# Patient Record
Sex: FEMALE | Race: WHITE | NOT HISPANIC OR LATINO | Employment: UNEMPLOYED | URBAN - METROPOLITAN AREA
[De-identification: names, ages, dates, MRNs, and addresses within clinical notes are randomized per-mention and may not be internally consistent; named-entity substitution may affect disease eponyms.]

---

## 2023-04-14 ENCOUNTER — HOSPITAL ENCOUNTER (OUTPATIENT)
Dept: DATA CONVERSION | Facility: HOSPITAL | Age: 83
End: 2023-04-14
Attending: PHYSICAL MEDICINE & REHABILITATION | Admitting: PHYSICAL MEDICINE & REHABILITATION
Payer: MEDICARE

## 2023-04-14 DIAGNOSIS — M47.816 SPONDYLOSIS WITHOUT MYELOPATHY OR RADICULOPATHY, LUMBAR REGION: ICD-10-CM

## 2023-04-14 DIAGNOSIS — M96.1 POSTLAMINECTOMY SYNDROME, NOT ELSEWHERE CLASSIFIED: ICD-10-CM

## 2023-04-28 ENCOUNTER — HOSPITAL ENCOUNTER (OUTPATIENT)
Dept: DATA CONVERSION | Facility: HOSPITAL | Age: 83
End: 2023-04-28
Attending: PHYSICAL MEDICINE & REHABILITATION | Admitting: PHYSICAL MEDICINE & REHABILITATION
Payer: MEDICARE

## 2023-04-28 DIAGNOSIS — M96.1 POSTLAMINECTOMY SYNDROME, NOT ELSEWHERE CLASSIFIED: ICD-10-CM

## 2023-04-28 DIAGNOSIS — M47.816 SPONDYLOSIS WITHOUT MYELOPATHY OR RADICULOPATHY, LUMBAR REGION: ICD-10-CM

## 2023-08-28 PROBLEM — R32 URINARY INCONTINENCE: Status: ACTIVE | Noted: 2023-08-28

## 2023-08-28 PROBLEM — M47.816 ARTHRITIS OF LUMBAR SPINE: Status: ACTIVE | Noted: 2023-08-28

## 2023-08-28 PROBLEM — M54.16 CHRONIC LUMBAR RADICULOPATHY: Status: ACTIVE | Noted: 2023-08-28

## 2023-08-28 PROBLEM — N28.1 RENAL CYST, LEFT: Status: ACTIVE | Noted: 2023-08-28

## 2023-08-28 PROBLEM — T81.9XXA COMPLICATION OF SURGICAL PROCEDURE: Status: ACTIVE | Noted: 2023-08-28

## 2023-08-28 RX ORDER — AMLODIPINE BESYLATE 10 MG/1
1 TABLET ORAL DAILY
COMMUNITY
Start: 2022-06-13

## 2023-08-28 RX ORDER — DULOXETIN HYDROCHLORIDE 30 MG/1
1 CAPSULE, DELAYED RELEASE ORAL 2 TIMES DAILY
COMMUNITY
Start: 2022-06-13 | End: 2024-01-18

## 2023-08-28 RX ORDER — HYDROCODONE BITARTRATE AND ACETAMINOPHEN 5; 325 MG/1; MG/1
1 TABLET ORAL EVERY 8 HOURS PRN
COMMUNITY
End: 2023-11-30 | Stop reason: ALTCHOICE

## 2023-08-28 RX ORDER — CHOLECALCIFEROL (VITAMIN D3) 50 MCG
1 TABLET ORAL DAILY
COMMUNITY
End: 2024-01-18 | Stop reason: ALTCHOICE

## 2023-08-28 RX ORDER — GABAPENTIN 300 MG/1
1 CAPSULE ORAL NIGHTLY
COMMUNITY
Start: 2023-02-13 | End: 2024-01-18 | Stop reason: ALTCHOICE

## 2023-08-28 RX ORDER — ACETAMINOPHEN 500 MG
TABLET ORAL
COMMUNITY

## 2023-08-28 RX ORDER — BIOTIN 1 MG
1 TABLET ORAL DAILY
COMMUNITY

## 2023-08-28 RX ORDER — NEBIVOLOL HYDROCHLORIDE 10 MG/1
1 TABLET ORAL DAILY
COMMUNITY

## 2023-08-28 RX ORDER — OMEPRAZOLE 40 MG/1
40 CAPSULE, DELAYED RELEASE ORAL
COMMUNITY

## 2023-08-28 RX ORDER — BUPRENORPHINE 5 UG/H
1 PATCH TRANSDERMAL
COMMUNITY
Start: 2023-02-22 | End: 2024-01-18 | Stop reason: ALTCHOICE

## 2023-08-28 RX ORDER — NALOXONE HYDROCHLORIDE 4 MG/.1ML
SPRAY NASAL
COMMUNITY
Start: 2023-01-20

## 2023-08-28 RX ORDER — NORTRIPTYLINE HYDROCHLORIDE 10 MG/1
1 CAPSULE ORAL DAILY
COMMUNITY

## 2023-08-28 RX ORDER — HYDROCODONE BITARTRATE AND ACETAMINOPHEN 10; 325 MG/1; MG/1
1 TABLET ORAL
COMMUNITY
Start: 2022-12-29 | End: 2023-11-30 | Stop reason: ALTCHOICE

## 2023-08-28 RX ORDER — LOPERAMIDE HCL 2 MG
1 TABLET ORAL EVERY 4 HOURS PRN
COMMUNITY

## 2023-08-28 RX ORDER — SPIRONOLACTONE 25 MG/1
1 TABLET ORAL DAILY
COMMUNITY

## 2023-08-28 RX ORDER — PREDNISONE 10 MG/1
10 TABLET ORAL
COMMUNITY
Start: 2022-08-16 | End: 2024-01-18 | Stop reason: ALTCHOICE

## 2023-08-28 RX ORDER — TRIAMCINOLONE ACETONIDE 1 MG/G
OINTMENT TOPICAL 3 TIMES DAILY
COMMUNITY
Start: 2021-02-23

## 2023-08-28 RX ORDER — ROSUVASTATIN CALCIUM 20 MG/1
40 TABLET, COATED ORAL DAILY
COMMUNITY
Start: 2022-06-13

## 2023-09-07 VITALS — HEIGHT: 62 IN | WEIGHT: 165.34 LBS | BODY MASS INDEX: 30.43 KG/M2

## 2023-09-14 LAB
MISCELLANEUOUS TEST RESULT: NORMAL
NAME OF SENDOUT TEST: NORMAL

## 2023-09-14 NOTE — H&P
History & Physical Reviewed:   I have reviewed the History and Physical dated:  23-Mar-2023   History and Physical reviewed and relevant findings noted. Patient examined to review pertinent physical  findings.: No significant changes   Home Medications Reviewed: no changes noted   Allergies Reviewed: no changes noted       ERAS (Enhanced Recovery After Surgery):  ·  ERAS Patient: no     Consent:   COVID-19 Consent:  ·  COVID-19 Risk Consent Surgeon has reviewed key risks related to the risk of mark anthony COVID-19 and if they contract COVID-19 what the risks are.       Electronic Signatures:  Esteban Rodriguez)  (Signed 14-Apr-2023 10:45)   Authored: History & Physical Reviewed, ERAS, Consent,  Note Completion      Last Updated: 14-Apr-2023 10:45 by Esteban Rodriguez)

## 2023-09-14 NOTE — H&P
History & Physical Reviewed:   I have reviewed the History and Physical dated:  18-Apr-2023   History and Physical reviewed and relevant findings noted. Patient examined to review pertinent physical  findings.: No significant changes   Home Medications Reviewed: no changes noted   Allergies Reviewed: no changes noted       ERAS (Enhanced Recovery After Surgery):  ·  ERAS Patient: no     Consent:   COVID-19 Consent:  ·  COVID-19 Risk Consent Surgeon has reviewed key risks related to the risk of mark anthony COVID-19 and if they contract COVID-19 what the risks are.     Attestation:   Note Completion:  I am a:  Resident/Fellow   Attending Attestation I saw and evaluated the patient.  I personally obtained the key and critical portions of the history and physical exam or was physically present for key and  critical portions performed by the resident/fellow. I reviewed the resident/fellow?s documentation and discussed the patient with the resident/fellow.  I agree with the resident/fellow?s medical decision making as documented in the note.     I personally evaluated the patient on 28-Apr-2023         Electronic Signatures:  Eva Meraz (DO (Resident))  (Signed 28-Apr-2023 11:19)   Authored: History & Physical Reviewed, ERAS, Consent,  Note Completion  Esteban Rodriguez)  (Signed 28-Apr-2023 11:36)   Authored: Note Completion   Co-Signer: History & Physical Reviewed, ERAS, Consent, Note Completion      Last Updated: 28-Apr-2023 11:36 by Esteban Rodriguez)

## 2023-10-02 NOTE — OP NOTE
Post Operative Note:     PreOp Diagnosis: Lumbar spondylosis   Post-Procedure Diagnosis: Lumbar spondylosis   Procedure: 1.  T12, L1, L2 diagnostic medial branch  block for L1-2 and L2-3 facet mediated pain bilaterally  2.   3.   4.   5.   Surgeon: Esteban Rodriguez MD   Resident/Fellow/Other Assistant: none   Estimated Blood Loss (mL): none   Specimen: no   Complications: none apparent   Findings: expected anatomy     Operative Report Dictated:  Dictation: not applicable - note contains Operative  Report   Operative Report:    Patient was identified in the preoperative area and informed consent was obtained. The patient was then brought to the operating room and placed in the prone position  With chlorhexidine and draped in the usual sterile fashion.  Using fluoroscopic guidance, 25-gauge spinal needles were then advanced to the appropriate target sites bilaterally and needle positions were confirmed in AP and lateral views.  Thereafter the  below medication was applied at each needle tip, the needles were then removed.  The patient was then transferred to the recovery room in stable condition.    Level(s): T12, L1, L2 For L1-2 and L2-3 facet arthropathy-  Laterality: [Bilateral]  Medication at each site [0.5mL] [0.5% bupivacaine]      Electronic Signatures:  Esteban Rodriguez)  (Signed 14-Apr-2023 11:12)   Authored: Post Operative Note, Note Completion      Last Updated: 14-Apr-2023 11:12 by Esteban Rodrgiuez)

## 2023-10-02 NOTE — OP NOTE
Post Operative Note:     PreOp Diagnosis: Lumbar spondylosis   Post-Procedure Diagnosis: Lumbar spondylosis at L1-2  and L2-3   Procedure: 1.  bilateral confirmatory diagnostic  medial branch blocks for L1-2 and L2-3 facet pain  2.   3.   4.   5.   Surgeon: Esteban Rodriguez MD   Resident/Fellow/Other Assistant: Konrad Barrow DO   Estimated Blood Loss (mL): none   Specimen: no   Complications: none apparent   Findings: expected anatomy     Operative Report Dictated:  Dictation: not applicable - note contains Operative  Report   Operative Report:    Patient was identified in the preoperative area and informed consent was obtained. The patient was then brought to the operating room and placed in the prone position  With chlorhexidine and draped in the usual sterile fashion.  Using fluoroscopic guidance, 25-gauge spinal needles were then advanced to the appropriate target sites bilaterally and needle positions were confirmed in AP and lateral views.  Thereafter the  below medication was applied at each needle tip, the needles were then removed.  The patient was then transferred to the recovery room in stable condition.    Level(s): T12, L1, L2 for L1-2 and L2-3 facet pain    Laterality: [Bilateral]  Medication at each site [0.5mL] [0.5% bupivacaine]    Attestation:   Note Completion:  Attending Attestation I was present for the entire procedure         Electronic Signatures:  Esteban Rodriguez)  (Signed 28-Apr-2023 12:09)   Authored: Post Operative Note, Note Completion      Last Updated: 28-Apr-2023 12:09 by Esteban Rodriguez)

## 2023-10-05 ENCOUNTER — APPOINTMENT (OUTPATIENT)
Dept: PAIN MEDICINE | Facility: HOSPITAL | Age: 83
End: 2023-10-05
Payer: MEDICARE

## 2023-11-30 ENCOUNTER — APPOINTMENT (OUTPATIENT)
Dept: PAIN MEDICINE | Facility: HOSPITAL | Age: 83
End: 2023-11-30
Payer: MEDICARE

## 2023-11-30 DIAGNOSIS — M96.1 POSTLAMINECTOMY SYNDROME OF LUMBAR REGION: ICD-10-CM

## 2023-11-30 DIAGNOSIS — M51.36 DDD (DEGENERATIVE DISC DISEASE), LUMBAR: Primary | ICD-10-CM

## 2023-11-30 NOTE — TELEPHONE ENCOUNTER
Pended 2 week RX for [place] 5-325 mg to [facility] due to pt having COVID to last until rescheduled appointment.    LV:  9/5/23  OLFD:  9/5/23  #90/30 DAYS    RX pended to [facility] for transmission to [facility]

## 2023-11-30 NOTE — TELEPHONE ENCOUNTER
Patient had a appointment scheduled for 11/30/23 but tested positive for Covid on 11/28/23.. patient will need a 2 week prescription sent to pharmacy.. patient was sleeping and will CB to schedule FU appointment

## 2023-12-01 RX ORDER — HYDROCODONE BITARTRATE AND ACETAMINOPHEN 5; 325 MG/1; MG/1
1 TABLET ORAL EVERY 8 HOURS PRN
Qty: 42 TABLET | Refills: 0 | Status: SHIPPED | OUTPATIENT
Start: 2023-12-01 | End: 2023-12-15

## 2023-12-01 NOTE — TELEPHONE ENCOUNTER
It is okay to fill a 2-week prescription for hydrocodone 5 mg up to 3 times per day for this patient who has COVID to last her until her next appointment with us.

## 2023-12-16 ENCOUNTER — LAB (OUTPATIENT)
Dept: LAB | Facility: LAB | Age: 83
End: 2023-12-16
Payer: MEDICARE

## 2023-12-16 ENCOUNTER — HOSPITAL ENCOUNTER (OUTPATIENT)
Dept: RADIOLOGY | Facility: HOSPITAL | Age: 83
Discharge: HOME | End: 2023-12-16
Payer: MEDICARE

## 2023-12-16 DIAGNOSIS — K59.09 OTHER CONSTIPATION: ICD-10-CM

## 2023-12-16 DIAGNOSIS — R10.30 LOWER ABDOMINAL PAIN, UNSPECIFIED: ICD-10-CM

## 2023-12-16 DIAGNOSIS — U09.9 POST COVID-19 CONDITION, UNSPECIFIED: Primary | ICD-10-CM

## 2023-12-16 LAB
ALBUMIN SERPL BCP-MCNC: 3.5 G/DL (ref 3.4–5)
ALP SERPL-CCNC: 75 U/L (ref 33–136)
ALT SERPL W P-5'-P-CCNC: 9 U/L (ref 7–45)
ANION GAP SERPL CALC-SCNC: 14 MMOL/L (ref 10–20)
AST SERPL W P-5'-P-CCNC: 11 U/L (ref 9–39)
BASOPHILS # BLD AUTO: 0.1 X10*3/UL (ref 0–0.1)
BASOPHILS NFR BLD AUTO: 0.9 %
BILIRUB SERPL-MCNC: 0.6 MG/DL (ref 0–1.2)
BUN SERPL-MCNC: 9 MG/DL (ref 6–23)
CALCIUM SERPL-MCNC: 9.3 MG/DL (ref 8.6–10.3)
CHLORIDE SERPL-SCNC: 96 MMOL/L (ref 98–107)
CO2 SERPL-SCNC: 28 MMOL/L (ref 21–32)
CREAT SERPL-MCNC: 0.63 MG/DL (ref 0.5–1.05)
EOSINOPHIL # BLD AUTO: 0.06 X10*3/UL (ref 0–0.4)
EOSINOPHIL NFR BLD AUTO: 0.5 %
ERYTHROCYTE [DISTWIDTH] IN BLOOD BY AUTOMATED COUNT: 12.4 % (ref 11.5–14.5)
GFR SERPL CREATININE-BSD FRML MDRD: 88 ML/MIN/1.73M*2
GLUCOSE SERPL-MCNC: 93 MG/DL (ref 74–99)
HCT VFR BLD AUTO: 37.1 % (ref 36–46)
HGB BLD-MCNC: 11.8 G/DL (ref 12–16)
IMM GRANULOCYTES # BLD AUTO: 0.04 X10*3/UL (ref 0–0.5)
IMM GRANULOCYTES NFR BLD AUTO: 0.3 % (ref 0–0.9)
LYMPHOCYTES # BLD AUTO: 1.53 X10*3/UL (ref 0.8–3)
LYMPHOCYTES NFR BLD AUTO: 13.2 %
MCH RBC QN AUTO: 28.6 PG (ref 26–34)
MCHC RBC AUTO-ENTMCNC: 31.8 G/DL (ref 32–36)
MCV RBC AUTO: 90 FL (ref 80–100)
MONOCYTES # BLD AUTO: 1.79 X10*3/UL (ref 0.05–0.8)
MONOCYTES NFR BLD AUTO: 15.5 %
NEUTROPHILS # BLD AUTO: 8.03 X10*3/UL (ref 1.6–5.5)
NEUTROPHILS NFR BLD AUTO: 69.6 %
NRBC BLD-RTO: 0 /100 WBCS (ref 0–0)
PLATELET # BLD AUTO: 409 X10*3/UL (ref 150–450)
POTASSIUM SERPL-SCNC: 4.5 MMOL/L (ref 3.5–5.3)
PROT SERPL-MCNC: 5.8 G/DL (ref 6.4–8.2)
RBC # BLD AUTO: 4.12 X10*6/UL (ref 4–5.2)
SODIUM SERPL-SCNC: 133 MMOL/L (ref 136–145)
WBC # BLD AUTO: 11.6 X10*3/UL (ref 4.4–11.3)

## 2023-12-16 PROCEDURE — 74019 RADEX ABDOMEN 2 VIEWS: CPT | Performed by: RADIOLOGY

## 2023-12-16 PROCEDURE — 36415 COLL VENOUS BLD VENIPUNCTURE: CPT

## 2023-12-16 PROCEDURE — 80053 COMPREHEN METABOLIC PANEL: CPT

## 2023-12-16 PROCEDURE — 74019 RADEX ABDOMEN 2 VIEWS: CPT

## 2023-12-16 PROCEDURE — 85025 COMPLETE CBC W/AUTO DIFF WBC: CPT

## 2024-01-18 ENCOUNTER — OFFICE VISIT (OUTPATIENT)
Dept: PAIN MEDICINE | Facility: HOSPITAL | Age: 84
End: 2024-01-18
Payer: MEDICARE

## 2024-01-18 VITALS
WEIGHT: 164.4 LBS | DIASTOLIC BLOOD PRESSURE: 76 MMHG | HEART RATE: 62 BPM | HEIGHT: 62 IN | SYSTOLIC BLOOD PRESSURE: 127 MMHG | BODY MASS INDEX: 30.25 KG/M2 | RESPIRATION RATE: 16 BRPM

## 2024-01-18 DIAGNOSIS — Z79.891 ENCOUNTER FOR LONG-TERM USE OF OPIATE ANALGESIC: ICD-10-CM

## 2024-01-18 DIAGNOSIS — M96.1 LUMBAR POSTLAMINECTOMY SYNDROME: Primary | ICD-10-CM

## 2024-01-18 PROCEDURE — 1159F MED LIST DOCD IN RCRD: CPT | Performed by: PHYSICAL MEDICINE & REHABILITATION

## 2024-01-18 PROCEDURE — 99214 OFFICE O/P EST MOD 30 MIN: CPT | Performed by: PHYSICAL MEDICINE & REHABILITATION

## 2024-01-18 RX ORDER — DULOXETIN HYDROCHLORIDE 30 MG/1
30 CAPSULE, DELAYED RELEASE ORAL DAILY
Qty: 30 CAPSULE | Refills: 2 | Status: SHIPPED | OUTPATIENT
Start: 2024-01-18

## 2024-01-18 ASSESSMENT — PATIENT HEALTH QUESTIONNAIRE - PHQ9
1. LITTLE INTEREST OR PLEASURE IN DOING THINGS: NOT AT ALL
SUM OF ALL RESPONSES TO PHQ9 QUESTIONS 1 AND 2: 0
2. FEELING DOWN, DEPRESSED OR HOPELESS: NOT AT ALL

## 2024-01-18 ASSESSMENT — COLUMBIA-SUICIDE SEVERITY RATING SCALE - C-SSRS
6. HAVE YOU EVER DONE ANYTHING, STARTED TO DO ANYTHING, OR PREPARED TO DO ANYTHING TO END YOUR LIFE?: NO
2. HAVE YOU ACTUALLY HAD ANY THOUGHTS OF KILLING YOURSELF?: NO
1. IN THE PAST MONTH, HAVE YOU WISHED YOU WERE DEAD OR WISHED YOU COULD GO TO SLEEP AND NOT WAKE UP?: NO

## 2024-01-18 ASSESSMENT — ENCOUNTER SYMPTOMS
DEPRESSION: 0
LOSS OF SENSATION IN FEET: 0
OCCASIONAL FEELINGS OF UNSTEADINESS: 1

## 2024-01-18 NOTE — PROGRESS NOTES
"Subjective   Patient ID: Janelle Jung is a 83 y.o. female who presents for Back Pain.  HPI    Location of Pain:pt is here for interval follow up and medication count. Patient states she is having low back pain and that moves up the back to the middle. Neck pain. Facial neuralgia has improved. Bilateral leg weakness/numbness. States she is having issues with bladder/bowel incontinence.     Pain Score: \"3\" /10 0-10 score     Treatment: Norco 5/325mg 2-3x's  Count:  pills left in rx bottle  Fill Date: 12/1/23  Last 3 Doses Taken: 1/18/24 1 dose and usually takes 1-2 per day  Efficacy: 30% relief   Side effects: constipation- Miralax     Narcan: exp- pt states she has at home- educated to bring to every visit     Other medications: Duloxetine     Injection/Procedures: SCS  [address]- helps with radicular pain but not lower back pain/ Bilateral Lumbar MBB - 0% relief with 2nd injection       Urine Screen: swab 9/5/23  Office agreement: 6/20/23  Narcotics Agreement: 9/5/23  ORT: 9/5/23 score 0  PDUQ: 9/5/23 score 8       Patient is presenting today history of postlaminectomy pain syndrome status post remote L3-S1 fusion with L3-L5 laminectomy, she also has a spinal cord stimulator and is maintained on low dose Narco taking no more than 1 to 2/day.  This is helping when she takes it as she does take it with Tylenol.  Also planning on meeting with the [facility] for reprogramming of her spinal cord stimulator.     The patient currently denies worsening numbness, tingling, or motor weakness.  She is having some more pain now in her back.  She may have been on duloxetine or Cymbalta at one point but she is not sure, review showed she may have been on it twice a day but again patient is not really sure and does not appear she is taking it.  She did not tolerate gabapentin  The patient denies fever, chills, night sweats, headache, weight loss, change in bowel/bladder function.     Interventions " tried:  -NSAID's  -Tylenol  -Gabapentin  -Lyrica  -Topamax  -TCA's  -PT:  -Aquatherapy  -Injections:                  OARRS:  Esteban Rodriguez MD on 1/26/2024 11:22 AM  I have personally reviewed the OARRS report for Janelle Jung. I have considered the risks of abuse, dependence, addiction and diversion and I believe that it is clinically appropriate for Janelle Jung to be prescribed this medication    Is the patient prescribed a combination of a benzodiazepine and opioid?  No    Last Urine Drug Screen / ordered today: No  No results found for this or any previous visit (from the past 8760 hour(s)).  Results are as expected.     Controlled Substance Agreement:  Date of the Last Agreement: 9/2/23  Reviewed Controlled Substance Agreement including but not limited to the benefits, risks, and alternatives to treatment with a Controlled Substance medication(s).    Monitoring and compliance:    ORT:    PDUQ:    Office Agreement:      Review of Systems   All other systems reviewed and are negative.       Current Outpatient Medications   Medication Instructions    acetaminophen (Tylenol) 500 mg tablet oral    amLODIPine (Norvasc) 10 mg tablet 1 tablet, oral, Daily    biotin 1 mg tablet 1 tablet, oral, Daily    Bystolic 10 mg tablet 1 tablet, oral, Daily    cholecalciferol (Vitamin D-3) 50 mcg (2,000 unit) capsule oral    DULoxetine (CYMBALTA) 30 mg, oral, Daily, Do not crush or chew.    HYDROcodone-acetaminophen (Norco) 5-325 mg tablet 1 tablet, oral, 2 times daily PRN    loperamide (Imodium A-D) 2 mg tablet 1 tablet, oral, Every 4 hours PRN    naloxone (Narcan) 4 mg/0.1 mL nasal spray nasal    nortriptyline (Pamelor) 10 mg capsule 1 capsule, oral, Daily    omeprazole (PRILOSEC) 40 mg, oral    rosuvastatin (Crestor) 20 mg tablet 1 tablet, oral, Daily    spironolactone (Aldactone) 25 mg tablet 1 tablet, oral, Daily    triamcinolone (Kenalog) 0.1 % ointment Topical, 3 times daily        Past Medical History:   Diagnosis  Date    Essential (primary) hypertension     Hypertension, well controlled        No past surgical history on file.     No family history on file.     Allergies   Allergen Reactions    Adhesive Tape-Silicones Unknown    Iodinated Contrast Media Unknown    Iodine Unknown    Latex Unknown    Metronidazole Unknown    Niacin Unknown    Sulfamethoxazole Unknown    Sulfites Unknown        Objective     Vitals:    01/18/24 1046   BP: 127/76   Pulse: 62   Resp: 16        Physical Exam    GENERAL EXAM  Vital Signs: Vital signs to include heart rate, respiration rate, blood pressure, and temperature were reviewed.  General Appearance:  Awake, alert, healthy appearing, well developed, No acute distress.  Head: Normocephalic without evidence of head injury.  Neck: The appearance of the neck was normal without swelling with a midline trachea.  Eyes: The eyelids and eyebrows exhibited no abnormalities.  Pupils were not pin-point.  Sclera was without icterus.  Lungs: Respiration rhythm and depth was normal.  Respiratory movements were normal without labored breathing.  Cardiovascular: No peripheral edema was present.    Neurological: Patient was oriented to time, place, and person.  Speech was normal.  Balance, gait, and stance were unremarkable.    Psychiatric: Appearance was normal with appropriate dress.  Mood was euthymic and affect was normal.  Skin: Affected regions were without ecchymosis or skin lesions.        Physical exam as above except:        Assessment/Plan   Problem List Items Addressed This Visit             ICD-10-CM    Lumbar postlaminectomy syndrome - Primary M96.1     83-year-old female with postlaminectomy syndrome on low-dose Norco, she did not tolerate gabapentin.  She does have a spinal cord stimulator in place which does help but I did encourage her to get it reprogrammed by the [facility] as it may be able to help her back some more.  It does help her leg pain significantly.  She may have been  on duloxetine at 1 point though I am not really able to confirm that and she is not sure if she continued it.  My nurse practitioners note last did say she was on it but again patient does not seem to be taking it now.  I think that we should restart that at 30 mg/day.  Otherwise I think we can continue Norco low-dose 1 to 2/day I will write for 60 total per month.  Urine drug screening will be due next visit.  OARRS reviewed no issues.  - Norco 5/325 1 to 2/day #60  - Cymbalta 30 mg daily, will increase that in the future.  - Continue with home exercise program.  - Follow-up with our [employer] rep for reprogramming.  - Follow-up with my nurse practitioner in 6 weeks.         Relevant Medications    DULoxetine (Cymbalta) 30 mg DR capsule    HYDROcodone-acetaminophen (Norco) 5-325 mg tablet    Encounter for long-term use of opiate analgesic Z79.891    Relevant Medications    DULoxetine (Cymbalta) 30 mg DR capsule    HYDROcodone-acetaminophen (Norco) 5-325 mg tablet            This note was generated with the aid of dictation software, there may be typos despite my attempts at proofreading.

## 2024-01-18 NOTE — ASSESSMENT & PLAN NOTE
83-year-old female with postlaminectomy syndrome on low-dose Norco, she did not tolerate gabapentin.  She does have a spinal cord stimulator in place which does help but I did encourage her to get it reprogrammed by the [employer] rep as it may be able to help her back some more.  It does help her leg pain significantly.  She may have been on duloxetine at 1 point though I am not really able to confirm that and she is not sure if she continued it.  My nurse practitioners note last did say she was on it but again patient does not seem to be taking it now.  I think that we should restart that at 30 mg/day.  Otherwise I think we can continue Norco low-dose 1 to 2/day I will write for 60 total per month.  Urine drug screening will be due next visit.  OARRS reviewed no issues.  - Norco 5/325 1 to 2/day #60  - Cymbalta 30 mg daily, will increase that in the future.  - Continue with home exercise program.  - Follow-up with our [employer] rep for reprogramming.  - Follow-up with my nurse practitioner in 6 weeks.

## 2024-01-25 DIAGNOSIS — Z79.891 ENCOUNTER FOR LONG-TERM USE OF OPIATE ANALGESIC: ICD-10-CM

## 2024-01-25 DIAGNOSIS — M96.1 LUMBAR POSTLAMINECTOMY SYNDROME: ICD-10-CM

## 2024-01-25 NOTE — TELEPHONE ENCOUNTER
Patient calling to advise still waiting on a refill of HYDROcodone-acetaminophen (Norco) 5-325 mg .. According to [facility]  will sent norco 60 pills

## 2024-01-26 RX ORDER — HYDROCODONE BITARTRATE AND ACETAMINOPHEN 5; 325 MG/1; MG/1
1 TABLET ORAL 2 TIMES DAILY PRN
Qty: 60 TABLET | Refills: 0 | OUTPATIENT
Start: 2024-01-26 | End: 2024-02-25

## 2024-01-26 RX ORDER — HYDROCODONE BITARTRATE AND ACETAMINOPHEN 5; 325 MG/1; MG/1
1 TABLET ORAL 2 TIMES DAILY PRN
Qty: 60 TABLET | Refills: 0 | Status: SHIPPED | OUTPATIENT
Start: 2024-01-26 | End: 2024-02-28 | Stop reason: SDUPTHER

## 2024-01-26 NOTE — TELEPHONE ENCOUNTER
Requested refill for Norco 5/325mg up to twice a day as needed #60 for 30 days. Per last ok this is current dose. OARRs verified LFD: 12/1/23. LV: 1/18/24. NV: 2/28/24. We did not send in at last visit. [facility].

## 2024-02-28 ENCOUNTER — HOSPITAL ENCOUNTER (OUTPATIENT)
Dept: RADIOLOGY | Facility: HOSPITAL | Age: 84
Discharge: HOME | End: 2024-02-28
Payer: MEDICARE

## 2024-02-28 ENCOUNTER — OFFICE VISIT (OUTPATIENT)
Dept: PAIN MEDICINE | Facility: HOSPITAL | Age: 84
End: 2024-02-28
Payer: MEDICARE

## 2024-02-28 VITALS
WEIGHT: 164.8 LBS | SYSTOLIC BLOOD PRESSURE: 130 MMHG | HEART RATE: 50 BPM | BODY MASS INDEX: 30.33 KG/M2 | HEIGHT: 62 IN | DIASTOLIC BLOOD PRESSURE: 57 MMHG | RESPIRATION RATE: 16 BRPM

## 2024-02-28 DIAGNOSIS — G89.29 CHRONIC BILATERAL THORACIC BACK PAIN: ICD-10-CM

## 2024-02-28 DIAGNOSIS — M54.6 CHRONIC BILATERAL THORACIC BACK PAIN: ICD-10-CM

## 2024-02-28 DIAGNOSIS — M96.1 LUMBAR POSTLAMINECTOMY SYNDROME: ICD-10-CM

## 2024-02-28 DIAGNOSIS — Z79.891 ENCOUNTER FOR LONG-TERM USE OF OPIATE ANALGESIC: ICD-10-CM

## 2024-02-28 DIAGNOSIS — M96.1 POSTLAMINECTOMY SYNDROME OF LUMBAR REGION: Primary | ICD-10-CM

## 2024-02-28 DIAGNOSIS — M79.18 MYOFASCIAL PAIN: ICD-10-CM

## 2024-02-28 PROCEDURE — 99214 OFFICE O/P EST MOD 30 MIN: CPT | Performed by: CLINICAL NURSE SPECIALIST

## 2024-02-28 PROCEDURE — 1159F MED LIST DOCD IN RCRD: CPT | Performed by: CLINICAL NURSE SPECIALIST

## 2024-02-28 PROCEDURE — 72072 X-RAY EXAM THORAC SPINE 3VWS: CPT

## 2024-02-28 PROCEDURE — 72072 X-RAY EXAM THORAC SPINE 3VWS: CPT | Performed by: RADIOLOGY

## 2024-02-28 PROCEDURE — 1036F TOBACCO NON-USER: CPT | Performed by: CLINICAL NURSE SPECIALIST

## 2024-02-28 PROCEDURE — 1160F RVW MEDS BY RX/DR IN RCRD: CPT | Performed by: CLINICAL NURSE SPECIALIST

## 2024-02-28 RX ORDER — HYDROCODONE BITARTRATE AND ACETAMINOPHEN 5; 325 MG/1; MG/1
1 TABLET ORAL 2 TIMES DAILY PRN
Qty: 60 TABLET | Refills: 0 | Status: SHIPPED | OUTPATIENT
Start: 2024-02-28 | End: 2024-03-04 | Stop reason: SDUPTHER

## 2024-02-28 ASSESSMENT — ENCOUNTER SYMPTOMS
LOSS OF SENSATION IN FEET: 0
OCCASIONAL FEELINGS OF UNSTEADINESS: 1
DEPRESSION: 1

## 2024-02-28 ASSESSMENT — PATIENT HEALTH QUESTIONNAIRE - PHQ9
10. IF YOU CHECKED OFF ANY PROBLEMS, HOW DIFFICULT HAVE THESE PROBLEMS MADE IT FOR YOU TO DO YOUR WORK, TAKE CARE OF THINGS AT HOME, OR GET ALONG WITH OTHER PEOPLE: NOT DIFFICULT AT ALL
2. FEELING DOWN, DEPRESSED OR HOPELESS: SEVERAL DAYS
1. LITTLE INTEREST OR PLEASURE IN DOING THINGS: NOT AT ALL
SUM OF ALL RESPONSES TO PHQ9 QUESTIONS 1 AND 2: 1

## 2024-02-28 ASSESSMENT — COLUMBIA-SUICIDE SEVERITY RATING SCALE - C-SSRS
2. HAVE YOU ACTUALLY HAD ANY THOUGHTS OF KILLING YOURSELF?: NO
6. HAVE YOU EVER DONE ANYTHING, STARTED TO DO ANYTHING, OR PREPARED TO DO ANYTHING TO END YOUR LIFE?: NO
1. IN THE PAST MONTH, HAVE YOU WISHED YOU WERE DEAD OR WISHED YOU COULD GO TO SLEEP AND NOT WAKE UP?: NO

## 2024-02-28 NOTE — ASSESSMENT & PLAN NOTE
83-year-old female with postlaminectomy syndrome presents for follow up.  Experiencing persistent lumbar radicular symptoms consistent with lumbar postlaminectomy syndrome as well as newer onset thoracic back pain.  Previously failed gabapentin. Maintained on a low dose of Norco and duloxetine.  She does have a spinal cord stimulator in place targeting lumbar radicular symptoms.  Patient met with the [employer] representative to reprogram stimulator at today's office visit with improvement in relief of lower extremity radicular symptoms.  Thoracic pain appears to have a MSK component affecting her posture, gait and balance.  Discussed that patient may benefit from a formal course of physical therapy to assess her posture, gait, balance and myofascial component of pain.  We will send patient for formal course of physical therapy.  We will also send the patient for a thoracic x-ray to rule out any acute pathology contributing to thoracic pain.  Patient will continue hydrocodone 5 mg 2 times per day as needed and duloxetine 30 mg daily.  Discussed increasing dose of duloxetine for additional relief of neuropathic/radicular symptoms.  She would like to hold off on increasing duloxetine at this time.  Plan reviewed with patient at today's office visit.    -Continue hydrocodone 5 mg up to 2 times per day as needed for pain.  Patient continues to use sparingly and endorses relief when she does use this medication.  -Continue duloxetine 30 mg daily.  Currently tolerating without adverse effects. May consider increasing this dose in the future.   -Patient sent for thoracic X ray.  -Sent for a formal course of PT targeting posture, gait/balance and myofascial component of pain.  -Further recommendations based on results of PT and imaging.   -Patient will follow up in 2 months or sooner if needed.   MEDICAL DECISION MAKING:    My impressions and treatment recommendations were discussed in detail with the patient, who  verbalized understanding and had no further questions prior to discharge. Given the patient's report of reduced pain and improved functional ability without adverse effects,  it is reasonable to continue hydrocodone 5mg up to 2 times per day as needed. The terms of the opioid agreement as well as the potential risks and adverse effects of the patient's medication regimen were discussed in detail. This includes if applicable due to dosage of medication permission to discuss and coordinate care with other treatment providers relevant to the patients condition. The patient verbalized understanding.    Treatment goals were discussed in detail with the patient.  These goals include reduction of pain levels, improved levels of functioning, avoidance of medication side effect and lowest medication dose possible to achieve  these goals.  The patient was in full agreement with these goals.  Also discussed is the understanding that pain may not be eliminated by medication but that the goal of a better sustaining life through use of medication is appropriate.  Lifestyle modifications including weight management, stretching, diet, exercise and smoking are addressed at each office visit.  The patient provided a urine drug screen for routine monitoring and compliance.  This test may be given as frequently as every month based on the patient's individual opiate risk stratification and prescriber concerns for any aberrancies.  This test is indicated given the use of controlled substances for the patient's medical condition.  Unless otherwise noted the prior (s) urine drug testing results were consistent with prescribed medications.  There is no evidence of illicit drug use or additional medications ingested.     Risks and side effects of chronic opioid therapy including but not limited to tolerance, dependence, constipation, hyperalgesia, cognitive side effects, addiction and possible death due to overuse and or misuse were  discussed. I also discussed that such medications when co-administered with other sedative agents including but not limited to alcohol, benzodiazepines, sedative hypnotics and illegal drugs could pose life threatening consequences including death.  I also explained the impact that the administration of such medication has on a patient with obstructive sleep apnea and continued recommendations for use of apnea devices if ordered are prescribed by other physicians.  In order to effectively and safely treat the pain, I also emphasized the importance of compliance with the treatment plan as well as compliance with the terms of the opioid agreement which was reviewed in detail. I explained the importance of being responsible with the medications and to take these only as prescribed, never in excess and never for reasons other than pain reduction. The patient was counseled on keeping the medications safe and locked away from children and other adults as well as disposal methods and options. The patient understood the risks and instructions.      I also discussed with the patient in detail that based on the clinical response to the opioid medications and improvements of activities of daily living, sleep, and work performance in light of compliance with the treatment plan we can continue this form of therapy for the above chronic  pain.  The goal and rationale used for current treatment with chronic opioid medication is to control the pain and alleviate disability induced by the chronic pain condition noted above after failures of other non-opioid and nonpharmacological modalities to treat the chronic pain and the symptoms associated with have failed.  The patient understood the goals in terms of the above treatment plan and had no further questions prior to leaving the office today.    Given the patient's total MED, general use of daily opiates, or other coadministered medications in various classes the patient was offered  a prescription for Narcan.  I instructed the patient that Narcan is for emergency use only and is worse suspected or known opiate overdose.  Call 911 prior to administration of this medication to activate the emergency response team.  It is imperative to fill this medication in order to demonstrate understanding of the gravity of possible side effects including respiratory depression and risk of overdose of this opiate load or medication combination.  As such patients will be required to bring Narcan prescriptions to follow-up appointments as part of the compliance with continued opiate care.    Disclaimer: This note was transcribed using an audio transcription device.  As such, minor errors may be present with regard to spelling, punctuation, and inadvertent word insertion.  Please disregard such errors.

## 2024-02-28 NOTE — PROGRESS NOTES
Subjective   Patient ID: Janelle Jung is a 83 y.o. female who presents for back pain  HPI  83-year-old female with history of chronic low back pain/lumbar postlaminectomy syndrome status post L3-S1 fusion with L3-5 laminectomy.  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  Previous diagnostic lumbar facet injection did not provide relief and she did not proceed with RFA.  Patient has a spinal cord stimulator with [employer] which has been providing relief of radicular symptoms, however, she feels it has lost its efficacy and her current program is not effective.  Patient states she lost the program that was most effective and would like to meet with the representative to reestablish her previous program which she felt was helpful.  [employer] representative present at today's visit to meet with the patient.  Patient has done formal physical therapy in the past and felt that it may have increased her pain.  Managing her pain with hydrocodone 5 mg 2 times daily as needed.  She restarted duloxetine 30 mg at her last office visit.  She has previously failed gabapentin.  She is unsure if she is taking nortriptyline.  Presents at today's office visit with chronic low back pain which radiates into bilateral lower extremities.  She states that she is experiencing mid back pain as well which is nonradiating.  She has chronic numbness and tingling to her lower extremities unchanged from previous exam.  She denies any increasing weakness or changes in bowel/bladder function.  She has noted some balance issues and feels that her posture is out of alignment favoring her right hip/lower extremity.  Continues to manage her pain with hydrocodone 5 mg taking 1-2 times per day when needed and duloxetine.  She is unsure if the duloxetine is helpful, however, she is not experiencing any adverse effects with this medication.  Spinal cord stimulator reprogrammed by [employer] representative  achieving excellent coverage.  Patient feels additional pain relief.      Location of Pain:pt is here for interval follow up and medication count. Patient states she is having low back pain and that moves up the back to the middle. Neck pain. Facial neuralgia has continued to improve but patient states that back pain has increased in the upper back and is now also radiating to the neck. Bilateral leg weakness/numbness with left worse than right. States she is having issues with bladder/bowel incontinence.     Pain Score: 6/10     Treatment: Norco 5/325mg BID  Count: 8  Fill Date: 01/26/2024  Last Dose Taken: 02/27/2024  Efficacy: 30% relief   Side effects: constipation- Miralax     Narcan: exp09/2024     Other medications: Duloxetine 30mg patient wants to stop, states that it hasn't helped and has tried it many times over several years, nortriptyline 10mg     Injection/Procedures: SCS  [employer]- helps with radicular pain but not lower back pain/ Bilateral Lumbar MBB - 0% relief with 2nd injection    OARRS:  No data recorded  I have personally reviewed the OARRS report for Janelle Jung. I have considered the risks of abuse, dependence, addiction and diversion    Is the patient prescribed a combination of a benzodiazepine and opioid?  No    Last Urine Drug Screen / ordered today: No 09/05/2023  No results found for this or any previous visit (from the past 8760 hour(s)).  Results are as expected.     Controlled Substance Agreement:  Date of the Last Agreement:  09/05/2023  Reviewed Controlled Substance Agreement including but not limited to the benefits, risks, and alternatives to treatment with a Controlled Substance medication(s).    Monitoring and compliance:    ORT: 09/05/2023    PDUQ: 09/05/2023    Office Agreement: 06/20/2023      Review of Systems    ROS:   General: No fevers, chills, weight loss  Skin: Negative for lesions  Eyes: No acute vision changes  Ears: No vertigo  Nose, mouth, throat: No  difficulty swallowing or speaking  Respiratory: No cough, shortness of breath, cyanosis  Cardiovascular: Negative for chest pain syncope or palpitation  Gastrointestinal: No constipation, nausea, vomiting  Neurological: Negative for headache, positive for: Paresthesia  Psychological: Negative for severe or debilitating anxiety, depression. Negative memory loss  Musculoskeletal: Positive for arthralgia, myalgia and pain  Endocrine: Negative for weight gain, appetite changes, excessive sweating  Allergy/immune: Negative    All 13 systems were reviewed and are within normal levels except as noted or in the history of present illness.  Positive or pertinent negative responses are noted or were in the history of present illness. As noted, the patient denies significant or impairing weakness in the bilateral upper and lower extremities, medication induced constipation, and bowel or bladder incontinence.     Current Outpatient Medications:     acetaminophen (Tylenol) 500 mg tablet, Take by mouth., Disp: , Rfl:     amLODIPine (Norvasc) 10 mg tablet, Take 1 tablet (10 mg) by mouth once daily., Disp: , Rfl:     biotin 1 mg tablet, Take 1 tablet (1 mg) by mouth once daily., Disp: , Rfl:     Bystolic 10 mg tablet, Take 1 tablet (10 mg) by mouth once daily., Disp: , Rfl:     cholecalciferol (Vitamin D-3) 50 mcg (2,000 unit) capsule, Take by mouth., Disp: , Rfl:     DULoxetine (Cymbalta) 30 mg DR capsule, Take 1 capsule (30 mg) by mouth once daily. Do not crush or chew., Disp: 30 capsule, Rfl: 2    loperamide (Imodium A-D) 2 mg tablet, Take 1 tablet (2 mg) by mouth every 4 hours if needed., Disp: , Rfl:     naloxone (Narcan) 4 mg/0.1 mL nasal spray, Administer into affected nostril(s)., Disp: , Rfl:     nortriptyline (Pamelor) 10 mg capsule, Take 1 capsule (10 mg) by mouth once daily., Disp: , Rfl:     omeprazole (PriLOSEC) 40 mg DR capsule, Take 1 capsule (40 mg) by mouth., Disp: , Rfl:     rosuvastatin (Crestor) 20 mg tablet,  Take 1 tablet (20 mg) by mouth once daily., Disp: , Rfl:     spironolactone (Aldactone) 25 mg tablet, Take 1 tablet (25 mg) by mouth once daily., Disp: , Rfl:     triamcinolone (Kenalog) 0.1 % ointment, Apply topically 3 times a day., Disp: , Rfl:      Past Medical History:   Diagnosis Date    Essential (primary) hypertension     Hypertension, well controlled        No past surgical history on file.     No family history on file.     Allergies   Allergen Reactions    Adhesive Tape-Silicones Unknown    Iodinated Contrast Media Unknown    Iodine Unknown    Latex Unknown    Metronidazole Unknown    Niacin Unknown    Sulfamethoxazole Unknown    Sulfites Unknown        Objective     Visit Vitals  Smoking Status Former        Physical Exam    PE:  General: Well-developed, well-nourished, no acute distress. The patient demonstrates no pain behavior, symptom magnification or overt drug-seeking behavior.  Eye: Pupils appropriate for room lighting  Neck/thyroid: No obvious goiter or enlargement of neck noted  Respiratory exam: Normal respiratory effort, unlabored respiration. No accessory muscle use noted  Cardiac exam: Bilateral radial pulses intact  Abdominal: Nondistended  Spine, thoracic: Tenderness to paraspinous musculature mid thoracic region.  No tenderness over thoracic spine.  Spine, lumbar: The patient is able to rise from a seated to standing position without hesitancy, push off, or delay. Gait is antalgic favoring right lower extremity.  Ambulates with a cane.  Chronic elevation of right hip with external rotation of right foot with ambulation.  Tenderness to paraspinous musculature is noted lower lumbar spine.  Flexion and extension limited.  Neurologic exam: Muscle strength is antigravity in all 4 extremities.  Equal muscle strength bilateral lower extremities 5/5.  Psychiatric exam: Judgment and insight normal, affect normal, speech is fluent, affect appropriate, demonstrating no signs of hypersomnolence,  sedation, or confusion          Assessment/Plan   Problem List Items Addressed This Visit             ICD-10-CM    Lumbar postlaminectomy syndrome M96.1     83-year-old female with postlaminectomy syndrome presents for follow up.  Experiencing persistent lumbar radicular symptoms consistent with lumbar postlaminectomy syndrome as well as newer onset thoracic back pain.  Previously failed gabapentin. Maintained on a low dose of Norco and duloxetine.  She does have a spinal cord stimulator in place targeting lumbar radicular symptoms.  Patient met with the [employer] representative to reprogram stimulator at today's office visit with improvement in relief of lower extremity radicular symptoms.  Thoracic pain appears to have a MSK component affecting her posture, gait and balance.  Discussed that patient may benefit from a formal course of physical therapy to assess her posture, gait, balance and myofascial component of pain.  We will send patient for formal course of physical therapy.  We will also send the patient for a thoracic x-ray to rule out any acute pathology contributing to thoracic pain.  Patient will continue hydrocodone 5 mg 2 times per day as needed and duloxetine 30 mg daily.  Discussed increasing dose of duloxetine for additional relief of neuropathic/radicular symptoms.  She would like to hold off on increasing duloxetine at this time.  Plan reviewed with patient at today's office visit.    -Continue hydrocodone 5 mg up to 2 times per day as needed for pain.  Patient continues to use sparingly and endorses relief when she does use this medication.  -Continue duloxetine 30 mg daily.  Currently tolerating without adverse effects. May consider increasing this dose in the future.   -Patient sent for thoracic X ray.  -Sent for a formal course of PT targeting posture, gait/balance and myofascial component of pain.  -Further recommendations based on results of PT and imaging.   -Patient will follow up  in 2 months or sooner if needed.   MEDICAL DECISION MAKING:    My impressions and treatment recommendations were discussed in detail with the patient, who verbalized understanding and had no further questions prior to discharge. Given the patient's report of reduced pain and improved functional ability without adverse effects,  it is reasonable to continue hydrocodone 5mg up to 2 times per day as needed. The terms of the opioid agreement as well as the potential risks and adverse effects of the patient's medication regimen were discussed in detail. This includes if applicable due to dosage of medication permission to discuss and coordinate care with other treatment providers relevant to the patients condition. The patient verbalized understanding.    Treatment goals were discussed in detail with the patient.  These goals include reduction of pain levels, improved levels of functioning, avoidance of medication side effect and lowest medication dose possible to achieve  these goals.  The patient was in full agreement with these goals.  Also discussed is the understanding that pain may not be eliminated by medication but that the goal of a better sustaining life through use of medication is appropriate.  Lifestyle modifications including weight management, stretching, diet, exercise and smoking are addressed at each office visit.  The patient provided a urine drug screen for routine monitoring and compliance.  This test may be given as frequently as every month based on the patient's individual opiate risk stratification and prescriber concerns for any aberrancies.  This test is indicated given the use of controlled substances for the patient's medical condition.  Unless otherwise noted the prior (s) urine drug testing results were consistent with prescribed medications.  There is no evidence of illicit drug use or additional medications ingested.     Risks and side effects of chronic opioid therapy including but not  limited to tolerance, dependence, constipation, hyperalgesia, cognitive side effects, addiction and possible death due to overuse and or misuse were discussed. I also discussed that such medications when co-administered with other sedative agents including but not limited to alcohol, benzodiazepines, sedative hypnotics and illegal drugs could pose life threatening consequences including death.  I also explained the impact that the administration of such medication has on a patient with obstructive sleep apnea and continued recommendations for use of apnea devices if ordered are prescribed by other physicians.  In order to effectively and safely treat the pain, I also emphasized the importance of compliance with the treatment plan as well as compliance with the terms of the opioid agreement which was reviewed in detail. I explained the importance of being responsible with the medications and to take these only as prescribed, never in excess and never for reasons other than pain reduction. The patient was counseled on keeping the medications safe and locked away from children and other adults as well as disposal methods and options. The patient understood the risks and instructions.      I also discussed with the patient in detail that based on the clinical response to the opioid medications and improvements of activities of daily living, sleep, and work performance in light of compliance with the treatment plan we can continue this form of therapy for the above chronic  pain.  The goal and rationale used for current treatment with chronic opioid medication is to control the pain and alleviate disability induced by the chronic pain condition noted above after failures of other non-opioid and nonpharmacological modalities to treat the chronic pain and the symptoms associated with have failed.  The patient understood the goals in terms of the above treatment plan and had no further questions prior to leaving the office  today.    Given the patient's total MED, general use of daily opiates, or other coadministered medications in various classes the patient was offered a prescription for Narcan.  I instructed the patient that Narcan is for emergency use only and is worse suspected or known opiate overdose.  Call 911 prior to administration of this medication to activate the emergency response team.  It is imperative to fill this medication in order to demonstrate understanding of the gravity of possible side effects including respiratory depression and risk of overdose of this opiate load or medication combination.  As such patients will be required to bring Narcan prescriptions to follow-up appointments as part of the compliance with continued opiate care.    Disclaimer: This note was transcribed using an audio transcription device.  As such, minor errors may be present with regard to spelling, punctuation, and inadvertent word insertion.  Please disregard such errors.             Relevant Medications    HYDROcodone-acetaminophen (Norco) 5-325 mg tablet    Encounter for long-term use of opiate analgesic Z79.891     Other Visit Diagnoses         Codes    Postlaminectomy syndrome of lumbar region    -  Primary M96.1    Relevant Orders    Referral to Physical Therapy    Myofascial pain     M79.18    Relevant Orders    Referral to Physical Therapy    Chronic bilateral thoracic back pain     M54.6, G89.29    Relevant Orders    XR thoracic spine 3 views

## 2024-03-04 DIAGNOSIS — M96.1 LUMBAR POSTLAMINECTOMY SYNDROME: ICD-10-CM

## 2024-03-04 RX ORDER — HYDROCODONE BITARTRATE AND ACETAMINOPHEN 5; 325 MG/1; MG/1
1 TABLET ORAL 2 TIMES DAILY PRN
Qty: 60 TABLET | Refills: 0 | Status: SHIPPED | OUTPATIENT
Start: 2024-03-04 | End: 2024-04-03

## 2024-03-04 NOTE — TELEPHONE ENCOUNTER
Pt's Norco 5-325 mg 1 tablet BID RX was inadvertently sent to mail order pharmacy, [facility].  Called [facility] and spoke with pharmacist, Mell, who cx the RX there.  RX needs transmitted to [facility].  Re-pended RX to go to retail pharmacy.  Ca Boyer RN

## 2024-03-04 NOTE — TELEPHONE ENCOUNTER
Patient needs her Hydrocodone-Acetaminophen sent to [facility] in [place] it was sent to mail pharmacy.

## 2024-03-06 ENCOUNTER — APPOINTMENT (OUTPATIENT)
Dept: RADIOLOGY | Facility: CLINIC | Age: 84
End: 2024-03-06
Payer: MEDICARE

## 2024-03-26 ENCOUNTER — TELEPHONE (OUTPATIENT)
Dept: PAIN MEDICINE | Facility: HOSPITAL | Age: 84
End: 2024-03-26
Payer: MEDICARE

## 2024-03-26 NOTE — TELEPHONE ENCOUNTER
Patient calling and would like to know x-ray that was done 02/28/2024 for spine showed anything?? Patient advised is in a lot of pain

## 2024-03-26 NOTE — TELEPHONE ENCOUNTER
Per Radha Bullard CNP, no fracture noted on x-ray.  Called pt and relayed above to patient who verbalizes understanding.  Ca Boyer RN

## 2024-04-05 ENCOUNTER — HOSPITAL ENCOUNTER (OUTPATIENT)
Dept: RADIOLOGY | Facility: CLINIC | Age: 84
Discharge: HOME | End: 2024-04-05
Payer: MEDICARE

## 2024-04-05 DIAGNOSIS — Z78.0 ASYMPTOMATIC MENOPAUSAL STATE: ICD-10-CM

## 2024-04-05 PROCEDURE — 77080 DXA BONE DENSITY AXIAL: CPT

## 2024-04-05 PROCEDURE — 77081 DXA BONE DENSITY APPENDICULR: CPT | Performed by: RADIOLOGY

## 2024-04-18 ENCOUNTER — EVALUATION (OUTPATIENT)
Dept: PHYSICAL THERAPY | Facility: HOSPITAL | Age: 84
End: 2024-04-18
Payer: MEDICARE

## 2024-04-18 DIAGNOSIS — M79.18 MYOFASCIAL PAIN: ICD-10-CM

## 2024-04-18 DIAGNOSIS — M96.1 POSTLAMINECTOMY SYNDROME OF LUMBAR REGION: ICD-10-CM

## 2024-04-18 DIAGNOSIS — R29.3 POSTURE ABNORMALITY: Primary | ICD-10-CM

## 2024-04-18 PROCEDURE — 97110 THERAPEUTIC EXERCISES: CPT | Mod: GP | Performed by: PHYSICAL THERAPIST

## 2024-04-18 PROCEDURE — 97161 PT EVAL LOW COMPLEX 20 MIN: CPT | Mod: GP | Performed by: PHYSICAL THERAPIST

## 2024-04-18 ASSESSMENT — PAIN - FUNCTIONAL ASSESSMENT: PAIN_FUNCTIONAL_ASSESSMENT: 0-10

## 2024-04-18 ASSESSMENT — PATIENT HEALTH QUESTIONNAIRE - PHQ9
7. TROUBLE CONCENTRATING ON THINGS, SUCH AS READING THE NEWSPAPER OR WATCHING TELEVISION: NEARLY EVERY DAY
6. FEELING BAD ABOUT YOURSELF - OR THAT YOU ARE A FAILURE OR HAVE LET YOURSELF OR YOUR FAMILY DOWN: NOT AT ALL
1. LITTLE INTEREST OR PLEASURE IN DOING THINGS: NOT AT ALL
10. IF YOU CHECKED OFF ANY PROBLEMS, HOW DIFFICULT HAVE THESE PROBLEMS MADE IT FOR YOU TO DO YOUR WORK, TAKE CARE OF THINGS AT HOME, OR GET ALONG WITH OTHER PEOPLE: NOT DIFFICULT AT ALL
3. TROUBLE FALLING OR STAYING ASLEEP OR SLEEPING TOO MUCH: NOT AT ALL
5. POOR APPETITE OR OVEREATING: NOT AT ALL
SUM OF ALL RESPONSES TO PHQ QUESTIONS 1-9: 6
2. FEELING DOWN, DEPRESSED OR HOPELESS: NEARLY EVERY DAY
9. THOUGHTS THAT YOU WOULD BE BETTER OFF DEAD, OR OF HURTING YOURSELF: NOT AT ALL
SUM OF ALL RESPONSES TO PHQ9 QUESTIONS 1 AND 2: 3
4. FEELING TIRED OR HAVING LITTLE ENERGY: NOT AT ALL
8. MOVING OR SPEAKING SO SLOWLY THAT OTHER PEOPLE COULD HAVE NOTICED. OR THE OPPOSITE, BEING SO FIGETY OR RESTLESS THAT YOU HAVE BEEN MOVING AROUND A LOT MORE THAN USUAL: NOT AT ALL

## 2024-04-18 ASSESSMENT — ENCOUNTER SYMPTOMS
LOSS OF SENSATION IN FEET: 1
DEPRESSION: 1
OCCASIONAL FEELINGS OF UNSTEADINESS: 1

## 2024-04-18 ASSESSMENT — PAIN SCALES - GENERAL: PAINLEVEL_OUTOF10: 5 - MODERATE PAIN

## 2024-04-18 NOTE — PROGRESS NOTES
"Physical Therapy    Physical Therapy Evaluation    Patient Name: Janelle Jung  MRN: [id number]  Today's Date: 4/18/2024  Time Calculation  Start Time: 1500  Stop Time: 1550  Time Calculation (min): 50 min    PT Evaluation Time Entry  PT Evaluation (Low) Time Entry: 40  PT Therapeutic Procedures Time Entry  Therapeutic Exercise Time Entry: 10                   Assessment  PT Assessment Results: Decreased range of motion, Decreased strength, Pain  Rehab Prognosis: Good  Evaluation/Treatment Tolerance: Patient tolerated treatment well  Pt. Is a 82y/o female with history of scoliosis that she feels is worse. She presents with scoliotic posture as described below. Pt. Is with decreased flexibility, and decreased core strength. Pt. Would benefit from skilled PT to address the above deficits.     Plan  Treatment/Interventions: Cryotherapy, Hot pack, Education/ Instruction, Self care/ home management, Therapeutic exercises, Therapeutic activities  PT Plan: Skilled PT  Rehab Potential: Good  Plan of Care Agreement: Patient       Current Problem  1. Posture abnormality        2. Postlaminectomy syndrome of lumbar region  Referral to Physical Therapy    Follow Up In Physical Therapy      3. Myofascial pain  Referral to Physical Therapy    Follow Up In Physical Therapy          Subjective   Pt. States she was with original low back surgery ~4 years ago fusion and pt. With cont. Pain and had hardware removal, then pt. Began with LBP that has cont. And states she is \"crooked\", and is with scoliosis. Pt. States she has been with increased pain every day.   Pt. States she has come to a point she is not able to function at home and will look into selling home to downsize.   Pt.  is also with back pain and decreased ability to function.   Pt. Does state she is with bowel and bladder incontinence  She has spinal stimulator and this helps her leg pain.   She wears a brace at times.   Pt. Is doing things at home to keep active. "         General:  General  Reason for Referral: intitial eval  Referred By: Radha AGOSTO  Preferred Learning Style: verbal  Precautions:  Precautions  STEADI Fall Risk Score (The score of 4 or more indicates an increased risk of falling): 11  Medical Precautions:  (thyroid d/o ; osteoperosis; OA; HTN; scoliosis; hysterectomy; ROBERT TKA; gall bladder removed; bowel resection.)       Pain:  Pain Assessment: 0-10  Pain Score: 5 - Moderate pain  Pain Type: Chronic pain  Pain Location: Back  By evening time her pain is so bad 9/10 and she has to ly down.     Home Living:  wnl   Prior Function Per Pt/Caregiver Report:  Retired miniaturist - artist.      Objective   Posture:    Scoliotic posture: L LE shorter; L thoracic rotation. Positive: rib hump with Hernandez test.        Range of Motion:    AROM: lumbar:  Flex: 40  Ext: wfl  SB: L20; R15  Rot: L wfl; R limited by 30%    Thoracic AROM:  All limited by ~40%     Strength:    ROBERT LE strength: wnl    ROBERT UE strength: 4/5     Flexibility:    HS: L causes pain : 65;   R : 65     Palpation:  Tender mid thoracic and lumbar area.      Special Tests:  SLR: (+) ROBERT     Gait:. Pt. Amb. With S.C. R.         Other:  Visit 1: 4-18-24 Eval and demo'd self breathing with equal rib cage expansion with ROBERT UE feedback; lumbar rotation supine.     EXERCISES       Date       VISIT# # # # #    REPS REPS REPS REPS   Nustep              DF stretch              Seated and UE on ball flex and sb              tband       row       Pull down       ir       er                                                                                                                                            HEP                 Outcome Measures:   Oswestry 50    OP EDUCATION:  Outpatient Education  Individual(s) Educated: Patient  Education Provided: Home Exercise Program, POC  Risk and Benefits Discussed with Patient/Caregiver/Other: yes  Patient/Caregiver Demonstrated Understanding: yes  Plan of Care  Discussed and Agreed Upon: yes  Patient Response to Education: Patient/Caregiver Verbalized Understanding of Information    Goals:  Active       postlaminectomy syndrome lumbar; myalgia; posture abnormality       Pt. will c/o less than 4/10 LBP with ADL's.        Start:  04/18/24    Expected End:  07/18/24            Pt. Will be indep with progressive HEP to cont. Progress made in PT.         Start:  04/18/24    Expected End:  07/18/24            Pt. will increase lumbar and thoracic motion to wfl to increase mobility.        Start:  04/18/24    Expected End:  07/18/24

## 2024-04-24 ENCOUNTER — OFFICE VISIT (OUTPATIENT)
Dept: PAIN MEDICINE | Facility: HOSPITAL | Age: 84
End: 2024-04-24
Payer: MEDICARE

## 2024-04-24 VITALS
WEIGHT: 162 LBS | DIASTOLIC BLOOD PRESSURE: 62 MMHG | HEART RATE: 52 BPM | OXYGEN SATURATION: 94 % | SYSTOLIC BLOOD PRESSURE: 136 MMHG | BODY MASS INDEX: 29.81 KG/M2 | HEIGHT: 62 IN | RESPIRATION RATE: 16 BRPM

## 2024-04-24 DIAGNOSIS — M54.16 CHRONIC LUMBAR RADICULOPATHY: ICD-10-CM

## 2024-04-24 DIAGNOSIS — M47.816 ARTHRITIS OF LUMBAR SPINE: ICD-10-CM

## 2024-04-24 DIAGNOSIS — M96.1 POSTLAMINECTOMY SYNDROME OF LUMBAR REGION: ICD-10-CM

## 2024-04-24 DIAGNOSIS — M79.18 MYOFASCIAL PAIN: ICD-10-CM

## 2024-04-24 DIAGNOSIS — M96.1 LUMBAR POSTLAMINECTOMY SYNDROME: ICD-10-CM

## 2024-04-24 DIAGNOSIS — M62.838 MUSCLE SPASM: ICD-10-CM

## 2024-04-24 DIAGNOSIS — Z79.891 LONG TERM PRESCRIPTION OPIATE USE: Primary | ICD-10-CM

## 2024-04-24 PROCEDURE — 99214 OFFICE O/P EST MOD 30 MIN: CPT | Performed by: CLINICAL NURSE SPECIALIST

## 2024-04-24 PROCEDURE — 80307 DRUG TEST PRSMV CHEM ANLYZR: CPT | Mod: 91,PORLAB,MUE | Performed by: CLINICAL NURSE SPECIALIST

## 2024-04-24 PROCEDURE — 1159F MED LIST DOCD IN RCRD: CPT | Performed by: CLINICAL NURSE SPECIALIST

## 2024-04-24 PROCEDURE — 80348 DRUG SCREENING BUPRENORPHINE: CPT | Performed by: CLINICAL NURSE SPECIALIST

## 2024-04-24 PROCEDURE — 80307 DRUG TEST PRSMV CHEM ANLYZR: CPT | Mod: PORLAB | Performed by: CLINICAL NURSE SPECIALIST

## 2024-04-24 PROCEDURE — 80372 DRUG SCREENING TAPENTADOL: CPT | Mod: PORLAB | Performed by: CLINICAL NURSE SPECIALIST

## 2024-04-24 PROCEDURE — 1160F RVW MEDS BY RX/DR IN RCRD: CPT | Performed by: CLINICAL NURSE SPECIALIST

## 2024-04-24 PROCEDURE — 80346 BENZODIAZEPINES1-12: CPT | Mod: 91,PORLAB | Performed by: CLINICAL NURSE SPECIALIST

## 2024-04-24 PROCEDURE — 1036F TOBACCO NON-USER: CPT | Performed by: CLINICAL NURSE SPECIALIST

## 2024-04-24 RX ORDER — METHOCARBAMOL 500 MG/1
250 TABLET, FILM COATED ORAL 2 TIMES DAILY PRN
Qty: 30 TABLET | Refills: 2 | Status: SHIPPED | OUTPATIENT
Start: 2024-04-24 | End: 2024-05-24

## 2024-04-24 RX ORDER — HYDROCODONE BITARTRATE AND ACETAMINOPHEN 5; 325 MG/1; MG/1
1 TABLET ORAL 2 TIMES DAILY PRN
Qty: 60 TABLET | Refills: 0 | Status: SHIPPED | OUTPATIENT
Start: 2024-04-24 | End: 2024-05-24

## 2024-04-24 ASSESSMENT — PATIENT HEALTH QUESTIONNAIRE - PHQ9
2. FEELING DOWN, DEPRESSED OR HOPELESS: NOT AT ALL
SUM OF ALL RESPONSES TO PHQ9 QUESTIONS 1 AND 2: 0
1. LITTLE INTEREST OR PLEASURE IN DOING THINGS: NOT AT ALL

## 2024-04-24 ASSESSMENT — ENCOUNTER SYMPTOMS
OCCASIONAL FEELINGS OF UNSTEADINESS: 1
DEPRESSION: 0
LOSS OF SENSATION IN FEET: 0

## 2024-04-24 ASSESSMENT — COLUMBIA-SUICIDE SEVERITY RATING SCALE - C-SSRS
1. IN THE PAST MONTH, HAVE YOU WISHED YOU WERE DEAD OR WISHED YOU COULD GO TO SLEEP AND NOT WAKE UP?: NO
6. HAVE YOU EVER DONE ANYTHING, STARTED TO DO ANYTHING, OR PREPARED TO DO ANYTHING TO END YOUR LIFE?: NO
2. HAVE YOU ACTUALLY HAD ANY THOUGHTS OF KILLING YOURSELF?: NO

## 2024-04-24 NOTE — ASSESSMENT & PLAN NOTE
83-year-old female with postlaminectomy syndrome presents for follow up of increasing thoracic/myofascial pain.  Patient feels that spinal cord stimulator is currently providing significant relief for lumbar radicular symptoms.  Mid back to low back myofascial pain is most bothersome.  Reviewed recent thoracic x-ray consistent with multilevel spondylosis with no acute fractures.  Previously failed gabapentin. Maintained on a low dose of Norco and duloxetine.  She was sent for formal course of physical therapy which she was unable to begin until recently.  She has proceeded with her initial evaluation and is planning to begin physical therapy in 1 week.  It does appear that her thoracic pain has a MSK component affecting her posture, gait and balance.  Discussed with patient that it would be beneficial for her to proceed with physical therapy targeting mid to low back pain and myofascial pain.  Physical therapy to address her gait, balance and posture.  We will continue to manage her pain with a low-dose of hydrocodone 5 mg 2 times per day as needed and duloxetine 30 mg daily.  Discussed increasing dose of duloxetine for additional relief of neuropathic/radicular symptoms.  She would like to hold off on increasing duloxetine at this time.  Adding a muscle relaxant at a low dose to help with muscle tightness and spasm.  Adding Robaxin 250 mg up to 2 times per day as needed.  Reviewed potential side effects with patient.  Advised to call our office if she experiences any adverse effects.  Plan reviewed with patient at today's office visit.    -Continue hydrocodone 5 mg up to 2 times per day as needed for pain.  Patient continues to use sparingly and endorses relief when she does use this medication.  -Continue duloxetine 30 mg daily.  Currently tolerating without adverse effects. May consider increasing this dose in the future.   -Added a low-dose of Robaxin for muscle tightness/spasm.  Avoiding tizanidine secondary to  potential hypotensive effects.  -Advised patient to proceed with a formal course of PT targeting posture, gait/balance and myofascial component of pain.  -Further recommendations based on results of PT.   -Patient will follow up in 6 weeks after completing physical therapy.   MEDICAL DECISION MAKING:    My impressions and treatment recommendations were discussed in detail with the patient, who verbalized understanding and had no further questions prior to discharge. Given the patient's report of reduced pain and improved functional ability without adverse effects,  it is reasonable to continue hydrocodone 5mg up to 2 times per day as needed. The terms of the opioid agreement as well as the potential risks and adverse effects of the patient's medication regimen were discussed in detail. This includes if applicable due to dosage of medication permission to discuss and coordinate care with other treatment providers relevant to the patients condition. The patient verbalized understanding.    Treatment goals were discussed in detail with the patient.  These goals include reduction of pain levels, improved levels of functioning, avoidance of medication side effect and lowest medication dose possible to achieve  these goals.  The patient was in full agreement with these goals.  Also discussed is the understanding that pain may not be eliminated by medication but that the goal of a better sustaining life through use of medication is appropriate.  Lifestyle modifications including weight management, stretching, diet, exercise and smoking are addressed at each office visit.  The patient provided a urine drug screen for routine monitoring and compliance.  This test may be given as frequently as every month based on the patient's individual opiate risk stratification and prescriber concerns for any aberrancies.  This test is indicated given the use of controlled substances for the patient's medical condition.  Unless otherwise  noted the prior (s) urine drug testing results were consistent with prescribed medications.  There is no evidence of illicit drug use or additional medications ingested.     Risks and side effects of chronic opioid therapy including but not limited to tolerance, dependence, constipation, hyperalgesia, cognitive side effects, addiction and possible death due to overuse and or misuse were discussed. I also discussed that such medications when co-administered with other sedative agents including but not limited to alcohol, benzodiazepines, sedative hypnotics and illegal drugs could pose life threatening consequences including death.  I also explained the impact that the administration of such medication has on a patient with obstructive sleep apnea and continued recommendations for use of apnea devices if ordered are prescribed by other physicians.  In order to effectively and safely treat the pain, I also emphasized the importance of compliance with the treatment plan as well as compliance with the terms of the opioid agreement which was reviewed in detail. I explained the importance of being responsible with the medications and to take these only as prescribed, never in excess and never for reasons other than pain reduction. The patient was counseled on keeping the medications safe and locked away from children and other adults as well as disposal methods and options. The patient understood the risks and instructions.      I also discussed with the patient in detail that based on the clinical response to the opioid medications and improvements of activities of daily living, sleep, and work performance in light of compliance with the treatment plan we can continue this form of therapy for the above chronic  pain.  The goal and rationale used for current treatment with chronic opioid medication is to control the pain and alleviate disability induced by the chronic pain condition noted above after failures of other  non-opioid and nonpharmacological modalities to treat the chronic pain and the symptoms associated with have failed.  The patient understood the goals in terms of the above treatment plan and had no further questions prior to leaving the office today.    Given the patient's total MED, general use of daily opiates, or other coadministered medications in various classes the patient was offered a prescription for Narcan.  I instructed the patient that Narcan is for emergency use only and is worse suspected or known opiate overdose.  Call 911 prior to administration of this medication to activate the emergency response team.  It is imperative to fill this medication in order to demonstrate understanding of the gravity of possible side effects including respiratory depression and risk of overdose of this opiate load or medication combination.  As such patients will be required to bring Narcan prescriptions to follow-up appointments as part of the compliance with continued opiate care.    Disclaimer: This note was transcribed using an audio transcription device.  As such, minor errors may be present with regard to spelling, punctuation, and inadvertent word insertion.  Please disregard such errors.

## 2024-04-24 NOTE — PROGRESS NOTES
Subjective   Patient ID: Janelle Jung is a 83 y.o. female who presents for thoracic and lumbar pain      HPI    83-year-old female with history of chronic low back pain/lumbar postlaminectomy syndrome status post L3-S1 fusion with L3-5 laminectomy.  Previously had tried diagnostic lumbar facet injections which did not provide relief.  She has a spinal cord stimulator with [employer] which has been providing relief of radicular symptoms.  Recently reprogrammed with improvement in coverage for her lower legs.  Presented at her last office visit with an increase in mid back pain which was nonradiating.  Patient was sent for thoracic x-ray and continued on hydrocodone twice daily as well as duloxetine 30 mg daily.  She was also sent for formal course of physical therapy to assess gait/balance and myofascial component of pain.  Presents at today's office visit with pain from her mid back to her low back.  Pain is aching and throbbing.  Pain increases with most activity especially standing, bending and walking.  Pain is accompanied by spasms.  She has chronic numbness and tingling in her left lower extremity/left foot unchanged from previous exam.  She denies any lower extremity weakness.  She has chronic issues with constipation versus diarrhea as well as incontinence of urine.  She continues to have balance problems and feels that her posture is out of alignment.  Thoracic x-ray consistent with multilevel spondylosis with no acute fracture noted.  She was unable to start physical therapy.  She has proceeded with the physical therapy evaluation and is scheduled to start regular therapy sessions next week.  Continues to manage her pain with hydrocodone twice daily and duloxetine 30 mg daily.  She is not sure that these medications are helpful.  She states that she feels her pain just continues to get worse.  She does feel that her spinal cord stimulator is helpful for her lumbar radicular symptoms.      Location  of Pain:pt is here for interval follow up and medication count. Patient states she is having low back pain and that moves up the back to the middle back and neck.     Pain Score: 6/10     Treatment: Norco 5/325mg BID  Count: 1 pill left in rx bottle  Fill Date: 3/4/24  Last Dose Taken: 4/24/24 1 dose and takes 1-2 tabs a day  Efficacy: 20% relief   Side effects: constipation- Miralax     Narcan: exp 09/2024     Other medications: Nortriptyline 10mg     Injection/Procedures: SCS  [employer]- helps with radicular pain but not lower back pain/ Bilateral Lumbar MBB - 0% relief with 2nd injection    PT evaluation and insurance approved so will start PT    OARRS:  No data recorded  I have personally reviewed the OARRS report for Janelle Jung. I have considered the risks of abuse, dependence, addiction and diversion    Is the patient prescribed a combination of a benzodiazepine and opioid?  No    Last Urine Drug Screen / ordered today: Yes  No results found for this or any previous visit (from the past 8760 hour(s)).  Results are as expected.     Controlled Substance Agreement: 9/5/23  Date of the Last Agreement: 9/5/23  Reviewed Controlled Substance Agreement including but not limited to the benefits, risks, and alternatives to treatment with a Controlled Substance medication(s).    Monitoring and compliance: 4/24/24    ORT: 9/5/23    PDUQ: 4/24/24 score 6    Office Agreement: 6/20/23      Review of Systems    ROS:   General: No fevers, chills, weight loss  Skin: Negative for lesions  Eyes: No acute vision changes  Ears: No vertigo  Nose, mouth, throat: No difficulty swallowing or speaking  Respiratory: No cough, shortness of breath, cyanosis  Cardiovascular: Negative for chest pain syncope or palpitation  Gastrointestinal: No constipation, nausea, vomiting  Neurological: Negative for headache, positive for: Paresthesia left lower extremity  Psychological: Negative for severe or debilitating anxiety, depression.  Negative memory loss  Musculoskeletal: Positive for arthralgia, myalgia, pain and spasm  Endocrine: Negative for weight gain, appetite changes, excessive sweating  Allergy/immune: Negative    All 13 systems were reviewed and are within normal levels except as noted or in the history of present illness.  Positive or pertinent negative responses are noted or were in the history of present illness. As noted, the patient denies significant or impairing weakness in the bilateral upper and lower extremities, medication induced constipation, and bowel or bladder incontinence.     Current Outpatient Medications:     acetaminophen (Tylenol) 500 mg tablet, Take by mouth., Disp: , Rfl:     amLODIPine (Norvasc) 10 mg tablet, Take 1 tablet (10 mg) by mouth once daily., Disp: , Rfl:     biotin 1 mg tablet, Take 1 tablet (1 mg) by mouth once daily., Disp: , Rfl:     Bystolic 10 mg tablet, Take 1 tablet (10 mg) by mouth once daily., Disp: , Rfl:     cholecalciferol (Vitamin D-3) 50 mcg (2,000 unit) capsule, Take by mouth., Disp: , Rfl:     DULoxetine (Cymbalta) 30 mg DR capsule, Take 1 capsule (30 mg) by mouth once daily. Do not crush or chew., Disp: 30 capsule, Rfl: 2    loperamide (Imodium A-D) 2 mg tablet, Take 1 tablet (2 mg) by mouth every 4 hours if needed., Disp: , Rfl:     naloxone (Narcan) 4 mg/0.1 mL nasal spray, Administer into affected nostril(s)., Disp: , Rfl:     nortriptyline (Pamelor) 10 mg capsule, Take 1 capsule (10 mg) by mouth once daily., Disp: , Rfl:     omeprazole (PriLOSEC) 40 mg DR capsule, Take 1 capsule (40 mg) by mouth., Disp: , Rfl:     rosuvastatin (Crestor) 20 mg tablet, Take 1 tablet (20 mg) by mouth once daily., Disp: , Rfl:     spironolactone (Aldactone) 25 mg tablet, Take 1 tablet (25 mg) by mouth once daily., Disp: , Rfl:     triamcinolone (Kenalog) 0.1 % ointment, Apply topically 3 times a day., Disp: , Rfl:      Past Medical History:   Diagnosis Date    Essential (primary) hypertension      Hypertension, well controlled        No past surgical history on file.     No family history on file.     Allergies   Allergen Reactions    Adhesive Tape-Silicones Unknown    Iodinated Contrast Media Unknown    Iodine Unknown    Latex Unknown    Metronidazole Unknown    Niacin Unknown    Sulfamethoxazole Unknown    Sulfites Unknown        Objective     Visit Vitals  Smoking Status Former        Physical Exam    PE:  General: Well-developed, well-nourished, no acute distress. The patient demonstrates no pain behavior, symptom magnification or overt drug-seeking behavior.  Eye: Pupils appropriate for room lighting  Neck/thyroid: No obvious goiter or enlargement of neck noted  Respiratory exam: Normal respiratory effort, unlabored respiration. No accessory muscle use noted  Cardiac exam: Bilateral radial pulses intact  Abdominal: Nondistended  Spine, thoracic: Minimal tenderness thoracic spine.  No gross step-offs noted.  Tenderness to paraspinous musculature mid thoracic to lumbar spine.  Multiple myofascial tender points and muscle tightness thoracic and lumbar region.  Spine, lumbar: The patient is able to rise from a seated to standing position without hesitancy, push off, or delay. Gait is antalgic favoring her right lower extremity.  Ambulates with a cane.  Chronic elevation of her right hip and external rotation of her right foot with ambulation.  Flexion and extension limited secondary to pain.  Neurologic exam: Muscle strength is antigravity in all 4 extremities.  Equal muscle strength bilateral lower extremities 5/5.  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  Patient feels that spinal cord stimulator is currently providing significant relief for lumbar radicular symptoms.  Mid back to low back myofascial pain is most bothersome.  Reviewed recent thoracic x-ray consistent with multilevel spondylosis with no acute fractures.  Previously failed gabapentin. Maintained on a low dose of Norco and duloxetine.  She was sent for formal course of physical therapy which she was unable to begin until recently.  She has proceeded with her initial evaluation and is planning to begin physical therapy in 1 week.  It does appear that her thoracic pain has a MSK component affecting her posture, gait and balance.  Discussed with patient that it would be beneficial for her to proceed with physical therapy targeting mid to low back pain and myofascial pain.  Physical therapy to address her gait, balance and posture.  We will continue to manage her pain with a low-dose of hydrocodone 5 mg 2 times per day as needed and duloxetine 30 mg daily.  Discussed increasing dose of duloxetine for additional relief of neuropathic/radicular symptoms.  She would like to hold off on increasing duloxetine at this time.  Adding a muscle relaxant at a low dose to help with muscle tightness and spasm.  Adding Robaxin 250 mg up to 2 times per day as needed.  Reviewed potential side effects with patient.  Advised to call our office if she experiences any adverse effects.  Plan reviewed with patient at today's office visit.    -Continue hydrocodone 5 mg up to 2 times per day as needed for pain.  Patient continues to use sparingly and endorses relief when she does use this medication.  -Continue duloxetine 30 mg daily.  Currently tolerating without adverse effects. May consider increasing this dose in the future.   -Added a low-dose of Robaxin for muscle tightness/spasm.  Avoiding tizanidine secondary to potential hypotensive effects.  -Advised patient to proceed with a formal course of  PT targeting posture, gait/balance and myofascial component of pain.  -Further recommendations based on results of PT.   -Patient will follow up in 6 weeks after completing physical therapy.   MEDICAL DECISION MAKING:    My impressions and treatment recommendations were discussed in detail with the patient, who verbalized understanding and had no further questions prior to discharge. Given the patient's report of reduced pain and improved functional ability without adverse effects,  it is reasonable to continue hydrocodone 5mg up to 2 times per day as needed. The terms of the opioid agreement as well as the potential risks and adverse effects of the patient's medication regimen were discussed in detail. This includes if applicable due to dosage of medication permission to discuss and coordinate care with other treatment providers relevant to the patients condition. The patient verbalized understanding.    Treatment goals were discussed in detail with the patient.  These goals include reduction of pain levels, improved levels of functioning, avoidance of medication side effect and lowest medication dose possible to achieve  these goals.  The patient was in full agreement with these goals.  Also discussed is the understanding that pain may not be eliminated by medication but that the goal of a better sustaining life through use of medication is appropriate.  Lifestyle modifications including weight management, stretching, diet, exercise and smoking are addressed at each office visit.  The patient provided a urine drug screen for routine monitoring and compliance.  This test may be given as frequently as every month based on the patient's individual opiate risk stratification and prescriber concerns for any aberrancies.  This test is indicated given the use of controlled substances for the patient's medical condition.  Unless otherwise noted the prior (s) urine drug testing results were consistent with prescribed  medications.  There is no evidence of illicit drug use or additional medications ingested.     Risks and side effects of chronic opioid therapy including but not limited to tolerance, dependence, constipation, hyperalgesia, cognitive side effects, addiction and possible death due to overuse and or misuse were discussed. I also discussed that such medications when co-administered with other sedative agents including but not limited to alcohol, benzodiazepines, sedative hypnotics and illegal drugs could pose life threatening consequences including death.  I also explained the impact that the administration of such medication has on a patient with obstructive sleep apnea and continued recommendations for use of apnea devices if ordered are prescribed by other physicians.  In order to effectively and safely treat the pain, I also emphasized the importance of compliance with the treatment plan as well as compliance with the terms of the opioid agreement which was reviewed in detail. I explained the importance of being responsible with the medications and to take these only as prescribed, never in excess and never for reasons other than pain reduction. The patient was counseled on keeping the medications safe and locked away from children and other adults as well as disposal methods and options. The patient understood the risks and instructions.      I also discussed with the patient in detail that based on the clinical response to the opioid medications and improvements of activities of daily living, sleep, and work performance in light of compliance with the treatment plan we can continue this form of therapy for the above chronic  pain.  The goal and rationale used for current treatment with chronic opioid medication is to control the pain and alleviate disability induced by the chronic pain condition noted above after failures of other non-opioid and nonpharmacological modalities to treat the chronic pain and the  symptoms associated with have failed.  The patient understood the goals in terms of the above treatment plan and had no further questions prior to leaving the office today.    Given the patient's total MED, general use of daily opiates, or other coadministered medications in various classes the patient was offered a prescription for Narcan.  I instructed the patient that Narcan is for emergency use only and is worse suspected or known opiate overdose.  Call 911 prior to administration of this medication to activate the emergency response team.  It is imperative to fill this medication in order to demonstrate understanding of the gravity of possible side effects including respiratory depression and risk of overdose of this opiate load or medication combination.  As such patients will be required to bring Narcan prescriptions to follow-up appointments as part of the compliance with continued opiate care.    Disclaimer: This note was transcribed using an audio transcription device.  As such, minor errors may be present with regard to spelling, punctuation, and inadvertent word insertion.  Please disregard such errors.           Chronic lumbar radiculopathy M54.16    Relevant Medications    HYDROcodone-acetaminophen (Norco) 5-325 mg tablet    Lumbar postlaminectomy syndrome M96.1    Postlaminectomy syndrome of lumbar region M96.1    Relevant Medications    HYDROcodone-acetaminophen (Norco) 5-325 mg tablet    Myofascial pain M79.18     Other Visit Diagnoses         Codes    Long term prescription opiate use    -  Primary Z79.891    Relevant Orders    Buprenorphine Confirm,Urine    Tapentadol Confirmation, Urine    Alcohol, Urine    Opiate/Opioid/Benzo Prescription Compliance    OOB Internal Tracking    Muscle spasm     M62.838    Relevant Medications    methocarbamol (Robaxin) 500 mg tablet

## 2024-04-25 LAB
AMPHETAMINES UR QL SCN: NORMAL
BARBITURATES UR QL SCN: NORMAL
BZE UR QL SCN: NORMAL
CANNABINOIDS UR QL SCN: NORMAL
CREAT UR-MCNC: 242.8 MG/DL (ref 20–320)
ETHANOL ?TM UR-MCNC: <10 MG/DL
PCP UR QL SCN: NORMAL

## 2024-04-28 LAB
BUPRENORPHINE UR-MCNC: <2 NG/ML
BUPRENORPHINE UR-MCNC: <5 NG/ML
NALOXONE UR CFM-MCNC: <100 NG/ML
NORBUPRENORPHINE UR CFM-MCNC: <5 NG/ML
NORBUPRENORPHINE UR-MCNC: <2 NG/ML

## 2024-04-29 ENCOUNTER — TREATMENT (OUTPATIENT)
Dept: PHYSICAL THERAPY | Facility: HOSPITAL | Age: 84
End: 2024-04-29
Payer: MEDICARE

## 2024-04-29 DIAGNOSIS — M96.1 POSTLAMINECTOMY SYNDROME OF LUMBAR REGION: ICD-10-CM

## 2024-04-29 DIAGNOSIS — M79.18 MYOFASCIAL PAIN: ICD-10-CM

## 2024-04-29 PROCEDURE — 97110 THERAPEUTIC EXERCISES: CPT | Mod: GP,CQ | Performed by: PHYSICAL THERAPY ASSISTANT

## 2024-04-29 ASSESSMENT — PAIN - FUNCTIONAL ASSESSMENT: PAIN_FUNCTIONAL_ASSESSMENT: 0-10

## 2024-04-29 ASSESSMENT — PAIN SCALES - GENERAL: PAINLEVEL_OUTOF10: 5 - MODERATE PAIN

## 2024-04-29 NOTE — PROGRESS NOTES
"Physical Therapy    Physical Therapy Treatment    Patient Name: Janelle Jung  MRN: [id number]  Today's Date: 4/29/2024  Time Calculation  Start Time: 1438  Stop Time: 1523  Time Calculation (min): 45 min    PT Therapeutic Procedures Time Entry  Therapeutic Exercise Time Entry: 45        VISIT# 2    Current Problem  1. Postlaminectomy syndrome of lumbar region  Follow Up In Physical Therapy      2. Myofascial pain  Follow Up In Physical Therapy          Subjective      Pt reports back pain persists. \"I need to do therapy to get stronger and get an MRI\" Reports she can only stand 15 minutes before pain increases.  Precautions  Precautions  STEADI Fall Risk Score (The score of 4 or more indicates an increased risk of falling): 11  Medical Precautions:  (thyroid d/o ; osteoperosis; OA; HTN; scoliosis; hysterectomy; ROBERT TKA; gall bladder removed; bowel resection.)       Pain  Pain Assessment: 0-10  Pain Score: 5 - Moderate pain  Pain Type: Chronic pain  Pain Location: Back       Objective     Initiated there ex per flow sheet.  Arrived without assistive device, left cane in car. Reports she uses walker or crutches mostly.   Treatments:     EXERCISES       Date 4/29/24      VISIT# #2 # # #    REPS REPS REPS REPS   Nustep L1 x 10'             DF stretch 30\" x 3             Seated and UE on ball flex and sb 5\"H x 10 each             tband       row Red 3 x 10      Pull down Red 3 x 10      ir Red 3 x 10      er Red 3 x 10                                                                                                                                           HEP               Assessment:   Pt tolerated initial session with no change in back pain. Reports fatigue and mid thoracic pain post session although pt states the longer she performs activity the pain goes up into thoracic area to lower cervical spine.     OP EDUCATION:       Plan:   Assess response to initial session and progress ROM/strengthening program to improve " mobility.     Goals:  Active       postlaminectomy syndrome lumbar; myalgia; posture abnormality       Pt. will c/o less than 4/10 LBP with ADL's.        Start:  04/18/24    Expected End:  07/18/24            Pt. Will be indep with progressive HEP to cont. Progress made in PT.         Start:  04/18/24    Expected End:  07/18/24            Pt. will increase lumbar and thoracic motion to wfl to increase mobility.        Start:  04/18/24    Expected End:  07/18/24

## 2024-05-01 ENCOUNTER — TREATMENT (OUTPATIENT)
Dept: PHYSICAL THERAPY | Facility: HOSPITAL | Age: 84
End: 2024-05-01
Payer: MEDICARE

## 2024-05-01 DIAGNOSIS — M79.18 MYOFASCIAL PAIN: ICD-10-CM

## 2024-05-01 DIAGNOSIS — M96.1 POSTLAMINECTOMY SYNDROME OF LUMBAR REGION: ICD-10-CM

## 2024-05-01 LAB
1OH-MIDAZOLAM UR CFM-MCNC: <25 NG/ML
6MAM UR CFM-MCNC: <25 NG/ML
7AMINOCLONAZEPAM UR CFM-MCNC: <25 NG/ML
A-OH ALPRAZ UR CFM-MCNC: <25 NG/ML
ALPRAZ UR CFM-MCNC: <25 NG/ML
CHLORDIAZEP UR CFM-MCNC: <25 NG/ML
CLONAZEPAM UR CFM-MCNC: <25 NG/ML
CODEINE UR CFM-MCNC: <50 NG/ML
DIAZEPAM UR CFM-MCNC: <25 NG/ML
EDDP UR CFM-MCNC: <25 NG/ML
FENTANYL UR CFM-MCNC: <2.5 NG/ML
HYDROCODONE CTO UR CFM-MCNC: >2500 NG/ML
HYDROMORPHONE UR CFM-MCNC: 658 NG/ML
LORAZEPAM UR CFM-MCNC: <25 NG/ML
METHADONE UR CFM-MCNC: <25 NG/ML
MIDAZOLAM UR CFM-MCNC: <25 NG/ML
MORPHINE UR CFM-MCNC: <50 NG/ML
NORDIAZEPAM UR CFM-MCNC: <25 NG/ML
NORFENTANYL UR CFM-MCNC: <2.5 NG/ML
NORHYDROCODONE UR CFM-MCNC: >1000 NG/ML
NOROXYCODONE UR CFM-MCNC: <25 NG/ML
NORTAPENTADOL UR CFM-MCNC: <25 NG/ML
NORTRAMADOL UR-MCNC: <50 NG/ML
OXAZEPAM UR CFM-MCNC: <25 NG/ML
OXYCODONE UR CFM-MCNC: <25 NG/ML
OXYMORPHONE UR CFM-MCNC: <25 NG/ML
TAPENTADOL UR CFM-MCNC: <25 NG/ML
TEMAZEPAM UR CFM-MCNC: <25 NG/ML
TRAMADOL UR CFM-MCNC: <50 NG/ML
ZOLPIDEM UR CFM-MCNC: <25 NG/ML
ZOLPIDEM UR-MCNC: <25 NG/ML

## 2024-05-01 PROCEDURE — 97110 THERAPEUTIC EXERCISES: CPT | Mod: GP,CQ | Performed by: PHYSICAL THERAPY ASSISTANT

## 2024-05-01 ASSESSMENT — PAIN - FUNCTIONAL ASSESSMENT: PAIN_FUNCTIONAL_ASSESSMENT: 0-10

## 2024-05-01 ASSESSMENT — PAIN SCALES - GENERAL: PAINLEVEL_OUTOF10: 4

## 2024-05-01 NOTE — PROGRESS NOTES
"Physical Therapy    Physical Therapy Treatment    Patient Name: Janelle Jung  MRN: [id number]  Today's Date: 5/1/2024  Time Calculation  Start Time: 1438  Stop Time: 1525  Time Calculation (min): 47 min    PT Therapeutic Procedures Time Entry  Therapeutic Exercise Time Entry: 47        VISIT# 3  6v 4/18/24 to 6/16/24    Current Problem  1. Postlaminectomy syndrome of lumbar region  Follow Up In Physical Therapy      2. Myofascial pain  Follow Up In Physical Therapy          Subjective      Pt reports she raked the flower beds today.   Precautions  Precautions  STEADI Fall Risk Score (The score of 4 or more indicates an increased risk of falling): 11  Medical Precautions:  (thyroid d/o ; osteoperosis; OA; HTN; scoliosis; hysterectomy; ROBERT TKA; gall bladder removed; bowel resection.)       Pain  Pain Assessment: 0-10  Pain Score: 4  Pain Type: Chronic pain  Pain Location: Back       Objective     Arrived without assistive device, left cane in car. Reports she uses walker or crutches mostly.   Treatments:     EXERCISES       Date 4/29/24 5/1/24     VISIT# #2 #3 # #    REPS REPS REPS REPS   Nustep L1 x 10' L1 x 10'            DF stretch 30\" x 3 30\" x 3            Seated and UE on ball flex and sb 5\"H x 10 each 5\"H x 10 each            tband       row Red 3 x 10 Red 3 x 10     Pull down Red 3 x 10 Red 3 x 10     ir Red 3 x 10 Red 3 x 10     er Red 3 x 10 Red 3 x 10            LTR  5\"H x 10 each            Seated abd bracing  5\"H x 10     Scap retract  3\"H 2 x 10                                                                                                       HEP  x             Assessment:  Pt reports N/T in LLE initially lying supine to perform LTR's. Improved with ceasing activity and sitting up. Pt completed session with no change in back pain. Reports trunk flex stretches help the most. Pt requires consistent cues for rober technique and breath awareness.    OP EDUCATION:   Issued trunk flex stretch, abdominal " bracing and scap retraction.  [id number]    Plan:  progress ROM/strengthening program to improve mobility.     Goals:  Active       postlaminectomy syndrome lumbar; myalgia; posture abnormality       Pt. will c/o less than 4/10 LBP with ADL's.        Start:  04/18/24    Expected End:  07/18/24            Pt. Will be indep with progressive HEP to cont. Progress made in PT.         Start:  04/18/24    Expected End:  07/18/24            Pt. will increase lumbar and thoracic motion to wfl to increase mobility.        Start:  04/18/24    Expected End:  07/18/24

## 2024-05-06 ENCOUNTER — TREATMENT (OUTPATIENT)
Dept: PHYSICAL THERAPY | Facility: HOSPITAL | Age: 84
End: 2024-05-06
Payer: MEDICARE

## 2024-05-06 DIAGNOSIS — M79.18 MYOFASCIAL PAIN: ICD-10-CM

## 2024-05-06 DIAGNOSIS — M96.1 POSTLAMINECTOMY SYNDROME OF LUMBAR REGION: ICD-10-CM

## 2024-05-06 DIAGNOSIS — R29.3 POSTURE ABNORMALITY: Primary | ICD-10-CM

## 2024-05-06 PROCEDURE — 97110 THERAPEUTIC EXERCISES: CPT | Mod: GP | Performed by: PHYSICAL THERAPIST

## 2024-05-06 ASSESSMENT — PAIN SCALES - GENERAL: PAINLEVEL_OUTOF10: 5 - MODERATE PAIN

## 2024-05-06 ASSESSMENT — PAIN - FUNCTIONAL ASSESSMENT: PAIN_FUNCTIONAL_ASSESSMENT: 0-10

## 2024-05-06 NOTE — PROGRESS NOTES
"Physical Therapy    Physical Therapy Treatment    Patient Name: Janelle Jung  MRN: [id number]  Today's Date: 5/6/2024  Time Calculation  Start Time: 1515  Stop Time: 1600  Time Calculation (min): 45 min    PT Therapeutic Procedures Time Entry  Therapeutic Exercise Time Entry: 45        VISIT# 4  6v 4/18/24 to 6/16/24    Current Problem  1. Posture abnormality        2. Postlaminectomy syndrome of lumbar region  Follow Up In Physical Therapy      3. 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 She is with stomach pain and trouble today, from her past bowel resection, and feeling worn out a bit.     Precautions  Precautions  STEADI Fall Risk Score (The score of 4 or more indicates an increased risk of falling): 11  Medical Precautions:  (thyroid d/o ; osteoperosis; OA; HTN; scoliosis; hysterectomy; ROBERT TKA; gall bladder removed; bowel resection.)       Pain  Pain Assessment: 0-10  Pain Score: 5 - Moderate pain  Pain Type: Chronic pain  Pain Location: Back       Objective     Arrived without assistive device.    Increased reps   Treatments:   Visit 1: 4-18-24 Eval and demo'd self breathing with equal rib cage expansion with ROBERT UE feedback; lumbar rotation supine.   EXERCISES       Date 4/29/24 5/1/24 5/6/24    VISIT# #2 #3 #4 #    REPS REPS REPS REPS   Nustep L1 x 10' L1 x 10' L1 10'           DF stretch 30\" x 3 30\" x 3 30\"x3           Seated and UE on ball flex and sb 5\"H x 10 each 5\"H x 10 each 10x ea x 2           tband       row Red 3 x 10 Red 3 x 10 Gr. 10x2    Pull down Red 3 x 10 Red 3 x 10 Gr 10x2    ir Red 3 x 10 Red 3 x 10 Gr 10x2    er Red 3 x 10 Red 3 x 10 Gr 10x2           LTR  5\"H x 10 each            Seated abd bracing  5\"H x 10     Scap retract  3\"H 2 x 10                                                                                                       HEP  x             Assessment:  Pt completed all therEx " today and states she feels better after therEx.     OP EDUCATION:  Cont.      Plan:  progress ROM/strengthening program to improve mobility.     Goals:  Active       postlaminectomy syndrome lumbar; myalgia; posture abnormality       Pt. will c/o less than 4/10 LBP with ADL's.        Start:  04/18/24    Expected End:  07/18/24            Pt. Will be indep with progressive HEP to cont. Progress made in PT.         Start:  04/18/24    Expected End:  07/18/24            Pt. will increase lumbar and thoracic motion to wfl to increase mobility.        Start:  04/18/24    Expected End:  07/18/24

## 2024-05-08 ENCOUNTER — APPOINTMENT (OUTPATIENT)
Dept: PHYSICAL THERAPY | Facility: HOSPITAL | Age: 84
End: 2024-05-08
Payer: MEDICARE

## 2024-05-13 ENCOUNTER — TREATMENT (OUTPATIENT)
Dept: PHYSICAL THERAPY | Facility: HOSPITAL | Age: 84
End: 2024-05-13
Payer: MEDICARE

## 2024-05-13 DIAGNOSIS — M79.18 MYOFASCIAL PAIN: ICD-10-CM

## 2024-05-13 DIAGNOSIS — M96.1 POSTLAMINECTOMY SYNDROME OF LUMBAR REGION: ICD-10-CM

## 2024-05-13 PROCEDURE — 97110 THERAPEUTIC EXERCISES: CPT | Mod: GP | Performed by: PHYSICAL THERAPIST

## 2024-05-13 ASSESSMENT — PAIN SCALES - GENERAL: PAINLEVEL_OUTOF10: 6

## 2024-05-13 ASSESSMENT — PAIN - FUNCTIONAL ASSESSMENT: PAIN_FUNCTIONAL_ASSESSMENT: 0-10

## 2024-05-13 NOTE — PROGRESS NOTES
Physical Therapy    Physical Therapy Treatment    Patient Name: Janelle Jung  MRN: [id number]  Today's Date: 5/13/2024  Time Calculation  Start Time: 1520  Stop Time: 1600  Time Calculation (min): 40 min    PT Therapeutic Procedures Time Entry  Therapeutic Exercise Time Entry: 40        VISIT# 5  6v 4/18/24 to 6/16/24    Current Problem  1. Postlaminectomy syndrome of lumbar region  Follow Up In Physical Therapy      2. 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 She is with stomach pain and trouble today, from her past bowel resection, and feeling worn out a bit.   She cont. With pain that is worsening. States she knows her curvature is worsening, and is getting pins and needles in her back and down her legs and is worried. Pt. Will cont. With pain management and is worried something may be new going on in there.     Precautions  Precautions  STEADI Fall Risk Score (The score of 4 or more indicates an increased risk of falling): 11  Medical Precautions:  (thyroid d/o ; osteoperosis; OA; HTN; scoliosis; hysterectomy; ROBERT TKA; gall bladder removed; bowel resection.)       Pain  Pain Assessment: 0-10  Pain Score: 6  Pain Type: Chronic pain  Pain Location: Back       Objective     Pt. Is using her s.C. today.   Posture:    Scoliotic posture: L LE shorter; L thoracic rotation. Positive: rib hump with Hernandez test.        Range of Motion:    AROM: lumbar:  Flex: wnl  Ext: wfl  SB: L25; R20  Rot: L wfl; R limited by 30%    Thoracic AROM:  All limited by ~30%     Strength:    ROBERT LE strength: wnl    ROBERT UE strength: 5-/5     Flexibility:    HS: L causes pain : 70;   R : 65     Palpation:  Tender mid thoracic and lumbar area.      Special Tests:  SLR: (+) ROBERT     Gait:. Pt. Amb. With S.C. R.  Treatments:   Visit 1: 4-18-24 Eval and demo'd self breathing with equal rib cage expansion with ROBERT UE feedback; lumbar rotation supine.  "  EXERCISES       Date 4/29/24 5/1/24 5/6/24 5/13/24   VISIT# #2 #3 #4 #5    REPS REPS REPS REPS   Nustep L1 x 10' L1 x 10' L1 10' L2 10'          DF stretch 30\" x 3 30\" x 3 30\"x3 30\"x3          Seated and UE on ball flex and sb 5\"H x 10 each 5\"H x 10 each 10x ea x 2           tband       row Red 3 x 10 Red 3 x 10 Gr. 10x2    Pull down Red 3 x 10 Red 3 x 10 Gr 10x2    ir Red 3 x 10 Red 3 x 10 Gr 10x2    er Red 3 x 10 Red 3 x 10 Gr 10x2           LTR  5\"H x 10 each            Seated abd bracing  5\"H x 10     Scap retract  3\"H 2 x 10         Recheck goals                                                                                              HEP  x         Oswestry 51    Assessment:  Pt is with progress with thoracic and lumbar ROM, and increase in UE strength. She cont. With posture abnormalities, and pain, and limited ROM. Pt. Will cont. Indep HEP and follow up with her      OP EDUCATION:  Cont HEP     Plan:  Discharge PT     Goals:  Active       postlaminectomy syndrome lumbar; myalgia; posture abnormality       Pt. will c/o less than 4/10 LBP with ADL's.  (Progressing)       Start:  04/18/24    Expected End:  07/18/24            Pt. Will be indep with progressive HEP to cont. Progress made in PT.   (Progressing)       Start:  04/18/24    Expected End:  07/18/24            Pt. will increase lumbar and thoracic motion to wfl to increase mobility.  (Progressing)       Start:  04/18/24    Expected End:  07/18/24               "

## 2024-05-23 ENCOUNTER — TELEPHONE (OUTPATIENT)
Dept: PAIN MEDICINE | Facility: HOSPITAL | Age: 84
End: 2024-05-23

## 2024-05-23 ENCOUNTER — OFFICE VISIT (OUTPATIENT)
Dept: PAIN MEDICINE | Facility: HOSPITAL | Age: 84
End: 2024-05-23
Payer: MEDICARE

## 2024-05-23 VITALS
HEIGHT: 62 IN | BODY MASS INDEX: 29.81 KG/M2 | HEART RATE: 81 BPM | SYSTOLIC BLOOD PRESSURE: 128 MMHG | RESPIRATION RATE: 16 BRPM | DIASTOLIC BLOOD PRESSURE: 66 MMHG | WEIGHT: 162 LBS | OXYGEN SATURATION: 94 %

## 2024-05-23 DIAGNOSIS — M96.1 LUMBAR POSTLAMINECTOMY SYNDROME: Primary | ICD-10-CM

## 2024-05-23 DIAGNOSIS — M47.816 ARTHRITIS OF LUMBAR SPINE: ICD-10-CM

## 2024-05-23 DIAGNOSIS — M96.1 POSTLAMINECTOMY SYNDROME OF LUMBAR REGION: ICD-10-CM

## 2024-05-23 DIAGNOSIS — M54.16 CHRONIC LUMBAR RADICULOPATHY: ICD-10-CM

## 2024-05-23 PROCEDURE — 1036F TOBACCO NON-USER: CPT | Performed by: CLINICAL NURSE SPECIALIST

## 2024-05-23 PROCEDURE — 1159F MED LIST DOCD IN RCRD: CPT | Performed by: CLINICAL NURSE SPECIALIST

## 2024-05-23 PROCEDURE — 99214 OFFICE O/P EST MOD 30 MIN: CPT | Performed by: CLINICAL NURSE SPECIALIST

## 2024-05-23 PROCEDURE — 1160F RVW MEDS BY RX/DR IN RCRD: CPT | Performed by: CLINICAL NURSE SPECIALIST

## 2024-05-23 ASSESSMENT — ENCOUNTER SYMPTOMS
OCCASIONAL FEELINGS OF UNSTEADINESS: 1
DEPRESSION: 0
LOSS OF SENSATION IN FEET: 1

## 2024-05-23 ASSESSMENT — PATIENT HEALTH QUESTIONNAIRE - PHQ9
SUM OF ALL RESPONSES TO PHQ9 QUESTIONS 1 AND 2: 0
2. FEELING DOWN, DEPRESSED OR HOPELESS: NOT AT ALL
1. LITTLE INTEREST OR PLEASURE IN DOING THINGS: NOT AT ALL

## 2024-05-23 NOTE — TELEPHONE ENCOUNTER
FYI - Per request from Radha Bullard CNP tried locating information on pt's SCS.  We need to know who did the surgery and when.  Called [facility] Tejeda, and left a VM to call us back.  When she calls back we will request above information and relay it to Radha Bullard CNP.

## 2024-05-23 NOTE — PROGRESS NOTES
Subjective   Patient ID: Janelle Jung is a 83 y.o. female who presents for thoracic and lumbar pain  HPI    83-year-old female with history of chronic low back pain status post L3-S1 fusion with L3-5 laminectomy.  Patient has a spinal cord stimulator with [employer] for lumbar radicular symptoms/lumbar postlaminectomy syndrome.  She has tried diagnostic lumbar facet injections and caudal JOHNNA with no relief.  Developed mid to low back pain which is nonradiating and was sent for thoracic x-ray.  Thoracic x-ray consistent with multilevel spondylosis with no acute fractures noted.  Sent the patient for formal course of physical therapy to treat a myofascial component of her thoracic back pain as well as address her posture, gait and balance.  Managing her pain with Hydrocodone 5 mg up to 2 times per day as needed, duloxetine 30 mg/day and Robaxin for muscle tightness and spasm.  Nortriptyline per PCP.  Presents at today's office visit with low back pain which can radiate into the thoracic region but most often radiating to her bilateral lower extremities.  She states that when she was doing physical therapy and when she lays flat the pain is excruciating and radiates into her bilateral lower extremities.  She describes this pain as sharp, stabbing and electrical.  She notices an increase in numbness/tingling in her lower extremities with this pain.  She does see some improvement in her mid back thoracic/myofascial pain with recent physical therapy.  She has noted that it was helpful for her posture.  She continues to have balance issues.  Continued numbness and tingling in her lower extremities.  She denies any weakness in her lower extremities.  Denies any changes in bowel/bladder function, although she has chronic incontinence of bowel and bladder.  Patient states her lumbar pain was increased with physical therapy as well as laying flat and bending forward.  She has been using her spinal cord stimulator,  "although she feels that it is not providing significant relief for lumbar radicular symptoms.  She is continuing home physical therapy exercises and stretches addressing her strength, myofascial pain as well as balance.  Continued benefit from hydrocodone 5 mg 2 times per day, duloxetine and occasional Tylenol.  She does feel the addition of Robaxin has been helpful for muscle tightness/spasm and myofascial pain.      ocation of Pain:pt is here for interval follow up and medication count. Patient states she is having low back pain and that moves up the back to the middle back and neck. New \"pins and needles\" from mid back to the feet especially while lying down.      Pain Score: 6/10     Treatment: Norco 5/325mg BID  Count: 29 pills left in rx bottle  Fill Date: 4/24/24  Last Dose Taken: 5/23/24 1 dose and takes 1-2 tabs a day  Efficacy: 20% relief   Side effects: constipation- Miralax     Narcan: exp 09/2024     Other medications: Nortriptyline 10mg/ Methocarbomal-no refill needed/ Duloxetine-refill needed ([facility])     Injection/Procedures: SCS [employer]- helps with radicular pain but not lower back pain/ Bilateral Lumbar MBB - 0% relief with 2nd injection    PT evaluation and insurance approved so will start PT  OARRS:  Radha Bullard, APRN-CNP, APRN-CNS on 5/23/2024  3:16 PM  I have personally reviewed the OARRS report for Janelle Jung. I have considered the risks of abuse, dependence, addiction and diversion    Is the patient prescribed a combination of a benzodiazepine and opioid?  No    Last Urine Drug Screen / ordered today: No  Recent Results (from the past 8760 hour(s))   Confirmation Opiate/Opioid/Benzo Prescription Compliance    Collection Time: 04/24/24  4:18 PM   Result Value Ref Range    Clonazepam <25 <25 ng/mL    7-Aminoclonazepam <25 <25 ng/mL    Alprazolam <25 <25 ng/mL    Alpha-Hydroxyalprazolam <25 <25 ng/mL    Midazolam <25 <25 ng/mL    Alpha-Hydroxymidazolam <25 <25 ng/mL    " Chlordiazepoxide <25 <25 ng/mL    Diazepam <25 <25 ng/mL    Nordiazepam <25 <25 ng/mL    Temazepam <25 <25 ng/mL    Oxazepam <25 <25 ng/mL    Lorazepam <25 <25 ng/mL    Methadone <25 <25 ng/mL    EDDP <25 <25 ng/mL    6-Acetylmorphine <25 <25 ng/mL    Codeine <50 <50 ng/mL    Hydrocodone >2,500 (H) <25 ng/mL    Hydromorphone 658 (H) <25 ng/mL    Morphine  <50 <50 ng/mL    Norhydrocodone >1,000 (H) <25 ng/mL    Noroxycodone <25 <25 ng/mL    Oxycodone <25 <25 ng/mL    Oxymorphone <25 <25 ng/mL    Fentanyl <2.5 <2.5 ng/mL    Norfentanyl <2.5 <2.5 ng/mL    Tramadol <50 <50 ng/mL    O-Desmethyltramadol <50 <50 ng/mL    Zolpidem <25 <25 ng/mL    Zolpidem Metabolite (ZCA) <25 <25 ng/mL   Screen Opiate/Opioid/Benzo Prescription Compliance    Collection Time: 04/24/24  4:18 PM   Result Value Ref Range    Creatinine, Urine Random 242.8 20.0 - 320.0 mg/dL    Amphetamine Screen, Urine Presumptive Negative Presumptive Negative    Barbiturate Screen, Urine Presumptive Negative Presumptive Negative    Cannabinoid Screen, Urine Presumptive Negative Presumptive Negative    Cocaine Metabolite Screen, Urine Presumptive Negative Presumptive Negative    PCP Screen, Urine Presumptive Negative Presumptive Negative   Tapentadol Confirmation, Urine    Collection Time: 04/24/24  4:18 PM   Result Value Ref Range    Tapentadol <25 <25 ng/mL    N-Desmethyltapentadol <25 <25 ng/mL   Buprenorphine Confirm,Urine    Collection Time: 04/24/24  4:18 PM   Result Value Ref Range    BUPRENORPHINE GLUC, URINE <5 ng/mL    BUPRENORPHINE ,URINE <2 ng/mL    NALOXONE, URINE <100 ng/mL    NORBUPRENORPHINE GLUC,URINE <5 ng/mL    NORBUPRENORPHINE, URINE <2 ng/mL     Results are as expected.     Controlled Substance Agreement:  Date of the Last Agreement: 9/5/23  Reviewed Controlled Substance Agreement including but not limited to the benefits, risks, and alternatives to treatment with a Controlled Substance medication(s).    Monitoring and  compliance:    ORT:    PDUQ:    Office Agreement:      Review of Systems    ROS:   General: No fevers, chills, weight loss  Skin: Negative for lesions  Eyes: No acute vision changes  Ears: No vertigo  Nose, mouth, throat: No difficulty swallowing or speaking  Respiratory: No cough, shortness of breath, cyanosis  Cardiovascular: Negative for chest pain syncope or palpitation  Gastrointestinal: No constipation, nausea, vomiting  Neurological: Negative for headache, positive for:  Psychological: Negative for severe or debilitating anxiety, depression. Negative memory loss  Musculoskeletal: Positive for  Endocrine: Negative for weight gain, appetite changes, excessive sweating  Allergy/immune: Negative    All 13 systems were reviewed and are within normal levels except as noted or in the history of present illness.  Positive or pertinent negative responses are noted or were in the history of present illness. As noted, the patient denies significant or impairing weakness in the bilateral upper and lower extremities, medication induced constipation, and bowel or bladder incontinence.     Current Outpatient Medications:     acetaminophen (Tylenol) 500 mg tablet, Take by mouth., Disp: , Rfl:     amLODIPine (Norvasc) 10 mg tablet, Take 1 tablet (10 mg) by mouth once daily., Disp: , Rfl:     biotin 1 mg tablet, Take 1 tablet (1 mg) by mouth once daily., Disp: , Rfl:     Bystolic 10 mg tablet, Take 1 tablet (10 mg) by mouth once daily., Disp: , Rfl:     cholecalciferol (Vitamin D-3) 50 mcg (2,000 unit) capsule, Take by mouth., Disp: , Rfl:     DULoxetine (Cymbalta) 30 mg DR capsule, Take 1 capsule (30 mg) by mouth once daily. Do not crush or chew., Disp: 30 capsule, Rfl: 2    HYDROcodone-acetaminophen (Norco) 5-325 mg tablet, Take 1 tablet by mouth 2 times a day as needed for severe pain (7 - 10)., Disp: 60 tablet, Rfl: 0    loperamide (Imodium A-D) 2 mg tablet, Take 1 tablet (2 mg) by mouth every 4 hours if needed., Disp: ,  "Rfl:     methocarbamol (Robaxin) 500 mg tablet, Take 0.5 tablets (250 mg) by mouth 2 times a day as needed for muscle spasms., Disp: 30 tablet, Rfl: 2    naloxone (Narcan) 4 mg/0.1 mL nasal spray, Administer into affected nostril(s)., Disp: , Rfl:     nortriptyline (Pamelor) 10 mg capsule, Take 1 capsule (10 mg) by mouth once daily., Disp: , Rfl:     rosuvastatin (Crestor) 20 mg tablet, Take 2 tablets (40 mg) by mouth once daily., Disp: , Rfl:     spironolactone (Aldactone) 25 mg tablet, Take 1 tablet (25 mg) by mouth once daily., Disp: , Rfl:     triamcinolone (Kenalog) 0.1 % ointment, Apply topically 3 times a day., Disp: , Rfl:     omeprazole (PriLOSEC) 40 mg DR capsule, Take 1 capsule (40 mg) by mouth., Disp: , Rfl:      Past Medical History:   Diagnosis Date    Essential (primary) hypertension     Hypertension, well controlled        No past surgical history on file.     No family history on file.     Allergies   Allergen Reactions    Adhesive Tape-Silicones Unknown    Iodinated Contrast Media Unknown    Iodine Unknown    Latex Unknown    Metronidazole Unknown    Niacin Unknown    Sulfamethoxazole Unknown    Sulfites Unknown        Objective     Visit Vitals  /66   Pulse 81   Resp 16   Ht 1.575 m (5' 2\")   Wt 73.5 kg (162 lb)   SpO2 94%   BMI 29.63 kg/m²   Smoking Status Former   BSA 1.79 m²        Physical Exam    PE:  General: Well-developed, well-nourished, no acute distress. The patient demonstrates no pain behavior, symptom magnification or overt drug-seeking behavior.  Eye: Pupils appropriate for room lighting  Neck/thyroid: No obvious goiter or enlargement of neck noted  Respiratory exam: Normal respiratory effort, unlabored respiration. No accessory muscle use noted  Cardiac exam: Bilateral radial pulses intact  Abdominal: Nondistended  Spine, thoracic: Minimal tenderness to paraspinous musculature thoracic region.  No tenderness over thoracic spine.  No gross step-offs noted.  Spine, lumbar: The " patient is able to rise from a seated to standing position without hesitancy, push off, or delay. Gait is slow and deliberate continues to favor right lower extremity.  Posture forward leaning.   Tenderness to paraspinous musculature is noted lower lumbar spine.  Flexion intact with extension increasing pain.  Positive straight leg raise right lower extremity.  Neurologic exam: Muscle strength is antigravity in all 4 extremities.  Diminished sensation left lower extremity from the knee distally to her foot.  Equal muscle strength bilateral lower extremities 5/5.  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  Patient has a spinal cord stimulator targeting lumbar radicular symptoms/lumbar postlaminectomy syndrome.  Currently does not feel that her stimulator is providing adequate relief for lumbar radicular pain.  Patient presents at today's visit after completing physical therapy targeting mid to low back pain as well as gait, posture and balance.  She did get some relief of her mid back/thoracic pain and feels that her posture has improved with physical therapy.  She has noted an increase in low back pain with radiation to her bilateral lower extremities accompanied by intermittent numbness/tingling.  Lumbar pain is increasing when she is laying flat and during her physical therapy.  Does not feel that her  medication regimen or her stimulator are managing this current pain.  Due to significant increase in lumbar radicular symptoms accompanied by numbness/tingling lower extremities we will send the patient for a lumbar MRI to rule out any acute changes.  Most recent lumbar MRI from 2022 .  Previously failed gabapentin. Maintained on a low dose of Norco, duloxetine, Nortriptyline and Robaxin for muscle tightness and spasm.  She does feel that the addition of Robaxin has been helpful for mid back/thoracic myofascial pain.  She also feels that physical therapy helped her mid back thoracic/myofascial pain in her posture.  She felt that physical therapy increased her low back radicular symptoms.  Plan reviewed with patient at today's office visit.    -Considering patient has worsening symptoms of lumbar radiculopathy and failed conservative treatment including physical therapy, injections and medications; we will send for an updated MRI.     -Continue hydrocodone 5 mg up to 2 times per day as needed for pain.  Patient continues to use sparingly and endorses relief when she does use this medication.  -Continue duloxetine 30 mg daily.  Currently tolerating without adverse effects. May consider increasing this dose in the future.   -Continue a low-dose of Robaxin for muscle tightness/spasm.  Avoiding tizanidine secondary to potential hypotensive effects.  -Patient will continue home therapy exercises and stretches as able.    -Patient will follow up approximately 4 weeks after her MRI for review of imaging and further treatment recommendations.     MEDICAL DECISION MAKING:    My impressions and treatment recommendations were discussed in detail with the patient, who verbalized understanding and had no further questions prior to discharge. Given the patient's report of reduced pain and improved functional ability without adverse effects,  it is reasonable to continue hydrocodone 5mg up to 2 times per day as needed. The terms of  the opioid agreement as well as the potential risks and adverse effects of the patient's medication regimen were discussed in detail. This includes if applicable due to dosage of medication permission to discuss and coordinate care with other treatment providers relevant to the patients condition. The patient verbalized understanding.    Treatment goals were discussed in detail with the patient.  These goals include reduction of pain levels, improved levels of functioning, avoidance of medication side effect and lowest medication dose possible to achieve  these goals.  The patient was in full agreement with these goals.  Also discussed is the understanding that pain may not be eliminated by medication but that the goal of a better sustaining life through use of medication is appropriate.  Lifestyle modifications including weight management, stretching, diet, exercise and smoking are addressed at each office visit.  The patient provided a urine drug screen for routine monitoring and compliance.  This test may be given as frequently as every month based on the patient's individual opiate risk stratification and prescriber concerns for any aberrancies.  This test is indicated given the use of controlled substances for the patient's medical condition.  Unless otherwise noted the prior (s) urine drug testing results were consistent with prescribed medications.  There is no evidence of illicit drug use or additional medications ingested.     Risks and side effects of chronic opioid therapy including but not limited to tolerance, dependence, constipation, hyperalgesia, cognitive side effects, addiction and possible death due to overuse and or misuse were discussed. I also discussed that such medications when co-administered with other sedative agents including but not limited to alcohol, benzodiazepines, sedative hypnotics and illegal drugs could pose life threatening consequences including death.  I also explained the  impact that the administration of such medication has on a patient with obstructive sleep apnea and continued recommendations for use of apnea devices if ordered are prescribed by other physicians.  In order to effectively and safely treat the pain, I also emphasized the importance of compliance with the treatment plan as well as compliance with the terms of the opioid agreement which was reviewed in detail. I explained the importance of being responsible with the medications and to take these only as prescribed, never in excess and never for reasons other than pain reduction. The patient was counseled on keeping the medications safe and locked away from children and other adults as well as disposal methods and options. The patient understood the risks and instructions.      I also discussed with the patient in detail that based on the clinical response to the opioid medications and improvements of activities of daily living, sleep, and work performance in light of compliance with the treatment plan we can continue this form of therapy for the above chronic  pain.  The goal and rationale used for current treatment with chronic opioid medication is to control the pain and alleviate disability induced by the chronic pain condition noted above after failures of other non-opioid and nonpharmacological modalities to treat the chronic pain and the symptoms associated with have failed.  The patient understood the goals in terms of the above treatment plan and had no further questions prior to leaving the office today.    Given the patient's total MED, general use of daily opiates, or other coadministered medications in various classes the patient was offered a prescription for Narcan.  I instructed the patient that Narcan is for emergency use only and is worse suspected or known opiate overdose.  Call 911 prior to administration of this medication to activate the emergency response team.  It is imperative to fill this  medication in order to demonstrate understanding of the gravity of possible side effects including respiratory depression and risk of overdose of this opiate load or medication combination.  As such patients will be required to bring Narcan prescriptions to follow-up appointments as part of the compliance with continued opiate care.    Disclaimer: This note was transcribed using an audio transcription device.  As such, minor errors may be present with regard to spelling, punctuation, and inadvertent word insertion.  Please disregard such errors.

## 2024-05-24 ENCOUNTER — LAB (OUTPATIENT)
Dept: LAB | Facility: LAB | Age: 84
End: 2024-05-24
Payer: MEDICARE

## 2024-05-24 DIAGNOSIS — E78.00 PURE HYPERCHOLESTEROLEMIA, UNSPECIFIED: Primary | ICD-10-CM

## 2024-05-24 DIAGNOSIS — E78.00 PURE HYPERCHOLESTEROLEMIA, UNSPECIFIED: ICD-10-CM

## 2024-05-24 LAB
ALT SERPL W P-5'-P-CCNC: 10 U/L (ref 7–45)
CHOLEST SERPL-MCNC: 139 MG/DL (ref 0–199)
CHOLESTEROL/HDL RATIO: 2.8
HDLC SERPL-MCNC: 49.2 MG/DL
LDLC SERPL CALC-MCNC: 68 MG/DL
NON HDL CHOLESTEROL: 90 MG/DL (ref 0–149)
TRIGL SERPL-MCNC: 109 MG/DL (ref 0–149)
VLDL: 22 MG/DL (ref 0–40)

## 2024-05-24 PROCEDURE — 36415 COLL VENOUS BLD VENIPUNCTURE: CPT

## 2024-05-24 PROCEDURE — 84460 ALANINE AMINO (ALT) (SGPT): CPT

## 2024-05-24 PROCEDURE — 80061 LIPID PANEL: CPT

## 2024-05-24 NOTE — ASSESSMENT & PLAN NOTE
83-year-old female with postlaminectomy syndrome presents for follow up of thoracic as well as lumbar radicular pain.  Patient has a spinal cord stimulator targeting lumbar radicular symptoms/lumbar postlaminectomy syndrome.  Currently does not feel that her stimulator is providing adequate relief for lumbar radicular pain.  Patient presents at today's visit after completing physical therapy targeting mid to low back pain as well as gait, posture and balance.  She did get some relief of her mid back/thoracic pain and feels that her posture has improved with physical therapy.  She has noted an increase in low back pain with radiation to her bilateral lower extremities accompanied by intermittent numbness/tingling.  Lumbar pain is increasing when she is laying flat and during her physical therapy.  Does not feel that her medication regimen or her stimulator are managing this current pain.  Due to significant increase in lumbar radicular symptoms accompanied by numbness/tingling lower extremities we will send the patient for a lumbar MRI to rule out any acute changes.  Most recent lumbar MRI from 2022 .  Previously failed gabapentin. Maintained on a low dose of Norco, duloxetine, Nortriptyline and Robaxin for muscle tightness and spasm.  She does feel that the addition of Robaxin has been helpful for mid back/thoracic myofascial pain.  She also feels that physical therapy helped her mid back thoracic/myofascial pain in her posture.  She felt that physical therapy increased her low back radicular symptoms.  Plan reviewed with patient at today's office visit.    -Considering patient has worsening symptoms of lumbar radiculopathy and failed conservative treatment including physical therapy, injections and medications; we will send for an updated MRI.     -Continue hydrocodone 5 mg up to 2 times per day as needed for pain.  Patient continues to use sparingly and endorses relief when she does use this medication.  -Continue  duloxetine 30 mg daily.  Currently tolerating without adverse effects. May consider increasing this dose in the future.   -Continue a low-dose of Robaxin for muscle tightness/spasm.  Avoiding tizanidine secondary to potential hypotensive effects.  -Patient will continue home therapy exercises and stretches as able.    -Patient will follow up approximately 4 weeks after her MRI for review of imaging and further treatment recommendations.     MEDICAL DECISION MAKING:    My impressions and treatment recommendations were discussed in detail with the patient, who verbalized understanding and had no further questions prior to discharge. Given the patient's report of reduced pain and improved functional ability without adverse effects,  it is reasonable to continue hydrocodone 5mg up to 2 times per day as needed. The terms of the opioid agreement as well as the potential risks and adverse effects of the patient's medication regimen were discussed in detail. This includes if applicable due to dosage of medication permission to discuss and coordinate care with other treatment providers relevant to the patients condition. The patient verbalized understanding.    Treatment goals were discussed in detail with the patient.  These goals include reduction of pain levels, improved levels of functioning, avoidance of medication side effect and lowest medication dose possible to achieve  these goals.  The patient was in full agreement with these goals.  Also discussed is the understanding that pain may not be eliminated by medication but that the goal of a better sustaining life through use of medication is appropriate.  Lifestyle modifications including weight management, stretching, diet, exercise and smoking are addressed at each office visit.  The patient provided a urine drug screen for routine monitoring and compliance.  This test may be given as frequently as every month based on the patient's individual opiate risk  stratification and prescriber concerns for any aberrancies.  This test is indicated given the use of controlled substances for the patient's medical condition.  Unless otherwise noted the prior (s) urine drug testing results were consistent with prescribed medications.  There is no evidence of illicit drug use or additional medications ingested.     Risks and side effects of chronic opioid therapy including but not limited to tolerance, dependence, constipation, hyperalgesia, cognitive side effects, addiction and possible death due to overuse and or misuse were discussed. I also discussed that such medications when co-administered with other sedative agents including but not limited to alcohol, benzodiazepines, sedative hypnotics and illegal drugs could pose life threatening consequences including death.  I also explained the impact that the administration of such medication has on a patient with obstructive sleep apnea and continued recommendations for use of apnea devices if ordered are prescribed by other physicians.  In order to effectively and safely treat the pain, I also emphasized the importance of compliance with the treatment plan as well as compliance with the terms of the opioid agreement which was reviewed in detail. I explained the importance of being responsible with the medications and to take these only as prescribed, never in excess and never for reasons other than pain reduction. The patient was counseled on keeping the medications safe and locked away from children and other adults as well as disposal methods and options. The patient understood the risks and instructions.      I also discussed with the patient in detail that based on the clinical response to the opioid medications and improvements of activities of daily living, sleep, and work performance in light of compliance with the treatment plan we can continue this form of therapy for the above chronic  pain.  The goal and rationale used  for current treatment with chronic opioid medication is to control the pain and alleviate disability induced by the chronic pain condition noted above after failures of other non-opioid and nonpharmacological modalities to treat the chronic pain and the symptoms associated with have failed.  The patient understood the goals in terms of the above treatment plan and had no further questions prior to leaving the office today.    Given the patient's total MED, general use of daily opiates, or other coadministered medications in various classes the patient was offered a prescription for Narcan.  I instructed the patient that Narcan is for emergency use only and is worse suspected or known opiate overdose.  Call 911 prior to administration of this medication to activate the emergency response team.  It is imperative to fill this medication in order to demonstrate understanding of the gravity of possible side effects including respiratory depression and risk of overdose of this opiate load or medication combination.  As such patients will be required to bring Narcan prescriptions to follow-up appointments as part of the compliance with continued opiate care.    Disclaimer: This note was transcribed using an audio transcription device.  As such, minor errors may be present with regard to spelling, punctuation, and inadvertent word insertion.  Please disregard such errors.

## 2024-06-06 ENCOUNTER — APPOINTMENT (OUTPATIENT)
Dept: PAIN MEDICINE | Facility: HOSPITAL | Age: 84
End: 2024-06-06
Payer: MEDICARE

## 2024-06-11 DIAGNOSIS — M96.1 LUMBAR POSTLAMINECTOMY SYNDROME: Primary | ICD-10-CM

## 2024-06-11 DIAGNOSIS — Z79.891 ENCOUNTER FOR LONG-TERM USE OF OPIATE ANALGESIC: ICD-10-CM

## 2024-06-11 RX ORDER — HYDROCODONE BITARTRATE AND ACETAMINOPHEN 5; 325 MG/1; MG/1
1 TABLET ORAL 2 TIMES DAILY PRN
Qty: 60 TABLET | Refills: 0 | Status: SHIPPED | OUTPATIENT
Start: 2024-06-11 | End: 2024-07-11

## 2024-06-11 NOTE — TELEPHONE ENCOUNTER
Pt is requesting refill of  Norco 5-325 mg 1 tablet po BID                                                          LV: 5/23/24                   NV: None - JS calling pt to schedule OV                 OARRS reviewed with [facility]: 4/24/24  #60/30 days                        Pended RX to Dr. Rodriguez for transmission to pharmacy

## 2024-06-22 ENCOUNTER — HOSPITAL ENCOUNTER (OUTPATIENT)
Dept: RADIOLOGY | Facility: HOSPITAL | Age: 84
Discharge: HOME | End: 2024-06-22
Payer: MEDICARE

## 2024-06-22 DIAGNOSIS — M96.1 LUMBAR POSTLAMINECTOMY SYNDROME: ICD-10-CM

## 2024-06-22 DIAGNOSIS — M47.816 ARTHRITIS OF LUMBAR SPINE: ICD-10-CM

## 2024-06-22 PROCEDURE — 72148 MRI LUMBAR SPINE W/O DYE: CPT | Performed by: RADIOLOGY

## 2024-06-22 PROCEDURE — 72148 MRI LUMBAR SPINE W/O DYE: CPT

## 2024-07-09 ENCOUNTER — LAB (OUTPATIENT)
Dept: LAB | Facility: LAB | Age: 84
End: 2024-07-09
Payer: MEDICARE

## 2024-07-09 DIAGNOSIS — G89.29 OTHER CHRONIC PAIN: ICD-10-CM

## 2024-07-09 DIAGNOSIS — R53.1 WEAKNESS: Primary | ICD-10-CM

## 2024-07-09 DIAGNOSIS — R53.1 WEAKNESS: ICD-10-CM

## 2024-07-09 DIAGNOSIS — K58.0 IRRITABLE BOWEL SYNDROME WITH DIARRHEA: ICD-10-CM

## 2024-07-09 LAB
ALBUMIN SERPL BCP-MCNC: 4.2 G/DL (ref 3.4–5)
ALP SERPL-CCNC: 64 U/L (ref 33–136)
ALT SERPL W P-5'-P-CCNC: 9 U/L (ref 7–45)
ANION GAP SERPL CALC-SCNC: 11 MMOL/L (ref 10–20)
AST SERPL W P-5'-P-CCNC: 14 U/L (ref 9–39)
BASOPHILS # BLD AUTO: 0.11 X10*3/UL (ref 0–0.1)
BASOPHILS NFR BLD AUTO: 1 %
BILIRUB SERPL-MCNC: 0.5 MG/DL (ref 0–1.2)
BUN SERPL-MCNC: 17 MG/DL (ref 6–23)
CALCIUM SERPL-MCNC: 9.7 MG/DL (ref 8.6–10.3)
CHLORIDE SERPL-SCNC: 108 MMOL/L (ref 98–107)
CO2 SERPL-SCNC: 25 MMOL/L (ref 21–32)
CREAT SERPL-MCNC: 0.91 MG/DL (ref 0.5–1.05)
EGFRCR SERPLBLD CKD-EPI 2021: 62 ML/MIN/1.73M*2
EOSINOPHIL # BLD AUTO: 0.23 X10*3/UL (ref 0–0.4)
EOSINOPHIL NFR BLD AUTO: 2 %
ERYTHROCYTE [DISTWIDTH] IN BLOOD BY AUTOMATED COUNT: 13.8 % (ref 11.5–14.5)
GLUCOSE SERPL-MCNC: 91 MG/DL (ref 74–99)
HCT VFR BLD AUTO: 41.6 % (ref 36–46)
HGB BLD-MCNC: 13.4 G/DL (ref 12–16)
IMM GRANULOCYTES # BLD AUTO: 0.04 X10*3/UL (ref 0–0.5)
IMM GRANULOCYTES NFR BLD AUTO: 0.4 % (ref 0–0.9)
LYMPHOCYTES # BLD AUTO: 2.76 X10*3/UL (ref 0.8–3)
LYMPHOCYTES NFR BLD AUTO: 24.3 %
MAGNESIUM SERPL-MCNC: 2.06 MG/DL (ref 1.6–2.4)
MCH RBC QN AUTO: 28.8 PG (ref 26–34)
MCHC RBC AUTO-ENTMCNC: 32.2 G/DL (ref 32–36)
MCV RBC AUTO: 90 FL (ref 80–100)
MONOCYTES # BLD AUTO: 1.34 X10*3/UL (ref 0.05–0.8)
MONOCYTES NFR BLD AUTO: 11.8 %
NEUTROPHILS # BLD AUTO: 6.86 X10*3/UL (ref 1.6–5.5)
NEUTROPHILS NFR BLD AUTO: 60.5 %
NRBC BLD-RTO: 0 /100 WBCS (ref 0–0)
PLATELET # BLD AUTO: 358 X10*3/UL (ref 150–450)
POTASSIUM SERPL-SCNC: 4.4 MMOL/L (ref 3.5–5.3)
PROT SERPL-MCNC: 6.2 G/DL (ref 6.4–8.2)
RBC # BLD AUTO: 4.65 X10*6/UL (ref 4–5.2)
SODIUM SERPL-SCNC: 140 MMOL/L (ref 136–145)
TSH SERPL-ACNC: 0.71 MIU/L (ref 0.44–3.98)
VIT B12 SERPL-MCNC: 225 PG/ML (ref 211–911)
WBC # BLD AUTO: 11.3 X10*3/UL (ref 4.4–11.3)

## 2024-07-09 PROCEDURE — 80053 COMPREHEN METABOLIC PANEL: CPT

## 2024-07-09 PROCEDURE — 82607 VITAMIN B-12: CPT

## 2024-07-09 PROCEDURE — 83735 ASSAY OF MAGNESIUM: CPT

## 2024-07-09 PROCEDURE — 36415 COLL VENOUS BLD VENIPUNCTURE: CPT

## 2024-07-09 PROCEDURE — 85025 COMPLETE CBC W/AUTO DIFF WBC: CPT

## 2024-07-09 PROCEDURE — 84443 ASSAY THYROID STIM HORMONE: CPT

## 2024-07-10 ENCOUNTER — APPOINTMENT (OUTPATIENT)
Dept: CARDIOLOGY | Facility: HOSPITAL | Age: 84
End: 2024-07-10
Payer: MEDICARE

## 2024-07-10 ENCOUNTER — APPOINTMENT (OUTPATIENT)
Dept: RADIOLOGY | Facility: HOSPITAL | Age: 84
End: 2024-07-10
Payer: MEDICARE

## 2024-07-10 ENCOUNTER — HOSPITAL ENCOUNTER (OUTPATIENT)
Facility: HOSPITAL | Age: 84
Setting detail: OBSERVATION
Discharge: HOME | End: 2024-07-11
Attending: STUDENT IN AN ORGANIZED HEALTH CARE EDUCATION/TRAINING PROGRAM | Admitting: INTERNAL MEDICINE
Payer: MEDICARE

## 2024-07-10 DIAGNOSIS — Z79.891 ENCOUNTER FOR LONG-TERM USE OF OPIATE ANALGESIC: ICD-10-CM

## 2024-07-10 DIAGNOSIS — M79.18 MYOFASCIAL PAIN: ICD-10-CM

## 2024-07-10 DIAGNOSIS — R29.3 POSTURE ABNORMALITY: ICD-10-CM

## 2024-07-10 DIAGNOSIS — M54.16 CHRONIC LUMBAR RADICULOPATHY: ICD-10-CM

## 2024-07-10 DIAGNOSIS — I31.39 PERICARDIAL EFFUSION (HHS-HCC): ICD-10-CM

## 2024-07-10 DIAGNOSIS — M96.1 POSTLAMINECTOMY SYNDROME OF LUMBAR REGION: ICD-10-CM

## 2024-07-10 DIAGNOSIS — M47.816 ARTHRITIS OF LUMBAR SPINE: ICD-10-CM

## 2024-07-10 DIAGNOSIS — R00.2 PALPITATIONS: Primary | ICD-10-CM

## 2024-07-10 DIAGNOSIS — E78.5 DYSLIPIDEMIA: ICD-10-CM

## 2024-07-10 DIAGNOSIS — N28.1 RENAL CYST, LEFT: ICD-10-CM

## 2024-07-10 DIAGNOSIS — I10 HYPERTENSION, UNSPECIFIED TYPE: ICD-10-CM

## 2024-07-10 DIAGNOSIS — M96.1 LUMBAR POSTLAMINECTOMY SYNDROME: ICD-10-CM

## 2024-07-10 DIAGNOSIS — G89.29 OTHER CHRONIC PAIN: ICD-10-CM

## 2024-07-10 DIAGNOSIS — R07.9 CHEST PAIN, UNSPECIFIED TYPE: ICD-10-CM

## 2024-07-10 LAB
ALBUMIN SERPL BCP-MCNC: 4.1 G/DL (ref 3.4–5)
ALP SERPL-CCNC: 61 U/L (ref 33–136)
ALT SERPL W P-5'-P-CCNC: 8 U/L (ref 7–45)
ANION GAP SERPL CALC-SCNC: 13 MMOL/L (ref 10–20)
AST SERPL W P-5'-P-CCNC: 15 U/L (ref 9–39)
BASOPHILS # BLD AUTO: 0.08 X10*3/UL (ref 0–0.1)
BASOPHILS NFR BLD AUTO: 0.8 %
BILIRUB SERPL-MCNC: 0.6 MG/DL (ref 0–1.2)
BNP SERPL-MCNC: 673 PG/ML (ref 0–99)
BUN SERPL-MCNC: 15 MG/DL (ref 6–23)
CALCIUM SERPL-MCNC: 9.3 MG/DL (ref 8.6–10.3)
CARDIAC TROPONIN I PNL SERPL HS: 6 NG/L (ref 0–13)
CARDIAC TROPONIN I PNL SERPL HS: 7 NG/L (ref 0–13)
CHLORIDE SERPL-SCNC: 108 MMOL/L (ref 98–107)
CO2 SERPL-SCNC: 20 MMOL/L (ref 21–32)
CREAT SERPL-MCNC: 0.76 MG/DL (ref 0.5–1.05)
CRP SERPL-MCNC: <0.1 MG/DL
EGFRCR SERPLBLD CKD-EPI 2021: 77 ML/MIN/1.73M*2
EOSINOPHIL # BLD AUTO: 0.13 X10*3/UL (ref 0–0.4)
EOSINOPHIL NFR BLD AUTO: 1.4 %
ERYTHROCYTE [DISTWIDTH] IN BLOOD BY AUTOMATED COUNT: 13.5 % (ref 11.5–14.5)
ERYTHROCYTE [SEDIMENTATION RATE] IN BLOOD BY WESTERGREN METHOD: 3 MM/H (ref 0–30)
GLUCOSE SERPL-MCNC: 85 MG/DL (ref 74–99)
HCT VFR BLD AUTO: 39.3 % (ref 36–46)
HGB BLD-MCNC: 13 G/DL (ref 12–16)
IMM GRANULOCYTES # BLD AUTO: 0.04 X10*3/UL (ref 0–0.5)
IMM GRANULOCYTES NFR BLD AUTO: 0.4 % (ref 0–0.9)
INR PPP: 1 (ref 0.9–1.1)
LYMPHOCYTES # BLD AUTO: 2.1 X10*3/UL (ref 0.8–3)
LYMPHOCYTES NFR BLD AUTO: 21.8 %
MAGNESIUM SERPL-MCNC: 2.1 MG/DL (ref 1.6–2.4)
MCH RBC QN AUTO: 29 PG (ref 26–34)
MCHC RBC AUTO-ENTMCNC: 33.1 G/DL (ref 32–36)
MCV RBC AUTO: 88 FL (ref 80–100)
MONOCYTES # BLD AUTO: 1.2 X10*3/UL (ref 0.05–0.8)
MONOCYTES NFR BLD AUTO: 12.5 %
NEUTROPHILS # BLD AUTO: 6.07 X10*3/UL (ref 1.6–5.5)
NEUTROPHILS NFR BLD AUTO: 63.1 %
NRBC BLD-RTO: 0 /100 WBCS (ref 0–0)
PLATELET # BLD AUTO: 286 X10*3/UL (ref 150–450)
POTASSIUM SERPL-SCNC: 4 MMOL/L (ref 3.5–5.3)
PROT SERPL-MCNC: 6.5 G/DL (ref 6.4–8.2)
PROTHROMBIN TIME: 11.8 SECONDS (ref 9.8–12.8)
RBC # BLD AUTO: 4.49 X10*6/UL (ref 4–5.2)
SODIUM SERPL-SCNC: 137 MMOL/L (ref 136–145)
WBC # BLD AUTO: 9.6 X10*3/UL (ref 4.4–11.3)

## 2024-07-10 PROCEDURE — 2500000001 HC RX 250 WO HCPCS SELF ADMINISTERED DRUGS (ALT 637 FOR MEDICARE OP)

## 2024-07-10 PROCEDURE — G0378 HOSPITAL OBSERVATION PER HR: HCPCS

## 2024-07-10 PROCEDURE — 71045 X-RAY EXAM CHEST 1 VIEW: CPT | Performed by: RADIOLOGY

## 2024-07-10 PROCEDURE — 85610 PROTHROMBIN TIME: CPT

## 2024-07-10 PROCEDURE — 84484 ASSAY OF TROPONIN QUANT: CPT

## 2024-07-10 PROCEDURE — 93308 TTE F-UP OR LMTD: CPT | Performed by: STUDENT IN AN ORGANIZED HEALTH CARE EDUCATION/TRAINING PROGRAM

## 2024-07-10 PROCEDURE — 86140 C-REACTIVE PROTEIN: CPT | Performed by: NURSE PRACTITIONER

## 2024-07-10 PROCEDURE — 85025 COMPLETE CBC W/AUTO DIFF WBC: CPT

## 2024-07-10 PROCEDURE — 36415 COLL VENOUS BLD VENIPUNCTURE: CPT

## 2024-07-10 PROCEDURE — 99285 EMERGENCY DEPT VISIT HI MDM: CPT | Mod: 25

## 2024-07-10 PROCEDURE — 85652 RBC SED RATE AUTOMATED: CPT | Performed by: NURSE PRACTITIONER

## 2024-07-10 PROCEDURE — 83735 ASSAY OF MAGNESIUM: CPT

## 2024-07-10 PROCEDURE — 2500000001 HC RX 250 WO HCPCS SELF ADMINISTERED DRUGS (ALT 637 FOR MEDICARE OP): Performed by: NURSE PRACTITIONER

## 2024-07-10 PROCEDURE — 80053 COMPREHEN METABOLIC PANEL: CPT

## 2024-07-10 PROCEDURE — 99223 1ST HOSP IP/OBS HIGH 75: CPT | Performed by: NURSE PRACTITIONER

## 2024-07-10 PROCEDURE — 83880 ASSAY OF NATRIURETIC PEPTIDE: CPT

## 2024-07-10 PROCEDURE — 71045 X-RAY EXAM CHEST 1 VIEW: CPT

## 2024-07-10 PROCEDURE — 96372 THER/PROPH/DIAG INJ SC/IM: CPT | Performed by: STUDENT IN AN ORGANIZED HEALTH CARE EDUCATION/TRAINING PROGRAM

## 2024-07-10 PROCEDURE — 93005 ELECTROCARDIOGRAM TRACING: CPT

## 2024-07-10 PROCEDURE — 2500000002 HC RX 250 W HCPCS SELF ADMINISTERED DRUGS (ALT 637 FOR MEDICARE OP, ALT 636 FOR OP/ED): Performed by: NURSE PRACTITIONER

## 2024-07-10 PROCEDURE — 2500000004 HC RX 250 GENERAL PHARMACY W/ HCPCS (ALT 636 FOR OP/ED): Performed by: STUDENT IN AN ORGANIZED HEALTH CARE EDUCATION/TRAINING PROGRAM

## 2024-07-10 RX ORDER — ONDANSETRON 4 MG/1
4 TABLET, ORALLY DISINTEGRATING ORAL EVERY 8 HOURS PRN
Status: DISCONTINUED | OUTPATIENT
Start: 2024-07-10 | End: 2024-07-11 | Stop reason: HOSPADM

## 2024-07-10 RX ORDER — HYDROCODONE BITARTRATE AND ACETAMINOPHEN 5; 325 MG/1; MG/1
1 TABLET ORAL 2 TIMES DAILY PRN
Status: DISCONTINUED | OUTPATIENT
Start: 2024-07-10 | End: 2024-07-11 | Stop reason: HOSPADM

## 2024-07-10 RX ORDER — POLYETHYLENE GLYCOL 3350 17 G/17G
17 POWDER, FOR SOLUTION ORAL DAILY
Status: DISCONTINUED | OUTPATIENT
Start: 2024-07-11 | End: 2024-07-11 | Stop reason: HOSPADM

## 2024-07-10 RX ORDER — TALC
6 POWDER (GRAM) TOPICAL NIGHTLY PRN
Status: DISCONTINUED | OUTPATIENT
Start: 2024-07-10 | End: 2024-07-11 | Stop reason: HOSPADM

## 2024-07-10 RX ORDER — ROSUVASTATIN CALCIUM 20 MG/1
40 TABLET, COATED ORAL NIGHTLY
Status: DISCONTINUED | OUTPATIENT
Start: 2024-07-10 | End: 2024-07-11 | Stop reason: HOSPADM

## 2024-07-10 RX ORDER — ALENDRONATE SODIUM 70 MG/1
70 TABLET ORAL
COMMUNITY

## 2024-07-10 RX ORDER — AMLODIPINE BESYLATE 10 MG/1
10 TABLET ORAL DAILY
Status: DISCONTINUED | OUTPATIENT
Start: 2024-07-11 | End: 2024-07-11 | Stop reason: HOSPADM

## 2024-07-10 RX ORDER — ONDANSETRON HYDROCHLORIDE 2 MG/ML
4 INJECTION, SOLUTION INTRAVENOUS EVERY 8 HOURS PRN
Status: DISCONTINUED | OUTPATIENT
Start: 2024-07-10 | End: 2024-07-11 | Stop reason: HOSPADM

## 2024-07-10 RX ORDER — CHOLECALCIFEROL (VITAMIN D3) 25 MCG
2000 TABLET ORAL DAILY
Status: DISCONTINUED | OUTPATIENT
Start: 2024-07-11 | End: 2024-07-11 | Stop reason: HOSPADM

## 2024-07-10 RX ORDER — ASPIRIN 81 MG/1
81 TABLET ORAL DAILY
Status: DISCONTINUED | OUTPATIENT
Start: 2024-07-11 | End: 2024-07-11 | Stop reason: HOSPADM

## 2024-07-10 RX ORDER — NORTRIPTYLINE HYDROCHLORIDE 10 MG/1
10 CAPSULE ORAL DAILY
Status: DISCONTINUED | OUTPATIENT
Start: 2024-07-11 | End: 2024-07-11 | Stop reason: HOSPADM

## 2024-07-10 RX ORDER — DULOXETIN HYDROCHLORIDE 30 MG/1
30 CAPSULE, DELAYED RELEASE ORAL DAILY
Status: DISCONTINUED | OUTPATIENT
Start: 2024-07-11 | End: 2024-07-11 | Stop reason: HOSPADM

## 2024-07-10 RX ORDER — METOPROLOL SUCCINATE 50 MG/1
100 TABLET, EXTENDED RELEASE ORAL DAILY
Status: DISCONTINUED | OUTPATIENT
Start: 2024-07-11 | End: 2024-07-11 | Stop reason: HOSPADM

## 2024-07-10 RX ORDER — METHOCARBAMOL 500 MG/1
250 TABLET, FILM COATED ORAL 2 TIMES DAILY PRN
Status: DISCONTINUED | OUTPATIENT
Start: 2024-07-10 | End: 2024-07-11 | Stop reason: HOSPADM

## 2024-07-10 RX ORDER — ENOXAPARIN SODIUM 100 MG/ML
40 INJECTION SUBCUTANEOUS EVERY 24 HOURS
Status: DISCONTINUED | OUTPATIENT
Start: 2024-07-10 | End: 2024-07-11 | Stop reason: HOSPADM

## 2024-07-10 RX ORDER — PANTOPRAZOLE SODIUM 40 MG/1
40 TABLET, DELAYED RELEASE ORAL DAILY
Status: DISCONTINUED | OUTPATIENT
Start: 2024-07-11 | End: 2024-07-11 | Stop reason: HOSPADM

## 2024-07-10 RX ORDER — ONDANSETRON 4 MG/1
4 TABLET, FILM COATED ORAL EVERY 8 HOURS PRN
COMMUNITY

## 2024-07-10 RX ORDER — PANTOPRAZOLE SODIUM 40 MG/10ML
40 INJECTION, POWDER, LYOPHILIZED, FOR SOLUTION INTRAVENOUS DAILY
Status: DISCONTINUED | OUTPATIENT
Start: 2024-07-11 | End: 2024-07-11 | Stop reason: HOSPADM

## 2024-07-10 RX ORDER — NAPROXEN SODIUM 220 MG/1
325 TABLET, FILM COATED ORAL ONCE
Status: COMPLETED | OUTPATIENT
Start: 2024-07-10 | End: 2024-07-10

## 2024-07-10 RX ORDER — SPIRONOLACTONE 25 MG/1
25 TABLET ORAL DAILY
Status: DISCONTINUED | OUTPATIENT
Start: 2024-07-11 | End: 2024-07-11 | Stop reason: HOSPADM

## 2024-07-10 RX ORDER — ENOXAPARIN SODIUM 100 MG/ML
40 INJECTION SUBCUTANEOUS EVERY 12 HOURS SCHEDULED
Status: DISCONTINUED | OUTPATIENT
Start: 2024-07-10 | End: 2024-07-10 | Stop reason: DRUGHIGH

## 2024-07-10 SDOH — SOCIAL STABILITY: SOCIAL INSECURITY: HAVE YOU HAD THOUGHTS OF HARMING ANYONE ELSE?: NO

## 2024-07-10 SDOH — SOCIAL STABILITY: SOCIAL INSECURITY: WERE YOU ABLE TO COMPLETE ALL THE BEHAVIORAL HEALTH SCREENINGS?: YES

## 2024-07-10 ASSESSMENT — PAIN SCALES - GENERAL
PAINLEVEL_OUTOF10: 0 - NO PAIN
PAINLEVEL_OUTOF10: 6
PAINLEVEL_OUTOF10: 0 - NO PAIN

## 2024-07-10 ASSESSMENT — ENCOUNTER SYMPTOMS
SHORTNESS OF BREATH: 1
FEVER: 0
MYALGIAS: 0
WHEEZING: 0
ABDOMINAL DISTENTION: 0
BRUISES/BLEEDS EASILY: 0
NAUSEA: 0
BLOOD IN STOOL: 0
SLEEP DISTURBANCE: 0
SINUS PAIN: 0
DIZZINESS: 0
HEADACHES: 0
EYE PAIN: 0
VOICE CHANGE: 0
BACK PAIN: 1
NECK STIFFNESS: 0
APNEA: 0
WOUND: 0
TREMORS: 0
COLOR CHANGE: 0
HALLUCINATIONS: 0
EYE DISCHARGE: 0
NUMBNESS: 0
CHILLS: 0
CONFUSION: 0
DIARRHEA: 0
DIFFICULTY URINATING: 0
DYSPHORIC MOOD: 0
POLYDIPSIA: 0
NECK PAIN: 0
FATIGUE: 0
ARTHRALGIAS: 0
HEMATURIA: 0
POLYPHAGIA: 0
FREQUENCY: 0
SPEECH DIFFICULTY: 0
CHEST TIGHTNESS: 0
PALPITATIONS: 1
ABDOMINAL PAIN: 0
PHOTOPHOBIA: 0
SORE THROAT: 0
CONSTIPATION: 0
DYSURIA: 0
ADENOPATHY: 0
TROUBLE SWALLOWING: 0
VOMITING: 0
WEAKNESS: 0
FLANK PAIN: 0
APPETITE CHANGE: 0
LIGHT-HEADEDNESS: 0
UNEXPECTED WEIGHT CHANGE: 0
CHOKING: 0
EYE ITCHING: 0
COUGH: 0
NERVOUS/ANXIOUS: 0
SEIZURES: 0

## 2024-07-10 ASSESSMENT — ACTIVITIES OF DAILY LIVING (ADL)
DRESSING YOURSELF: INDEPENDENT
WALKS IN HOME: INDEPENDENT
LACK_OF_TRANSPORTATION: NO
JUDGMENT_ADEQUATE_SAFELY_COMPLETE_DAILY_ACTIVITIES: YES
PATIENT'S MEMORY ADEQUATE TO SAFELY COMPLETE DAILY ACTIVITIES?: YES
TOILETING: INDEPENDENT
HEARING - RIGHT EAR: FUNCTIONAL
LACK_OF_TRANSPORTATION: NO
ADEQUATE_TO_COMPLETE_ADL: YES
FEEDING YOURSELF: INDEPENDENT
BATHING: INDEPENDENT
GROOMING: INDEPENDENT
HEARING - LEFT EAR: FUNCTIONAL

## 2024-07-10 ASSESSMENT — COGNITIVE AND FUNCTIONAL STATUS - GENERAL
MOBILITY SCORE: 22
HELP NEEDED FOR BATHING: A LITTLE
DAILY ACTIVITIY SCORE: 24
PATIENT BASELINE BEDBOUND: NO
WALKING IN HOSPITAL ROOM: A LITTLE
DAILY ACTIVITIY SCORE: 23
PATIENT BASELINE BEDBOUND: NO
CLIMB 3 TO 5 STEPS WITH RAILING: A LITTLE
MOBILITY SCORE: 24

## 2024-07-10 ASSESSMENT — PAIN - FUNCTIONAL ASSESSMENT
PAIN_FUNCTIONAL_ASSESSMENT: 0-10
PAIN_FUNCTIONAL_ASSESSMENT: 0-10

## 2024-07-10 ASSESSMENT — LIFESTYLE VARIABLES
SUBSTANCE_ABUSE_PAST_12_MONTHS: NO
HAVE YOU EVER FELT YOU SHOULD CUT DOWN ON YOUR DRINKING: NO
HOW MANY STANDARD DRINKS CONTAINING ALCOHOL DO YOU HAVE ON A TYPICAL DAY: PATIENT DOES NOT DRINK
HOW OFTEN DO YOU HAVE 6 OR MORE DRINKS ON ONE OCCASION: NEVER
TOTAL SCORE: 0
SKIP TO QUESTIONS 9-10: 1
HAVE PEOPLE ANNOYED YOU BY CRITICIZING YOUR DRINKING: NO
PRESCIPTION_ABUSE_PAST_12_MONTHS: NO
AUDIT-C TOTAL SCORE: 0
HOW OFTEN DO YOU HAVE A DRINK CONTAINING ALCOHOL: NEVER
EVER HAD A DRINK FIRST THING IN THE MORNING TO STEADY YOUR NERVES TO GET RID OF A HANGOVER: NO
AUDIT-C TOTAL SCORE: 0
EVER FELT BAD OR GUILTY ABOUT YOUR DRINKING: NO

## 2024-07-10 ASSESSMENT — HEART SCORE
HISTORY: HIGHLY SUSPICIOUS
RISK FACTORS: 1-2 RISK FACTORS
ECG: NORMAL
AGE: 65+
TROPONIN: LESS THAN OR EQUAL TO NORMAL LIMIT
HEART SCORE: 5

## 2024-07-10 ASSESSMENT — PATIENT HEALTH QUESTIONNAIRE - PHQ9
1. LITTLE INTEREST OR PLEASURE IN DOING THINGS: NOT AT ALL
2. FEELING DOWN, DEPRESSED OR HOPELESS: NOT AT ALL
SUM OF ALL RESPONSES TO PHQ9 QUESTIONS 1 & 2: 0

## 2024-07-10 ASSESSMENT — PAIN DESCRIPTION - DESCRIPTORS: DESCRIPTORS: ACHING

## 2024-07-10 NOTE — PROGRESS NOTES
Janelle Jung is a 83 y.o. female admitted for Chest pain. Pharmacy reviewed the patient's [id number] medications and allergies for accuracy.    The list below reflects the PTA list prior to pharmacy medication history. A summary a changes to the PTA medication list has been listed below. Please review each medication in order reconciliation for additional clarification and justification.    Source of information:  [facility]    Medications added:  Alendronate 70mg once a week on Mondays  Ondansetron 4mg tid prn- pt hasn't started yet     Medications modified:  Vitamin D3 50mcg --> every day   Omeprazole 40mg --> every day   Rosuvastatin 20mg 2t every day --> 40mg every day   Medications to be removed:  Apap 500mg  Imodium 2mg  Methocarbamol 500mg   Nortriptyline 10mg  Triamcinolone 0.1%  Medications of concern:      Prior to Admission Medications   Prescriptions Last Dose Informant Patient Reported? Taking?   Bystolic 10 mg tablet   Yes No   Sig: Take 1 tablet (10 mg) by mouth once daily.   DULoxetine (Cymbalta) 30 mg DR capsule   No No   Sig: Take 1 capsule (30 mg) by mouth once daily. Do not crush or chew.   HYDROcodone-acetaminophen (Norco) 5-325 mg tablet   No No   Sig: Take 1 tablet by mouth 2 times a day as needed for severe pain (7 - 10).   acetaminophen (Tylenol) 500 mg tablet   Yes No   Sig: Take by mouth.   amLODIPine (Norvasc) 10 mg tablet   Yes No   Sig: Take 1 tablet (10 mg) by mouth once daily.   biotin 1 mg tablet   Yes No   Sig: Take 1 tablet (1 mg) by mouth once daily.   cholecalciferol (Vitamin D-3) 50 mcg (2,000 unit) capsule   Yes No   Sig: Take by mouth.   loperamide (Imodium A-D) 2 mg tablet   Yes No   Sig: Take 1 tablet (2 mg) by mouth every 4 hours if needed.   methocarbamol (Robaxin) 500 mg tablet   No No   Sig: Take 0.5 tablets (250 mg) by mouth 2 times a day as needed for muscle spasms.   naloxone (Narcan) 4 mg/0.1 mL nasal spray   Yes No   Sig: Administer into affected  nostril(s).   nortriptyline (Pamelor) 10 mg capsule   Yes No   Sig: Take 1 capsule (10 mg) by mouth once daily.   omeprazole (PriLOSEC) 40 mg DR capsule   Yes No   Sig: Take 1 capsule (40 mg) by mouth.   rosuvastatin (Crestor) 20 mg tablet   Yes No   Sig: Take 2 tablets (40 mg) by mouth once daily.   spironolactone (Aldactone) 25 mg tablet   Yes No   Sig: Take 1 tablet (25 mg) by mouth once daily.   triamcinolone (Kenalog) 0.1 % ointment   Yes No   Sig: Apply topically 3 times a day.      Facility-Administered Medications: None       Meme Patino

## 2024-07-10 NOTE — ED PROCEDURE NOTE
Procedure    Performed by: Cyrus Chou MD  Authorized by: Cyrus Chou MD  Cardiac Indications: palpitations                Procedure: Cardiac Ultrasound    Findings:   Views: parasternal long, parasternal short, apical four and subxiphoid  Effusion: The pericardial space was visualized and was positive for a PERICARDIAL EFFUSION.  Activity: Ventricular contractions were visualized.  LV: LV systolic function was NORMAL.  RV: RV size was NORMAL.    Impression:  Cardiac: The focused cardiac ultrasound exam had ABNORMAL findings as specified.                   Cyrus Chou MD  07/10/24 1521

## 2024-07-10 NOTE — H&P
History Obtained From: patient    History Of Present Illness:  Janelle Jung is a 83 y.o. female with PMHx s/f hypertension,dyslipidemia, osteopenia, back pain presenting with palpitation and chest pressure.  The patient has been dealing with increased back pain is taking opiate pain medication twice a day but still has uncontrollable pain.  She has issues in her spine from her neck on down patient has been under a lot of stress related to the pain.  She is felt palpitations and pressure in her chest blood pressure has been quite elevated at times.  Patient has been experiencing those palpitations did see her primary care provider yesterday she presented to the hospital today due to persistence and worsening of her symptoms.  She is not experiencing any fevers or chills urinary symptoms diarrhea no hemoptysis no purulent sputum.  Chest discomfort is a pressure in the mid part of her chest and it is nonradiating in nature no definitive alleviating or aggravating factors no diaphoresis associated with her discomfort.  Eitel signs on presentation emergency department showed temperature 96.8 heart rate 81 respiratory rate 20 blood pressure 152/73.  Blood work showed chemistry panel with sodium 137 potassium 4.0 chloride 108 CO2 20 BN peptide 673 troponin 6 repeat troponin 7 but BN peptide was elevated at 673 CBC within normal limits.  Rest x-ray appears to show either cardiomegaly or small cardial effusion, ED provider performed bedside ultrasound and noted that patient did have pericardial effusion LV function was noted to be normal.  EKG shows sinus rhythm with PVCs.  Patient is to be admitted to medical floor for further evaluation of her chest discomfort apparent pericardial effusion.      ED Course:  Diagnoses as of 07/10/24 1623   Palpitations   Pericardial effusion ([facility])     Relevant Results  Results for orders placed or performed during the hospital encounter of 07/10/24 (from the past 24 hour(s))    Protime-INR   Result Value Ref Range    Protime 11.8 9.8 - 12.8 seconds    INR 1.0 0.9 - 1.1   B-Type Natriuretic Peptide   Result Value Ref Range     (H) 0 - 99 pg/mL   Magnesium   Result Value Ref Range    Magnesium 2.10 1.60 - 2.40 mg/dL   CBC and Auto Differential   Result Value Ref Range    WBC 9.6 4.4 - 11.3 x10*3/uL    nRBC 0.0 0.0 - 0.0 /100 WBCs    RBC 4.49 4.00 - 5.20 x10*6/uL    Hemoglobin 13.0 12.0 - 16.0 g/dL    Hematocrit 39.3 36.0 - 46.0 %    MCV 88 80 - 100 fL    MCH 29.0 26.0 - 34.0 pg    MCHC 33.1 32.0 - 36.0 g/dL    RDW 13.5 11.5 - 14.5 %    Platelets 286 150 - 450 x10*3/uL    Neutrophils % 63.1 40.0 - 80.0 %    Immature Granulocytes %, Automated 0.4 0.0 - 0.9 %    Lymphocytes % 21.8 13.0 - 44.0 %    Monocytes % 12.5 2.0 - 10.0 %    Eosinophils % 1.4 0.0 - 6.0 %    Basophils % 0.8 0.0 - 2.0 %    Neutrophils Absolute 6.07 (H) 1.60 - 5.50 x10*3/uL    Immature Granulocytes Absolute, Automated 0.04 0.00 - 0.50 x10*3/uL    Lymphocytes Absolute 2.10 0.80 - 3.00 x10*3/uL    Monocytes Absolute 1.20 (H) 0.05 - 0.80 x10*3/uL    Eosinophils Absolute 0.13 0.00 - 0.40 x10*3/uL    Basophils Absolute 0.08 0.00 - 0.10 x10*3/uL   Comprehensive Metabolic Panel   Result Value Ref Range    Glucose 85 74 - 99 mg/dL    Sodium 137 136 - 145 mmol/L    Potassium 4.0 3.5 - 5.3 mmol/L    Chloride 108 (H) 98 - 107 mmol/L    Bicarbonate 20 (L) 21 - 32 mmol/L    Anion Gap 13 10 - 20 mmol/L    Urea Nitrogen 15 6 - 23 mg/dL    Creatinine 0.76 0.50 - 1.05 mg/dL    eGFR 77 >60 mL/min/1.73m*2    Calcium 9.3 8.6 - 10.3 mg/dL    Albumin 4.1 3.4 - 5.0 g/dL    Alkaline Phosphatase 61 33 - 136 U/L    Total Protein 6.5 6.4 - 8.2 g/dL    AST 15 9 - 39 U/L    Bilirubin, Total 0.6 0.0 - 1.2 mg/dL    ALT 8 7 - 45 U/L   Troponin I, High Sensitivity, Initial   Result Value Ref Range    Troponin I, High Sensitivity 6 0 - 13 ng/L   Sedimentation rate, automated   Result Value Ref Range    Sedimentation Rate 3 0 - 30 mm/h   Troponin,  High Sensitivity, 1 Hour   Result Value Ref Range    Troponin I, High Sensitivity 7 0 - 13 ng/L      Point of Care Ultrasound    Result Date: 7/10/2024  Cyrus Chou MD     7/10/2024  3:27 PM Performed by: Cyrus Chou MD Authorized by: Cyrus Chou MD  Cardiac Indications: palpitations Procedure: Cardiac Ultrasound Findings:  Views: parasternal long, parasternal short, apical four and subxiphoid Effusion: The pericardial space was visualized and was positive for a PERICARDIAL EFFUSION. Activity: Ventricular contractions were visualized. LV: LV systolic function was NORMAL. RV: RV size was NORMAL. Impression: Cardiac: The focused cardiac ultrasound exam had ABNORMAL findings as specified.     XR chest 1 view    Result Date: 7/10/2024  Interpreted By:  Luz Rivera, STUDY: Chest, single AP view.   INDICATION: Signs/Symptoms:CP.   COMPARISON: 02/23/2009   ACCESSION NUMBER(S): [id number]   ORDERING CLINICIAN: JIM CROSS   FINDINGS: Mildly enlarged cardiac silhouette size. Atherosclerosis of the thoracic aorta. Spinal stimulator leads visualized projecting over the midthoracic region. No focal consolidation, edema, or pneumothorax. No sizeable pleural effusion. No acute osseous abnormality.       1. Mild cardiomegaly and/or pericardial effusion. 2. No focal pulmonary consolidation   MACRO: None.   Signed by: Luz Rivera 7/10/2024 2:57 PM Dictation workstation:   [id number]    MR lumbar spine wo IV contrast    Result Date: 6/24/2024  Interpreted By:  Jerrod Brooks, STUDY: MR LUMBAR SPINE WO IV CONTRAST;  6/22/2024 2:19 pm   INDICATION: Signs/Symptoms:worsening lumbar radicular symptoms with urinary symptoms.   COMPARISON: 09/09/2022.   ACCESSION NUMBER(S): [id number]   ORDERING CLINICIAN: TRACY MUSE   TECHNIQUE: Sagittal T1, T2, STIR, axial T1 and T2 weighted images of the lumbar spine were acquired.   FINDINGS: Evaluation is at least minimally limited due to lack of  intravenous contrast.   Moderate levoconvex scoliosis with apex at L2-3. Grade 1 retrolisthesis L2 over L3, stable. Posterior spinal fusion with right-sided pedicular screws and rods from L3 through S1 again noted. Postsurgical changes status post laminectomy L4 and L5 with persistent fluid collection in the laminectomy bed measuring up to 2.1 cm craniocaudal by 0.7 cm anterior-posterior by 1.5 cm transverse without significant change possibly due to seroma or pseudomeningocele with evaluation limited for detection of infectious inflammatory change due to lack of intravenous contrast.   T12-L1:  There is no significant central canal or neural foraminal stenosis.   L1-2:  Posterior endplate osteophytes minimally indent the ventral thecal sac and minimally narrow the neuroforamina.   L2-3:  Disc bulge/pseudobulge and spondylolisthesis with small posterior endplate osteophytes minimally indents the ventral thecal sac and minimally narrows the neuroforamina   L3-4:  There is no significant central canal or right neural foraminal stenosis. Hypertrophic facet changes moderately narrow the left neuroforamen   L4-5: Hypertrophic facet changes minimally narrow the left subarticular zone and minimally narrow the left greater than right neuroforamen   L5-S1:  Tiny central disc osteophyte complex minimally indents the ventral thecal sac. Neuroforamina are not significantly narrowed.         Stable examination compared with 09/09/2022 with degenerative and postoperative changes as above described without high-grade central canal or foraminal stenosis identified. Small fluid collection in the laminectomy bed at L4/L5 stable since the prior examination perhaps due to seroma or pseudomeningocele for instance.   Signed by: Jerrod Brooks 6/24/2024 12:37 PM Dictation workstation:   [id number]     Scheduled medications:  [START ON 7/11/2024] aspirin, 81 mg, oral, Daily      Continuous medications:     PRN medications:        Past Medical  History  She has a past medical history of Essential (primary) hypertension.  Chronic back pain osteoporosis scoliosis dyslipidemia    Surgical History  Left total hip arthroplasty right total hip arthroplasty cholecystectomy laminectomy sinus surgery     Social History  She reports that she has quit smoking. Her smoking use included cigarettes. She has never used smokeless tobacco. No history on file for alcohol use and drug use.    Family History  No family history on file.     Allergies  Adhesive tape-silicones, Iodinated contrast media, Iodine, Latex, Metronidazole, Niacin, Sulfamethoxazole, and Sulfites    Code Status  No Order     Review of Systems   Constitutional:  Negative for appetite change, chills, fatigue, fever and unexpected weight change.   HENT:  Negative for congestion, ear discharge, ear pain, mouth sores, nosebleeds, sinus pain, sore throat, trouble swallowing and voice change.    Eyes:  Negative for photophobia, pain, discharge, itching and visual disturbance.   Respiratory:  Positive for shortness of breath. Negative for apnea, cough, choking, chest tightness and wheezing.    Cardiovascular:  Positive for chest pain and palpitations. Negative for leg swelling.   Gastrointestinal:  Negative for abdominal distention, abdominal pain, blood in stool, constipation, diarrhea, nausea and vomiting.   Endocrine: Negative for cold intolerance, heat intolerance, polydipsia, polyphagia and polyuria.   Genitourinary:  Negative for decreased urine volume, difficulty urinating, dysuria, flank pain, frequency, hematuria and urgency.   Musculoskeletal:  Positive for back pain. Negative for arthralgias, gait problem, myalgias, neck pain and neck stiffness.   Skin:  Negative for color change, pallor and wound.   Allergic/Immunologic: Negative for food allergies and immunocompromised state.   Neurological:  Negative for dizziness, tremors, seizures, syncope, speech difficulty, weakness, light-headedness, numbness  and headaches.   Hematological:  Negative for adenopathy. Does not bruise/bleed easily.   Psychiatric/Behavioral:  Negative for confusion, dysphoric mood, hallucinations, sleep disturbance and suicidal ideas. The patient is not nervous/anxious.        Last Recorded Vitals  /58   Pulse 64   Temp 37 °C (98.6 °F)   Resp 13   Wt 74.4 kg (164 lb)   SpO2 99%      Physical Exam  Vitals reviewed.   Constitutional:       General: She is not in acute distress.  HENT:      Head: Normocephalic and atraumatic.      Right Ear: External ear normal.      Left Ear: External ear normal.      Nose: Nose normal.      Mouth/Throat:      Mouth: Mucous membranes are moist.      Pharynx: Oropharynx is clear.   Eyes:      General: No scleral icterus.     Extraocular Movements: Extraocular movements intact.      Conjunctiva/sclera: Conjunctivae normal.      Pupils: Pupils are equal, round, and reactive to light.   Cardiovascular:      Rate and Rhythm: Normal rate and regular rhythm.      Pulses: Normal pulses.      Heart sounds: Normal heart sounds. No murmur heard.  Pulmonary:      Effort: Pulmonary effort is normal. No respiratory distress.      Breath sounds: Normal breath sounds. No wheezing, rhonchi or rales.   Chest:      Chest wall: No tenderness.   Abdominal:      General: Bowel sounds are normal. There is no distension.      Palpations: Abdomen is soft. There is no mass.      Tenderness: There is no abdominal tenderness. There is no rebound.   Musculoskeletal:         General: No swelling or deformity. Normal range of motion.      Cervical back: Normal range of motion.      Right lower leg: No edema.      Left lower leg: Edema present.   Skin:     General: Skin is warm and dry.      Capillary Refill: Capillary refill takes less than 2 seconds.   Neurological:      General: No focal deficit present.      Mental Status: She is alert and oriented to person, place, and time.   Psychiatric:         Mood and Affect: Mood  normal.         Behavior: Behavior normal.         Assessment/Plan   Principal Problem:    Chest pain  Chest pain, paracardial effusion elevated BN peptide  Patient admitted will continue on low-dose aspirin monitor on telemetry continue her statin dose  Appears to have PVCs on telemetry is already on beta-blocker  Will request cardiology consultation and echocardiogram    Hypertension  Continue patient's home medications    Chronic back pain  The patient's routine home medications are continued    Dyslipidemia  Patient is continued on her home dose of rosuvastatin      No new Assessment & Plan notes have been filed under this hospital service since the last note was generated.  Service: Internal Medicine          Bipin Jhaveri, APRN-CNP    Dragon dictation software was used to dictate this note and thus there may be minor errors in translation/transcription including garbled speech or misspellings. Please contact for clarification if needed.

## 2024-07-10 NOTE — ED PROVIDER NOTES
Chief Complaint   Patient presents with    palpitations/ htn this am       83-year-old female arrives to the emergency department with a chief complaint of severe episode of palpitations and feeling of overall impending doom/misery.  Patient states that for the last 2 months she has been having intermittent palpitations, patient has follow-up with her primary care provider and had baseline laboratory studies done yesterday.  Patient states that this morning approximately 0 530 she began having the worst case of palpitation she has had, with an overall feeling of unwell.  The patient called 911, EMS came and did an EKG and establish an IV line, the patient states that she felt better and asked them to remove the IV line and went back to sleep.  Upon patient waking later in the morning, patient continued to overall not feel well, and had an even worse episode of palpitations, the  states that she was white as a ghost in his word, sweaty, and more miserable than he had seen her in 60 years.  They again called 911 and the patient was brought to the ER.  Upon initial assessment the patient states that she feels better than she has today, however still endorses a feeling of unwell and a heaviness midsternal that is nonradiating.  Patient also states that she has had increased anxiety secondary to her chronic pain from her arthritis and osteopenia specifically in her spine, patient has been working on getting established with pain management.  Patient has no shortness of breath or respiratory distress upon initial assessment and denies any pain.      History provided by:  Patient   used: No         PmHx, PsHx, Allergies, Family Hx, social Hx reviewed as documented    A complete 10 point review of systems was performed and is negative except for as mentioned in the HPI.    Physical Exam:    General: Patient is AAOx3, appears well developed, well nourished, is a good historian, answers questions  appropriately    HEENT: head normocephalic, atraumatic, PERRLA, EOMs intact, oropharynx without erythema or exudate, buccal mucosa intact without lesions, TMs unremarkable, nose is patent bilateral    Neck: supple, full ROM, negative for lymphadenopathy, JVD, thyromegaly, tracheal deviation, nuccal rigidity    Pulmonary: CTAB, no accessory muscle use, able to speak full clear sentences    Cardiac: HRRR, no murmurs, rubs or gallops    GI: soft, non-tender, non-distended, BS + x 4, no masses or organomegaly, no guarding or CVA tenderness noted, negative malone's, mcburney's    Musculoskeletal: Multiple chronic somatic complaints mostly revolving around spine, full weight bearing with assistance, KHAN, no joint effusions, clubbing or edema noted    Skin: intact, no lesions or rashes noted, turgor is good.    Neuro: patient follow commands, cranial nerves 2-12 grossly intact, motor strengths 5/5 upper and lower extremities, DTR's and sensation are symmetrical. No focal deficits.    Rectal/: No urinary burning, urgency, change in frequency.  Patient has no rectal complaints        Medical Decision Making  This patient was seen, treated, and evaluated in conjunction with Dr. Cyrus Chou    Considerations for this patient are acute coronary syndrome including STEMI & NSTEMI, malignant dysrhythmia, hypertension, pericardial tamponade, pneumothorax, pulmonary embolism, aortic dissection, or pericardial effusion.  Other consideration for the patient given her history with her chronic back pain increasing would be stress and anxiety contributing to her symptoms.    EKG, diagnostic blood work, chest x-ray will be used to further evaluate    The patient's EKG was done on 7/10 at 1315, the EKG was interpreted by the attending physician as no STEMI, the EKG is interpreted by me shows a sinus rhythm with a rate of 69, SD interval of 176 and a QTc of 454, there are multiple PVCs appreciated, similar to the 5-lead in the  room, no other acute ST abnormalities appreciated.    The patient's diagnostic blood work shows elevated BNP at 673, there are no comparative studies for this value.  Otherwise diagnostic blood work showing no acute abnormality.  The chest x-ray is negative for any acute abnormality    The patient to be further evaluated and dispositioned by the above attending physician                The patient has had the following imaging during this ER visit: XR CHEST 1 VIEW  POINT OF CARE ULTRASOUND     Patient History   Past Medical History:   Diagnosis Date    Essential (primary) hypertension     Hypertension, well controlled     No past surgical history on file.  No family history on file.  Social History     Tobacco Use    Smoking status: Former     Types: Cigarettes    Smokeless tobacco: Never   Substance Use Topics    Alcohol use: Not on file    Drug use: Not on file       ED Triage Vitals [07/10/24 1251]   Temperature Heart Rate Respirations BP   36 °C (96.8 °F) 81 20 152/73      Pulse Ox Temp Source Heart Rate Source Patient Position   97 % Temporal Monitor Sitting      BP Location FiO2 (%)     Left arm --       Vitals:    07/10/24 1327 07/10/24 1406 07/10/24 1437 07/10/24 1447   BP: (!) 173/107 (!) 173/107 146/75 134/70   BP Location:       Patient Position:       Pulse: 64 63 62 88   Resp: 14 20 18 20   Temp:    37 °C (98.6 °F)   TempSrc:       SpO2: 98% 98% 98% 98%   Weight:       Height:                   ENRRIQUE Arrieta-PHOENIX  07/10/24 1522

## 2024-07-11 ENCOUNTER — APPOINTMENT (OUTPATIENT)
Dept: RADIOLOGY | Facility: HOSPITAL | Age: 84
End: 2024-07-11
Payer: MEDICARE

## 2024-07-11 ENCOUNTER — APPOINTMENT (OUTPATIENT)
Dept: CARDIOLOGY | Facility: HOSPITAL | Age: 84
End: 2024-07-11
Payer: MEDICARE

## 2024-07-11 VITALS
BODY MASS INDEX: 30.38 KG/M2 | DIASTOLIC BLOOD PRESSURE: 74 MMHG | WEIGHT: 165.1 LBS | OXYGEN SATURATION: 93 % | HEART RATE: 68 BPM | RESPIRATION RATE: 16 BRPM | TEMPERATURE: 98.1 F | HEIGHT: 62 IN | SYSTOLIC BLOOD PRESSURE: 133 MMHG

## 2024-07-11 LAB
ANION GAP SERPL CALC-SCNC: 11 MMOL/L (ref 10–20)
AORTIC VALVE MEAN GRADIENT: 6 MMHG
AORTIC VALVE PEAK VELOCITY: 1.58 M/S
AV PEAK GRADIENT: 10 MMHG
AVA (PEAK VEL): 1.91 CM2
AVA (VTI): 2.51 CM2
BUN SERPL-MCNC: 15 MG/DL (ref 6–23)
CALCIUM SERPL-MCNC: 8.9 MG/DL (ref 8.6–10.3)
CHLORIDE SERPL-SCNC: 107 MMOL/L (ref 98–107)
CO2 SERPL-SCNC: 25 MMOL/L (ref 21–32)
CREAT SERPL-MCNC: 0.73 MG/DL (ref 0.5–1.05)
EGFRCR SERPLBLD CKD-EPI 2021: 81 ML/MIN/1.73M*2
EJECTION FRACTION APICAL 4 CHAMBER: 67
EJECTION FRACTION: 63 %
ERYTHROCYTE [DISTWIDTH] IN BLOOD BY AUTOMATED COUNT: 13.6 % (ref 11.5–14.5)
GLUCOSE SERPL-MCNC: 91 MG/DL (ref 74–99)
HCT VFR BLD AUTO: 38.2 % (ref 36–46)
HGB BLD-MCNC: 12.4 G/DL (ref 12–16)
LEFT ATRIUM VOLUME AREA LENGTH INDEX BSA: 25.3 ML/M2
LEFT VENTRICLE INTERNAL DIMENSION DIASTOLE: 5.6 CM (ref 3.5–6)
LEFT VENTRICULAR OUTFLOW TRACT DIAMETER: 2 CM
LV EJECTION FRACTION BIPLANE: 62 %
MCH RBC QN AUTO: 28.7 PG (ref 26–34)
MCHC RBC AUTO-ENTMCNC: 32.5 G/DL (ref 32–36)
MCV RBC AUTO: 88 FL (ref 80–100)
MITRAL VALVE E/A RATIO: 0.87
NRBC BLD-RTO: 0 /100 WBCS (ref 0–0)
PLATELET # BLD AUTO: 304 X10*3/UL (ref 150–450)
POTASSIUM SERPL-SCNC: 4.2 MMOL/L (ref 3.5–5.3)
RBC # BLD AUTO: 4.32 X10*6/UL (ref 4–5.2)
RIGHT VENTRICLE FREE WALL PEAK S': 14.4 CM/S
RIGHT VENTRICLE PEAK SYSTOLIC PRESSURE: 31.3 MMHG
SODIUM SERPL-SCNC: 139 MMOL/L (ref 136–145)
TRICUSPID ANNULAR PLANE SYSTOLIC EXCURSION: 2 CM
TSH SERPL-ACNC: 0.68 MIU/L (ref 0.44–3.98)
WBC # BLD AUTO: 8.5 X10*3/UL (ref 4.4–11.3)

## 2024-07-11 PROCEDURE — 93018 CV STRESS TEST I&R ONLY: CPT | Performed by: INTERNAL MEDICINE

## 2024-07-11 PROCEDURE — 2500000002 HC RX 250 W HCPCS SELF ADMINISTERED DRUGS (ALT 637 FOR MEDICARE OP, ALT 636 FOR OP/ED): Performed by: NURSE PRACTITIONER

## 2024-07-11 PROCEDURE — 2500000005 HC RX 250 GENERAL PHARMACY W/O HCPCS: Performed by: NURSE PRACTITIONER

## 2024-07-11 PROCEDURE — G0378 HOSPITAL OBSERVATION PER HR: HCPCS

## 2024-07-11 PROCEDURE — 99239 HOSP IP/OBS DSCHRG MGMT >30: CPT | Performed by: INTERNAL MEDICINE

## 2024-07-11 PROCEDURE — 99222 1ST HOSP IP/OBS MODERATE 55: CPT | Performed by: INTERNAL MEDICINE

## 2024-07-11 PROCEDURE — 93017 CV STRESS TEST TRACING ONLY: CPT

## 2024-07-11 PROCEDURE — 2500000001 HC RX 250 WO HCPCS SELF ADMINISTERED DRUGS (ALT 637 FOR MEDICARE OP): Performed by: INTERNAL MEDICINE

## 2024-07-11 PROCEDURE — 2500000004 HC RX 250 GENERAL PHARMACY W/ HCPCS (ALT 636 FOR OP/ED): Performed by: INTERNAL MEDICINE

## 2024-07-11 PROCEDURE — 84443 ASSAY THYROID STIM HORMONE: CPT | Performed by: INTERNAL MEDICINE

## 2024-07-11 PROCEDURE — 82374 ASSAY BLOOD CARBON DIOXIDE: CPT | Performed by: NURSE PRACTITIONER

## 2024-07-11 PROCEDURE — 3430000001 HC RX 343 DIAGNOSTIC RADIOPHARMACEUTICALS: Performed by: INTERNAL MEDICINE

## 2024-07-11 PROCEDURE — 78452 HT MUSCLE IMAGE SPECT MULT: CPT

## 2024-07-11 PROCEDURE — 93306 TTE W/DOPPLER COMPLETE: CPT | Performed by: INTERNAL MEDICINE

## 2024-07-11 PROCEDURE — A9502 TC99M TETROFOSMIN: HCPCS | Performed by: INTERNAL MEDICINE

## 2024-07-11 PROCEDURE — 36415 COLL VENOUS BLD VENIPUNCTURE: CPT | Performed by: NURSE PRACTITIONER

## 2024-07-11 PROCEDURE — 36415 COLL VENOUS BLD VENIPUNCTURE: CPT | Performed by: INTERNAL MEDICINE

## 2024-07-11 PROCEDURE — 2500000004 HC RX 250 GENERAL PHARMACY W/ HCPCS (ALT 636 FOR OP/ED): Performed by: NURSE PRACTITIONER

## 2024-07-11 PROCEDURE — 2500000001 HC RX 250 WO HCPCS SELF ADMINISTERED DRUGS (ALT 637 FOR MEDICARE OP): Performed by: NURSE PRACTITIONER

## 2024-07-11 PROCEDURE — 85027 COMPLETE CBC AUTOMATED: CPT | Performed by: NURSE PRACTITIONER

## 2024-07-11 PROCEDURE — 93016 CV STRESS TEST SUPVJ ONLY: CPT | Performed by: INTERNAL MEDICINE

## 2024-07-11 PROCEDURE — 93306 TTE W/DOPPLER COMPLETE: CPT

## 2024-07-11 RX ORDER — LOPERAMIDE HYDROCHLORIDE 2 MG/1
2 CAPSULE ORAL 4 TIMES DAILY PRN
Status: DISCONTINUED | OUTPATIENT
Start: 2024-07-11 | End: 2024-07-11 | Stop reason: HOSPADM

## 2024-07-11 RX ORDER — REGADENOSON 0.08 MG/ML
0.4 INJECTION, SOLUTION INTRAVENOUS
Status: COMPLETED | OUTPATIENT
Start: 2024-07-11 | End: 2024-07-11

## 2024-07-11 RX ORDER — AMINOPHYLLINE 25 MG/ML
125 INJECTION, SOLUTION INTRAVENOUS AS NEEDED
Status: CANCELLED | OUTPATIENT
Start: 2024-07-11

## 2024-07-11 ASSESSMENT — COGNITIVE AND FUNCTIONAL STATUS - GENERAL
CLIMB 3 TO 5 STEPS WITH RAILING: A LITTLE
DAILY ACTIVITIY SCORE: 23
MOBILITY SCORE: 22
HELP NEEDED FOR BATHING: A LITTLE
WALKING IN HOSPITAL ROOM: A LITTLE

## 2024-07-11 ASSESSMENT — PAIN SCALES - GENERAL
PAINLEVEL_OUTOF10: 0 - NO PAIN
PAINLEVEL_OUTOF10: 8

## 2024-07-11 ASSESSMENT — PAIN DESCRIPTION - LOCATION: LOCATION: BACK

## 2024-07-11 ASSESSMENT — ACTIVITIES OF DAILY LIVING (ADL): LACK_OF_TRANSPORTATION: NO

## 2024-07-11 NOTE — CONSULTS
[facility]    Patient Name: Janelle Jung  Patient : 1940  Room/Bed: 2307/2307-A    Date: 24  Time: 8:59 AM    Referred by Dr. Buckner ref. provider found for palpitations/ htn this am     History Of Present Illness:    Janelle Jung is a 83 y.o. female who presented to the emergency department with chest pressure, palpitations, and generalized weakness.  Patient states that she has had symptoms for the last several weeks.  She states that her main symptom has been generalized weakness.  She states that she also has had ongoing chronic back pain.  Given her symptoms she decided to come to the emergency department for evaluation.  BMP showed a serum sodium of 139, serum potassium 4.2, serum creatinine 0.73.  Serum magnesium was 2.10.  CRP was less than 0.10.  BNP was elevated at 673.  Troponin was 6-7.  CBC showed a hemoglobin of 12.4.  Chest x-ray showed mild cardiomegaly without focal pulmonary consolidation.  Limited bedside echo done in the ED reviewed and appeared to show preserved LV function with fat pad and trivial pericardial effusion on limited and technically difficult images.  Full echocardiogram has been ordered.  ECG shows sinus rhythm with PVCs, possible inferior infarct age undetermined as well as a possible anterior infarct age undetermined.  Telemetry showing sinus rhythm with heart rate in the 60s and PVCs.  During my exam the patient was resting comfortably in bed.    Assessment/Plan:   1.  Chest pain  The patient is reporting chest pressure with associated generalized weakness.  ECG shows sinus rhythm with PVCs, possible inferior infarct age undetermined as well as a possible anterior infarct age undetermined.  Telemetry showing sinus rhythm with heart rate in the 60s and PVCs.   Troponin is negative x 2 at 6-7.  Will check echocardiogram and Lexiscan nuclear stress for additional evaluation.    2.  Palpitations/PVCs  Patient reporting some  palpitations with ECG showing sinus rhythm and PVCs.  PVCs also seen on telemetry monitoring.  Will check echocardiogram and nuclear stress as noted above.  Recommend keeping serum potassium greater than 4 and serum magnesium greater than 2.  Continue current dose of metoprolol succinate.    3.  Hypertension  The patient has a history of hypertension which appears controlled this morning.  Continue to monitor and adjust antihypertensive medical therapy as necessary.    4.  Dyslipidemia  Continue rosuvastatin therapy    5.  Chronic back pain  Management per hospitalist service    Past Medical History:  She has a past medical history of Essential (primary) hypertension.  Dyslipidemia, chronic back pain    Past Surgical History:  She has no past surgical history on file.  Bowel resection, knee surgery x 2, cholecystectomy      Social History:  She reports that she has quit smoking. Her smoking use included cigarettes. She has never used smokeless tobacco. No history on file for alcohol use and drug use.    Family History:  No family history on file.  Father with heart disease and CVA, mother with multiple sclerosis     Allergies:  Adhesive tape-silicones, Iodinated contrast media, Iodine, Latex, Metronidazole, Niacin, Sulfamethoxazole, and Sulfites    Outpatient Medications:  Current Outpatient Medications   Medication Instructions    alendronate (FOSAMAX) 70 mg, oral, Every 7 days, Take in the morning with a full glass of water, on an empty stomach, and do not take anything else by mouth or lie down for the next 30 min.    amLODIPine (Norvasc) 10 mg tablet 1 tablet, oral, Daily    biotin 1 mg tablet 1 tablet, oral, Daily    Bystolic 10 mg tablet 1 tablet, oral, Daily    cholecalciferol (Vitamin D-3) 50 mcg (2,000 unit) capsule oral    DULoxetine (CYMBALTA) 30 mg, oral, Daily, Do not crush or chew.    HYDROcodone-acetaminophen (Norco) 5-325 mg tablet 1 tablet, oral, 2 times daily PRN    methocarbamol (ROBAXIN) 250 mg,  "oral, 2 times daily PRN    naloxone (Narcan) 4 mg/0.1 mL nasal spray nasal    omeprazole (PRILOSEC) 40 mg, oral, Daily    ondansetron (ZOFRAN) 4 mg, oral, Every 8 hours PRN    rosuvastatin (CRESTOR) 40 mg, oral, Daily    spironolactone (Aldactone) 25 mg tablet 1 tablet, oral, Daily        ROS:  A 14 point review of systems was done and is negative other than as stated in HPI    Vitals:  Vitals:    07/10/24 2042 07/10/24 2128 07/11/24 0203 07/11/24 0544   BP: (!) 127/95 142/65 137/60 132/61   BP Location: Left arm  Left arm Left arm   Patient Position: Sitting  Sitting Lying   Pulse: 70 63 68 60   Resp: 18 18 18 18   Temp:  36.3 °C (97.3 °F) 35.9 °C (96.6 °F) 35.8 °C (96.4 °F)   TempSrc:  Temporal Temporal Temporal   SpO2: 96% 95% 98% 92%   Weight:  74.9 kg (165 lb 1.6 oz)     Height:  1.575 m (5' 2\")         Physical Exam:     Constitutional: Cooperative, in no acute distress, alert, appears stated age.  Skin: Skin color, texture, turgor normal. No rashes or lesions.  Head: Normocephalic. No masses, lesions, tenderness or abnormalities  Eyes: Extraocular movements are grossly intact.  Mouth and throat: Mucous membranes moist  Neck: Neck supple, no carotid bruits, no JVD  Respiratory: Lungs clear to auscultation, no wheezing or rhonchi, no use of accessory muscles  Chest wall: No scars, normal excursion with respiration  Cardiovascular: Regular rhythm without murmur, occasional ectopy  Gastrointestinal: Abdomen soft, nontender. Bowel sounds normal.  Musculoskeletal: Strength equal in upper extremities  Extremities: Trace pitting edema  Neurologic: Sensation grossly intact, alert and oriented ×3    Intake/Output:   No intake/output data recorded.    Outpatient Medications  No current facility-administered medications on file prior to encounter.     Current Outpatient Medications on File Prior to Encounter   Medication Sig Dispense Refill    alendronate (Fosamax) 70 mg tablet Take 1 tablet (70 mg) by mouth every 7 " days. Take in the morning with a full glass of water, on an empty stomach, and do not take anything else by mouth or lie down for the next 30 min.      amLODIPine (Norvasc) 10 mg tablet Take 1 tablet (10 mg) by mouth once daily.      biotin 1 mg tablet Take 1 tablet (1 mg) by mouth once daily.      Bystolic 10 mg tablet Take 1 tablet (10 mg) by mouth once daily.      cholecalciferol (Vitamin D-3) 50 mcg (2,000 unit) capsule Take by mouth.      DULoxetine (Cymbalta) 30 mg DR capsule Take 1 capsule (30 mg) by mouth once daily. Do not crush or chew. 30 capsule 2    HYDROcodone-acetaminophen (Norco) 5-325 mg tablet Take 1 tablet by mouth 2 times a day as needed for severe pain (7 - 10). 60 tablet 0    methocarbamol (Robaxin) 500 mg tablet Take 0.5 tablets (250 mg) by mouth 2 times a day as needed for muscle spasms. (Patient not taking: Reported on 7/10/2024) 30 tablet 2    naloxone (Narcan) 4 mg/0.1 mL nasal spray Administer into affected nostril(s).      omeprazole (PriLOSEC) 40 mg DR capsule Take 1 capsule (40 mg) by mouth once daily.      ondansetron (Zofran) 4 mg tablet Take 1 tablet (4 mg) by mouth every 8 hours if needed for nausea or vomiting.      rosuvastatin (Crestor) 40 mg tablet Take 1 tablet (40 mg) by mouth once daily.      spironolactone (Aldactone) 25 mg tablet Take 1 tablet (25 mg) by mouth once daily.      [DISCONTINUED] acetaminophen (Tylenol) 500 mg tablet Take by mouth.      [DISCONTINUED] loperamide (Imodium A-D) 2 mg tablet Take 1 tablet (2 mg) by mouth every 4 hours if needed.      [DISCONTINUED] nortriptyline (Pamelor) 10 mg capsule Take 1 capsule (10 mg) by mouth once daily.      [DISCONTINUED] triamcinolone (Kenalog) 0.1 % ointment Apply topically 3 times a day.         Scheduled medications  amLODIPine, 10 mg, oral, Daily  aspirin, 81 mg, oral, Daily  cholecalciferol, 2,000 Units, oral, Daily  DULoxetine, 30 mg, oral, Daily  enoxaparin, 40 mg, subcutaneous, q24h  metoprolol succinate XL, 100  mg, oral, Daily  nortriptyline, 10 mg, oral, Daily  pantoprazole, 40 mg, oral, Daily   Or  pantoprazole, 40 mg, intravenous, Daily  perflutren protein A microsphere, 0.5 mL, intravenous, Once in imaging  polyethylene glycol, 17 g, oral, Daily  rosuvastatin, 40 mg, oral, Nightly  spironolactone, 25 mg, oral, Daily      Continuous medications     PRN medications  PRN medications: HYDROcodone-acetaminophen, melatonin, methocarbamol, ondansetron ODT **OR** ondansetron   Medications Prior to Admission   Medication Sig Dispense Refill Last Dose    alendronate (Fosamax) 70 mg tablet Take 1 tablet (70 mg) by mouth every 7 days. Take in the morning with a full glass of water, on an empty stomach, and do not take anything else by mouth or lie down for the next 30 min.       amLODIPine (Norvasc) 10 mg tablet Take 1 tablet (10 mg) by mouth once daily.       biotin 1 mg tablet Take 1 tablet (1 mg) by mouth once daily.       Bystolic 10 mg tablet Take 1 tablet (10 mg) by mouth once daily.       cholecalciferol (Vitamin D-3) 50 mcg (2,000 unit) capsule Take by mouth.       DULoxetine (Cymbalta) 30 mg DR capsule Take 1 capsule (30 mg) by mouth once daily. Do not crush or chew. 30 capsule 2     HYDROcodone-acetaminophen (Norco) 5-325 mg tablet Take 1 tablet by mouth 2 times a day as needed for severe pain (7 - 10). 60 tablet 0     methocarbamol (Robaxin) 500 mg tablet Take 0.5 tablets (250 mg) by mouth 2 times a day as needed for muscle spasms. (Patient not taking: Reported on 7/10/2024) 30 tablet 2 Not Taking    naloxone (Narcan) 4 mg/0.1 mL nasal spray Administer into affected nostril(s).       omeprazole (PriLOSEC) 40 mg DR capsule Take 1 capsule (40 mg) by mouth once daily.       ondansetron (Zofran) 4 mg tablet Take 1 tablet (4 mg) by mouth every 8 hours if needed for nausea or vomiting.       rosuvastatin (Crestor) 40 mg tablet Take 1 tablet (40 mg) by mouth once daily.       spironolactone (Aldactone) 25 mg tablet Take 1  tablet (25 mg) by mouth once daily.          Recent Labs: (past 2 days)  Recent Results (from the past 48 hour(s))   Comprehensive Metabolic Panel    Collection Time: 07/09/24  3:14 PM   Result Value Ref Range    Glucose 91 74 - 99 mg/dL    Sodium 140 136 - 145 mmol/L    Potassium 4.4 3.5 - 5.3 mmol/L    Chloride 108 (H) 98 - 107 mmol/L    Bicarbonate 25 21 - 32 mmol/L    Anion Gap 11 10 - 20 mmol/L    Urea Nitrogen 17 6 - 23 mg/dL    Creatinine 0.91 0.50 - 1.05 mg/dL    eGFR 62 >60 mL/min/1.73m*2    Calcium 9.7 8.6 - 10.3 mg/dL    Albumin 4.2 3.4 - 5.0 g/dL    Alkaline Phosphatase 64 33 - 136 U/L    Total Protein 6.2 (L) 6.4 - 8.2 g/dL    AST 14 9 - 39 U/L    Bilirubin, Total 0.5 0.0 - 1.2 mg/dL    ALT 9 7 - 45 U/L   Vitamin B12    Collection Time: 07/09/24  3:14 PM   Result Value Ref Range    Vitamin B12 225 211 - 911 pg/mL   Thyroid Stimulating Hormone    Collection Time: 07/09/24  3:14 PM   Result Value Ref Range    Thyroid Stimulating Hormone 0.71 0.44 - 3.98 mIU/L   Magnesium    Collection Time: 07/09/24  3:14 PM   Result Value Ref Range    Magnesium 2.06 1.60 - 2.40 mg/dL   CBC and Auto Differential    Collection Time: 07/09/24  3:14 PM   Result Value Ref Range    WBC 11.3 4.4 - 11.3 x10*3/uL    nRBC 0.0 0.0 - 0.0 /100 WBCs    RBC 4.65 4.00 - 5.20 x10*6/uL    Hemoglobin 13.4 12.0 - 16.0 g/dL    Hematocrit 41.6 36.0 - 46.0 %    MCV 90 80 - 100 fL    MCH 28.8 26.0 - 34.0 pg    MCHC 32.2 32.0 - 36.0 g/dL    RDW 13.8 11.5 - 14.5 %    Platelets 358 150 - 450 x10*3/uL    Neutrophils % 60.5 40.0 - 80.0 %    Immature Granulocytes %, Automated 0.4 0.0 - 0.9 %    Lymphocytes % 24.3 13.0 - 44.0 %    Monocytes % 11.8 2.0 - 10.0 %    Eosinophils % 2.0 0.0 - 6.0 %    Basophils % 1.0 0.0 - 2.0 %    Neutrophils Absolute 6.86 (H) 1.60 - 5.50 x10*3/uL    Immature Granulocytes Absolute, Automated 0.04 0.00 - 0.50 x10*3/uL    Lymphocytes Absolute 2.76 0.80 - 3.00 x10*3/uL    Monocytes Absolute 1.34 (H) 0.05 - 0.80 x10*3/uL     Eosinophils Absolute 0.23 0.00 - 0.40 x10*3/uL    Basophils Absolute 0.11 (H) 0.00 - 0.10 x10*3/uL   Protime-INR    Collection Time: 07/10/24  2:07 PM   Result Value Ref Range    Protime 11.8 9.8 - 12.8 seconds    INR 1.0 0.9 - 1.1   B-Type Natriuretic Peptide    Collection Time: 07/10/24  2:07 PM   Result Value Ref Range     (H) 0 - 99 pg/mL   Magnesium    Collection Time: 07/10/24  2:07 PM   Result Value Ref Range    Magnesium 2.10 1.60 - 2.40 mg/dL   CBC and Auto Differential    Collection Time: 07/10/24  2:07 PM   Result Value Ref Range    WBC 9.6 4.4 - 11.3 x10*3/uL    nRBC 0.0 0.0 - 0.0 /100 WBCs    RBC 4.49 4.00 - 5.20 x10*6/uL    Hemoglobin 13.0 12.0 - 16.0 g/dL    Hematocrit 39.3 36.0 - 46.0 %    MCV 88 80 - 100 fL    MCH 29.0 26.0 - 34.0 pg    MCHC 33.1 32.0 - 36.0 g/dL    RDW 13.5 11.5 - 14.5 %    Platelets 286 150 - 450 x10*3/uL    Neutrophils % 63.1 40.0 - 80.0 %    Immature Granulocytes %, Automated 0.4 0.0 - 0.9 %    Lymphocytes % 21.8 13.0 - 44.0 %    Monocytes % 12.5 2.0 - 10.0 %    Eosinophils % 1.4 0.0 - 6.0 %    Basophils % 0.8 0.0 - 2.0 %    Neutrophils Absolute 6.07 (H) 1.60 - 5.50 x10*3/uL    Immature Granulocytes Absolute, Automated 0.04 0.00 - 0.50 x10*3/uL    Lymphocytes Absolute 2.10 0.80 - 3.00 x10*3/uL    Monocytes Absolute 1.20 (H) 0.05 - 0.80 x10*3/uL    Eosinophils Absolute 0.13 0.00 - 0.40 x10*3/uL    Basophils Absolute 0.08 0.00 - 0.10 x10*3/uL   Comprehensive Metabolic Panel    Collection Time: 07/10/24  2:07 PM   Result Value Ref Range    Glucose 85 74 - 99 mg/dL    Sodium 137 136 - 145 mmol/L    Potassium 4.0 3.5 - 5.3 mmol/L    Chloride 108 (H) 98 - 107 mmol/L    Bicarbonate 20 (L) 21 - 32 mmol/L    Anion Gap 13 10 - 20 mmol/L    Urea Nitrogen 15 6 - 23 mg/dL    Creatinine 0.76 0.50 - 1.05 mg/dL    eGFR 77 >60 mL/min/1.73m*2    Calcium 9.3 8.6 - 10.3 mg/dL    Albumin 4.1 3.4 - 5.0 g/dL    Alkaline Phosphatase 61 33 - 136 U/L    Total Protein 6.5 6.4 - 8.2 g/dL    AST 15 9  - 39 U/L    Bilirubin, Total 0.6 0.0 - 1.2 mg/dL    ALT 8 7 - 45 U/L   Troponin I, High Sensitivity, Initial    Collection Time: 07/10/24  2:07 PM   Result Value Ref Range    Troponin I, High Sensitivity 6 0 - 13 ng/L   Sedimentation rate, automated    Collection Time: 07/10/24  2:07 PM   Result Value Ref Range    Sedimentation Rate 3 0 - 30 mm/h   Troponin, High Sensitivity, 1 Hour    Collection Time: 07/10/24  3:09 PM   Result Value Ref Range    Troponin I, High Sensitivity 7 0 - 13 ng/L   C-reactive protein    Collection Time: 07/10/24  3:09 PM   Result Value Ref Range    C-Reactive Protein <0.10 <1.00 mg/dL   Basic metabolic panel    Collection Time: 07/11/24  4:49 AM   Result Value Ref Range    Glucose 91 74 - 99 mg/dL    Sodium 139 136 - 145 mmol/L    Potassium 4.2 3.5 - 5.3 mmol/L    Chloride 107 98 - 107 mmol/L    Bicarbonate 25 21 - 32 mmol/L    Anion Gap 11 10 - 20 mmol/L    Urea Nitrogen 15 6 - 23 mg/dL    Creatinine 0.73 0.50 - 1.05 mg/dL    eGFR 81 >60 mL/min/1.73m*2    Calcium 8.9 8.6 - 10.3 mg/dL   CBC    Collection Time: 07/11/24  4:49 AM   Result Value Ref Range    WBC 8.5 4.4 - 11.3 x10*3/uL    nRBC 0.0 0.0 - 0.0 /100 WBCs    RBC 4.32 4.00 - 5.20 x10*6/uL    Hemoglobin 12.4 12.0 - 16.0 g/dL    Hematocrit 38.2 36.0 - 46.0 %    MCV 88 80 - 100 fL    MCH 28.7 26.0 - 34.0 pg    MCHC 32.5 32.0 - 36.0 g/dL    RDW 13.6 11.5 - 14.5 %    Platelets 304 150 - 450 x10*3/uL       CV Studies:  EKG:No results found for this or any previous visit (from the past 4464 hour(s)).  Echocardiogram: No results found for this or any previous visit from the past 1825 days.    Stress Testing IMESULT(LUT6361:1:1825): No results found for this or any previous visit from the past 1825 days.    Cardiac Catheterization: No results found for this or any previous visit from the past 1825 days.  No results found for this or any previous visit from the past 3650 days.     Cardiac Scoring: No results found for this or any previous  visit from the past 1825 days.    AAA : No results found for this or any previous visit from the past 1825 days.    OTHER: No results found for this or any previous visit from the past 1825 days.    LAST IMAGING RESULTS  Point of Care Ultrasound  Cyrus Chou MD     7/10/2024  3:27 PM    Performed by: Cyrus Chou MD  Authorized by: Cyrus Chou MD  Cardiac Indications:   palpitations    Procedure: Cardiac Ultrasound    Findings:   Views: parasternal long, parasternal short, apical four and subxiphoid  Effusion: The pericardial space was visualized and was positive for a   PERICARDIAL EFFUSION.  Activity: Ventricular contractions were visualized.  LV: LV systolic function was NORMAL.  RV: RV size was NORMAL.    Impression:  Cardiac: The focused cardiac ultrasound exam had ABNORMAL findings as   specified.  XR chest 1 view  Narrative: Interpreted By:  Luz Rivera,   STUDY:  Chest, single AP view.      INDICATION:  Signs/Symptoms:CP.      COMPARISON:  02/23/2009      ACCESSION NUMBER(S):  [id number]      ORDERING CLINICIAN:  JIM CROSS      FINDINGS:  Mildly enlarged cardiac silhouette size.  Atherosclerosis of the thoracic aorta.  Spinal stimulator leads visualized projecting over the midthoracic  region. No focal consolidation, edema, or pneumothorax.  No sizeable pleural effusion.  No acute osseous abnormality.      Impression: 1. Mild cardiomegaly and/or pericardial effusion.  2. No focal pulmonary consolidation      MACRO:  None.      Signed by: Luz Rivera 7/10/2024 2:57 PM  Dictation workstation:   [id number]        Paulo Galicia DO, FACC, FACOI  7/11/2024  8:59 AM

## 2024-07-11 NOTE — NURSING NOTE
Patient given discharge instructions with  at bedside, both voiced understanding. Patient will be transported home by .

## 2024-07-11 NOTE — PROGRESS NOTES
07/11/24 9455   Discharge Planning   Living Arrangements Spouse/significant other   Support Systems Spouse/significant other   Assistance Needed Independent with walker or cane   Type of Residence Private residence   Home or Post Acute Services None   Expected Discharge Disposition Home   Does the patient need discharge transport arranged? No   Financial Resource Strain   How hard is it for you to pay for the very basics like food, housing, medical care, and heating? Not hard   Housing Stability   In the last 12 months, was there a time when you were not able to pay the mortgage or rent on time? N   At any time in the past 12 months, were you homeless or living in a shelter (including now)? N   Transportation Needs   In the past 12 months, has lack of transportation kept you from medical appointments or from getting medications? no   In the past 12 months, has lack of transportation kept you from meetings, work, or from getting things needed for daily living? No     PCP is Rachel Winter MD. Patient is from home with her . Patient is independent with her walker or cane on ambulation, self care, driving, shopping, and meals.  Patient plans to return home with no needs upon discharge. [facility] will continue to follow for needs if they arise.

## 2024-07-11 NOTE — DISCHARGE INSTRUCTIONS
Follow up with PCP in 1-2 weeks. Call to schedule. Bring all your discharge paperwork and medications when you go to your follow up appointment. Return to ED if your symptoms come back.    Follow-up with cardiology in 1 week, schedule outpatient Zio patch.

## 2024-07-11 NOTE — CARE PLAN
The patient's goals for the shift include      The clinical goals for the shift include Maintaining safety throughout this shift.

## 2024-07-11 NOTE — CARE PLAN
The patient's goals for the shift include      The clinical goals for the shift include no falls or injury

## 2024-07-11 NOTE — CARE PLAN
The clinical goals for the shift include no falls or injury      Problem: Pain - Adult  Goal: Verbalizes/displays adequate comfort level or baseline comfort level  Outcome: Progressing     Problem: Safety - Adult  Goal: Free from fall injury  Outcome: Progressing     Problem: Discharge Planning  Goal: Discharge to home or other facility with appropriate resources  Outcome: Progressing     Problem: Chronic Conditions and Co-morbidities  Goal: Patient's chronic conditions and co-morbidity symptoms are monitored and maintained or improved  Outcome: Progressing     Problem: Pain  Goal: Takes deep breaths with improved pain control throughout the shift  Outcome: Progressing  Goal: Turns in bed with improved pain control throughout the shift  Outcome: Progressing  Goal: Walks with improved pain control throughout the shift  Outcome: Progressing  Goal: Performs ADL's with improved pain control throughout shift  Outcome: Progressing  Goal: Participates in PT with improved pain control throughout the shift  Outcome: Progressing  Goal: Free from opioid side effects throughout the shift  Outcome: Progressing  Goal: Free from acute confusion related to pain meds throughout the shift  Outcome: Progressing

## 2024-07-11 NOTE — DISCHARGE SUMMARY
Discharge Diagnosis  Chest pain    Issues Requiring Follow-Up  Outpatient Zio patch through cardiology.  Follow-up with PCP as scheduled.  Follow-up with pain management as scheduled    Discharge Meds     Your medication list        ASK your doctor about these medications        Instructions Last Dose Given Next Dose Due   alendronate 70 mg tablet  Commonly known as: Fosamax           amLODIPine 10 mg tablet  Commonly known as: Norvasc           biotin 1 mg tablet           Bystolic 10 mg tablet  Generic drug: nebivolol           cholecalciferol 50 mcg (2,000 unit) capsule  Commonly known as: Vitamin D-3           DULoxetine 30 mg DR capsule  Commonly known as: Cymbalta      Take 1 capsule (30 mg) by mouth once daily. Do not crush or chew.       HYDROcodone-acetaminophen 5-325 mg tablet  Commonly known as: Norco      Take 1 tablet by mouth 2 times a day as needed for severe pain (7 - 10).       methocarbamol 500 mg tablet  Commonly known as: Robaxin      Take 0.5 tablets (250 mg) by mouth 2 times a day as needed for muscle spasms.       naloxone 4 mg/0.1 mL nasal spray  Commonly known as: Narcan           omeprazole 40 mg DR capsule  Commonly known as: PriLOSEC           ondansetron 4 mg tablet  Commonly known as: Zofran           rosuvastatin 40 mg tablet  Commonly known as: Crestor           spironolactone 25 mg tablet  Commonly known as: Aldactone                    Test Results Pending At Discharge  Pending Labs       Order Current Status    TSH with reflex to Free T4 if abnormal Collected (07/11/24 1602)            Hospital Course   Janelle Jung is a 83 y.o. female with PMHx s/f hypertension,dyslipidemia, osteopenia, back pain presenting with palpitation and chest pressure.  The patient has been dealing with increased back pain is taking opiate pain medication twice a day but still has uncontrollable pain.  She has issues in her spine from her neck on down patient has been under a lot of stress related to  the pain.  She is felt palpitations and pressure in her chest blood pressure has been quite elevated at times.  Patient has been experiencing those palpitations did see her primary care provider yesterday she presented to the hospital today due to persistence and worsening of her symptoms.  She is not experiencing any fevers or chills urinary symptoms diarrhea no hemoptysis no purulent sputum.  Chest discomfort is a pressure in the mid part of her chest and it is nonradiating in nature no definitive alleviating or aggravating factors no diaphoresis associated with her discomfort.  Eitel signs on presentation emergency department showed temperature 96.8 heart rate 81 respiratory rate 20 blood pressure 152/73.  Blood work showed chemistry panel with sodium 137 potassium 4.0 chloride 108 CO2 20 BN peptide 673 troponin 6 repeat troponin 7 but BN peptide was elevated at 673 CBC within normal limits.  Rest x-ray appears to show either cardiomegaly or small cardial effusion, ED provider performed bedside ultrasound and noted that patient did have pericardial effusion LV function was noted to be normal.  EKG shows sinus rhythm with PVCs.  Patient is to be admitted to medical floor for further evaluation of her chest discomfort apparent pericardial effusion.     7/611: Patient seen.  No chest pain at present.  Returned to room following stress test.  2D echocardiogram done in department does not correlate with a pericardial effusion.  Studies have remained benign.  No chest pain or shortness of breath at present.  Appears to be related to environmental stressors.  Nuclear stress test without ischemia or scar.  Small reversible defect at the apex which disappears with body motion.  Discussed with cardiology, feels that this is artifact.  Okay to discharge home without additional cardiac studies at this time.  Doing well otherwise.  Chronic back pain.  Okay to discharge to home.  Cardiology to arrange outpatient Zio patch.   Follow-up with PCP in 1 to 2 weeks.  Follow-up with cardiology in 1 week.    This discharge took greater than 35 minutes to arrange.    Pericardial effusion.  No tamponade.  Initially abnormal chest x-ray was not corroborated by MRI testing.  Suspect noncardiac and most likely musculoskeletal.  Okay to discharge to home.  Essential hypertension.  Continue home medications.  Patient still running borderline high, recommend follow-up with PCP as outpatient.  Chronic back pain, follow-up with pain clinic.  Hyperlipidemia.  Continue current medications.    Pertinent Physical Exam At Time of Discharge  Physical Exam  HENT:      Head: Normocephalic and atraumatic.      Nose: Nose normal. No congestion or rhinorrhea.      Mouth/Throat:      Mouth: Mucous membranes are dry.      Pharynx: Oropharynx is clear.   Eyes:      General: No scleral icterus.     Extraocular Movements: Extraocular movements intact.      Pupils: Pupils are equal, round, and reactive to light.   Cardiovascular:      Rate and Rhythm: Normal rate and regular rhythm.      Heart sounds: Normal heart sounds. No murmur heard.     No friction rub. No gallop.   Pulmonary:      Effort: Pulmonary effort is normal.      Breath sounds: Normal breath sounds. No wheezing, rhonchi or rales.   Chest:      Chest wall: No tenderness.   Abdominal:      General: There is no distension.      Palpations: Abdomen is soft.      Tenderness: There is no abdominal tenderness. There is no guarding or rebound.   Musculoskeletal:         General: No swelling, tenderness or signs of injury. Normal range of motion.      Cervical back: Normal range of motion.   Skin:     General: Skin is warm and dry.      Coloration: Skin is not jaundiced.      Findings: No bruising, erythema or rash.   Neurological:      General: No focal deficit present.      Mental Status: She is oriented to person, place, and time.         Outpatient Follow-Up  Future Appointments   Date Time Provider Department  [place]   7/15/2024  2:30 PM Radha Bullard, APRN-CNP, [facility]         Esteban Bergeron MD

## 2024-07-14 LAB
ATRIAL RATE: 69 BPM
P AXIS: 44 DEGREES
PR INTERVAL: 176 MS
Q ONSET: 249 MS
QRS COUNT: 11 BEATS
QRS DURATION: 94 MS
QT INTERVAL: 423 MS
QTC CALCULATION(BAZETT): 454 MS
QTC FREDERICIA: 443 MS
R AXIS: -18 DEGREES
T AXIS: 41 DEGREES
T OFFSET: 461 MS
VENTRICULAR RATE: 69 BPM

## 2024-07-15 ENCOUNTER — OFFICE VISIT (OUTPATIENT)
Dept: PAIN MEDICINE | Facility: HOSPITAL | Age: 84
End: 2024-07-15
Payer: MEDICARE

## 2024-07-15 VITALS
SYSTOLIC BLOOD PRESSURE: 132 MMHG | DIASTOLIC BLOOD PRESSURE: 66 MMHG | WEIGHT: 165 LBS | HEIGHT: 62 IN | HEART RATE: 58 BPM | OXYGEN SATURATION: 94 % | RESPIRATION RATE: 16 BRPM | BODY MASS INDEX: 30.36 KG/M2

## 2024-07-15 DIAGNOSIS — M96.1 LUMBAR POSTLAMINECTOMY SYNDROME: Primary | ICD-10-CM

## 2024-07-15 DIAGNOSIS — M79.18 MYOFASCIAL PAIN: ICD-10-CM

## 2024-07-15 PROCEDURE — 1159F MED LIST DOCD IN RCRD: CPT | Performed by: CLINICAL NURSE SPECIALIST

## 2024-07-15 PROCEDURE — 1123F ACP DISCUSS/DSCN MKR DOCD: CPT | Performed by: CLINICAL NURSE SPECIALIST

## 2024-07-15 PROCEDURE — 99214 OFFICE O/P EST MOD 30 MIN: CPT | Performed by: CLINICAL NURSE SPECIALIST

## 2024-07-15 PROCEDURE — 3075F SYST BP GE 130 - 139MM HG: CPT | Performed by: CLINICAL NURSE SPECIALIST

## 2024-07-15 PROCEDURE — 3078F DIAST BP <80 MM HG: CPT | Performed by: CLINICAL NURSE SPECIALIST

## 2024-07-15 PROCEDURE — 1160F RVW MEDS BY RX/DR IN RCRD: CPT | Performed by: CLINICAL NURSE SPECIALIST

## 2024-07-15 PROCEDURE — 1036F TOBACCO NON-USER: CPT | Performed by: CLINICAL NURSE SPECIALIST

## 2024-07-15 RX ORDER — DIAZEPAM 5 MG/1
5 TABLET ORAL ONCE AS NEEDED
OUTPATIENT
Start: 2024-07-15

## 2024-07-15 ASSESSMENT — PATIENT HEALTH QUESTIONNAIRE - PHQ9
1. LITTLE INTEREST OR PLEASURE IN DOING THINGS: NOT AT ALL
SUM OF ALL RESPONSES TO PHQ9 QUESTIONS 1 AND 2: 1
2. FEELING DOWN, DEPRESSED OR HOPELESS: SEVERAL DAYS

## 2024-07-15 ASSESSMENT — ENCOUNTER SYMPTOMS
LOSS OF SENSATION IN FEET: 0
OCCASIONAL FEELINGS OF UNSTEADINESS: 1
DEPRESSION: 0

## 2024-07-15 NOTE — PROGRESS NOTES
Subjective   Patient ID: Janelle Jung is a 83 y.o. female who presents for thoracic and lumbar pain    HPI  83-year-old female with history of chronic low back pain status post L3-S1 fusion with L3-5 laminectomy presents for follow-up.  Patient has a spinal cord stimulator with [employer] for lumbar radicular symptoms/lumbar postlaminectomy syndrome.  She typically gets good relief with her stimulator, however, at her last office visit felt her stimulator was not providing adequate coverage.  Encouraged to reach out to the [employer] representative.  Diagnostic lumbar facet injections and caudal epidural steroid injections have provided minimal relief most recent thoracic x-ray to evaluate thoracic pain was consistent with multilevel spondylosis with no acute fracture.  Completed a formal course of physical therapy to treat the myofascial component of her thoracic back pain as well as to address her posture, gait and balance.  She felt the physical therapy increased her pain.  Continues home therapy exercises and stretches.  At that point since she had failed multiple attempts at conservative treatment she was sent for repeat lumbar MRI.  She manages her pain with a low-dose of hydrocodone 5 mg up to 2 times per day as needed, duloxetine 30 mg/day and Robaxin for muscle tightness and spasm.  Nortriptyline prescribed by her PCP.  She presents at today's office visit with low back pain radiating from her mid back to her lumbar spine.  Reviewed recent lumbar MRI consistent with stable imaging as compared to the MRI in 2022,degenerative postoperative changes without high-grade central canal or foraminal stenosis identified.  Patient states that pain radiates into bilateral lower extremities to the level of her feet.  She endorses numbness and tingling as well as weakness in her lower extremities.  Denies any changes in bowel/bladder function.  Her pain is aching and throbbing.  Her pain increases with  "standing even for short amount of time.  She feels that her pain is getting worse and is limiting her ability to be active.  She does not feel that her spinal cord stimulator is providing adequate coverage of lumbar radicular symptoms.  She has not reached out to the [employer] representative.  As far as medication she states that she takes her hydrocodone 5 mg 2 times per day, although she has 19 pills left with a fill date of 6/11/2024 for 30 days.  She does not feel that hydrocodone provides adequate relief for her pain.  She is asking for an increased dose of this medication.  Patient is experiencing some confusion about her medications.  She is unsure if she is taking duloxetine and nortriptyline.  Previously endorsed relief with Robaxin for muscle tightness and spasms.  She recently discontinued Robaxin secondary to cognitive side effects.      Location of Pain:pt is here for interval follow up and medication count. Patient states she is having low back pain and that moves up the back to the middle back and neck. New \"pins and needles\" from mid back to the feet especially while lying down.      Pain Score: 6/10     Treatment: Norco 5/325mg BID  Count: 19  Fill Date: 6/11/2024  Last Dose Taken: 07/15/2024    Narcan: exp 09/2024     Other medications: Duloxetine 30mg, stopped methocarbamol and amitriptyline due to side effects of AMS     Injection/Procedures: SCS [employer]- helps with radicular pain but not lower back pain/ Bilateral Lumbar MBB - 0% relief with 2nd injection     PT evaluation and insurance approved so will start PT    OARRS:  No data recorded  I have personally reviewed the OARRS report for Janelle Jung. I have considered the risks of abuse, dependence, addiction and diversion    Is the patient prescribed a combination of a benzodiazepine and opioid?  No    Last Urine Drug Screen / ordered today: No 04/24/2024  Recent Results (from the past 8760 hour(s))   Confirmation " Opiate/Opioid/Benzo Prescription Compliance    Collection Time: 04/24/24  4:18 PM   Result Value Ref Range    Clonazepam <25 <25 ng/mL    7-Aminoclonazepam <25 <25 ng/mL    Alprazolam <25 <25 ng/mL    Alpha-Hydroxyalprazolam <25 <25 ng/mL    Midazolam <25 <25 ng/mL    Alpha-Hydroxymidazolam <25 <25 ng/mL    Chlordiazepoxide <25 <25 ng/mL    Diazepam <25 <25 ng/mL    Nordiazepam <25 <25 ng/mL    Temazepam <25 <25 ng/mL    Oxazepam <25 <25 ng/mL    Lorazepam <25 <25 ng/mL    Methadone <25 <25 ng/mL    EDDP <25 <25 ng/mL    6-Acetylmorphine <25 <25 ng/mL    Codeine <50 <50 ng/mL    Hydrocodone >2,500 (H) <25 ng/mL    Hydromorphone 658 (H) <25 ng/mL    Morphine  <50 <50 ng/mL    Norhydrocodone >1,000 (H) <25 ng/mL    Noroxycodone <25 <25 ng/mL    Oxycodone <25 <25 ng/mL    Oxymorphone <25 <25 ng/mL    Fentanyl <2.5 <2.5 ng/mL    Norfentanyl <2.5 <2.5 ng/mL    Tramadol <50 <50 ng/mL    O-Desmethyltramadol <50 <50 ng/mL    Zolpidem <25 <25 ng/mL    Zolpidem Metabolite (ZCA) <25 <25 ng/mL   Screen Opiate/Opioid/Benzo Prescription Compliance    Collection Time: 04/24/24  4:18 PM   Result Value Ref Range    Creatinine, Urine Random 242.8 20.0 - 320.0 mg/dL    Amphetamine Screen, Urine Presumptive Negative Presumptive Negative    Barbiturate Screen, Urine Presumptive Negative Presumptive Negative    Cannabinoid Screen, Urine Presumptive Negative Presumptive Negative    Cocaine Metabolite Screen, Urine Presumptive Negative Presumptive Negative    PCP Screen, Urine Presumptive Negative Presumptive Negative   Tapentadol Confirmation, Urine    Collection Time: 04/24/24  4:18 PM   Result Value Ref Range    Tapentadol <25 <25 ng/mL    N-Desmethyltapentadol <25 <25 ng/mL   Buprenorphine Confirm,Urine    Collection Time: 04/24/24  4:18 PM   Result Value Ref Range    BUPRENORPHINE GLUC, URINE <5 ng/mL    BUPRENORPHINE ,URINE <2 ng/mL    NALOXONE, URINE <100 ng/mL    NORBUPRENORPHINE GLUC,URINE <5 ng/mL    NORBUPRENORPHINE, URINE <2  ng/mL     Results are as expected.     Controlled Substance Agreement:  Date of the Last Agreement:  09/05/2024  Reviewed Controlled Substance Agreement including but not limited to the benefits, risks, and alternatives to treatment with a Controlled Substance medication(s).    Monitoring and compliance:    ORT: 09/05/2024    PDUQ: 04/24/2024    Office Agreement: 05/23/2024      Review of Systems    ROS:   General: No fevers, chills, weight loss  Skin: Negative for lesions  Eyes: No acute vision changes  Ears: No vertigo  Nose, mouth, throat: No difficulty swallowing or speaking  Respiratory: No cough, shortness of breath, cyanosis  Cardiovascular: Negative for chest pain syncope or palpitation  Gastrointestinal: No constipation, nausea, vomiting  Neurological: Negative for headache, positive for: Paresthesia and weakness  Psychological: Negative for severe or debilitating anxiety, depression. Negative memory loss  Musculoskeletal: Positive for arthralgia, myalgia, pain and spasm  Endocrine: Negative for weight gain, appetite changes, excessive sweating  Allergy/immune: Negative    All 13 systems were reviewed and are within normal levels except as noted or in the history of present illness.  Positive or pertinent negative responses are noted or were in the history of present illness. As noted, the patient denies significant or impairing weakness in the bilateral upper and lower extremities, medication induced constipation, and bowel or bladder incontinence.     Current Outpatient Medications:     alendronate (Fosamax) 70 mg tablet, Take 1 tablet (70 mg) by mouth every 7 days. Take in the morning with a full glass of water, on an empty stomach, and do not take anything else by mouth or lie down for the next 30 min., Disp: , Rfl:     amLODIPine (Norvasc) 10 mg tablet, Take 1 tablet (10 mg) by mouth once daily., Disp: , Rfl:     biotin 1 mg tablet, Take 1 tablet (1 mg) by mouth once daily., Disp: , Rfl:     Bystolic  10 mg tablet, Take 1 tablet (10 mg) by mouth once daily., Disp: , Rfl:     cholecalciferol (Vitamin D-3) 50 mcg (2,000 unit) capsule, Take by mouth., Disp: , Rfl:     DULoxetine (Cymbalta) 30 mg DR capsule, Take 1 capsule (30 mg) by mouth once daily. Do not crush or chew., Disp: 30 capsule, Rfl: 2    naloxone (Narcan) 4 mg/0.1 mL nasal spray, Administer into affected nostril(s)., Disp: , Rfl:     omeprazole (PriLOSEC) 40 mg DR capsule, Take 1 capsule (40 mg) by mouth once daily., Disp: , Rfl:     ondansetron (Zofran) 4 mg tablet, Take 1 tablet (4 mg) by mouth every 8 hours if needed for nausea or vomiting., Disp: , Rfl:     rosuvastatin (Crestor) 40 mg tablet, Take 1 tablet (40 mg) by mouth once daily., Disp: , Rfl:     spironolactone (Aldactone) 25 mg tablet, Take 1 tablet (25 mg) by mouth once daily., Disp: , Rfl:      Past Medical History:   Diagnosis Date    Essential (primary) hypertension     Hypertension, well controlled        No past surgical history on file.     No family history on file.     Allergies   Allergen Reactions    Adhesive Tape-Silicones Unknown    Iodinated Contrast Media Unknown    Iodine Unknown    Latex Unknown    Metronidazole Unknown    Niacin Unknown    Sulfamethoxazole Unknown    Sulfites Unknown        Objective     Visit Vitals  Smoking Status Former        Physical Exam    PE:  General: Well-developed, well-nourished, no acute distress. The patient demonstrates no pain behavior, symptom magnification or overt drug-seeking behavior.  Eye: Pupils appropriate for room lighting  Neck/thyroid: No obvious goiter or enlargement of neck noted  Respiratory exam: Normal respiratory effort, unlabored respiration. No accessory muscle use noted  Cardiac exam: Bilateral radial pulses intact  Abdominal: Nondistended  Spine, thoracic: Minimal tenderness paraspinous musculature thoracic region.  No tenderness over thoracic spine.  No gross step-offs noted.  Spine, lumbar: The patient is able to rise  from a seated to standing position with hesitancy.  Gait is slow and deliberate.  Favoring right lower extremity.  Posture remains forward leaning.  Tenderness to paraspinous muscular to her lower lumbar spine.  Flexion intact with extension increasing pain across her low back.  Positive straight leg raise right lower extremity.   Neurologic exam: Muscle strength is antigravity in all 4 extremities.  Psychiatric exam: Judgment and insight normal, affect normal, speech is fluent, affect appropriate, demonstrating no signs of hypersomnolence, sedation, or confusion          Assessment/Plan   Problem List Items Addressed This Visit             ICD-10-CM    Lumbar postlaminectomy syndrome - Primary M96.1     83-year-old female with symptoms consistent with postlaminectomy syndrome presents for follow up.  Experiencing persistent lumbar radicular symptoms.  Lumbar pain radiating to bilateral lower extremities accompanied by numbness/tingling weakness.  Denies any changes in bowel/bladder function.  Patient does not feel that her spinal cord stimulator is capturing her lower extremities appropriately.  She has not reached out to the [employer] representative.  I do think it would be beneficial to have her spinal cord stimulator interrogated and reprogrammed to provide additional pain relief.  We will arrange for the [employer] representative to be at the patient's next office visit to interrogate her stimulator.  Reviewed the patient's MRI findings and current presentation with Dr. Luo who suggested she may benefit from retrying a caudal epidural steroid injection targeting lumbar postlaminectomy symptoms.  Reviewed risks and benefits with patient and her  and the patient would like to proceed with this injection.  As far as medication I explained to the patient that I cannot increase her dose of hydrocodone if she is endorsing minimal relief with her current dose and also not utilizing the  medication 2 times per day as prescribed.  Recommended that the patient try taking her Norco 2 times daily consistently to see if it provides additional pain relief.  If she fails to receive relief with taking Norco 2 times daily consistently, we could wean her off this medication due to potential tolerance.  At that point she may benefit from a different opiate such as a low-dose of oxycodone.  Reminded the patient in order to control her pain effectively we must utilize a multimodal approach and not just rely on medication.  Unfortunately the patient has failed a trial of buprenorphine which could have provided more sustained pain relief.  She also failed a trial of gabapentin.  Advised the patient to restart duloxetine 30 mg daily as this was most likely providing her relief of neuropathic/radicular symptoms.  Patient is also unsure if she is taking nortriptyline which could have provided benefit for neuropathic pain as well.  Advised patient to check her medication at home and notify our office if she is taking either duloxetine or nortriptyline.  At that time I am happy to provide a refill.  She also may call for refill of hydrocodone when needed.  Recommended she try to continue her home physical therapy exercises and stretches consistently.  Plan reviewed with patient at today's office visit.    -For a caudal epidural steroid injection targeting symptoms consistent with lumbar neuritis/lumbar postlaminectomy syndrome.  Reviewed risks and benefits with patient and she would like to proceed.  -Continue hydrocodone 5 mg up to 2 times per day as needed for pain.  Advised patient to try to take this medication 2 times per day as prescribed consistently for several weeks to see if it will provide additional pain relief.  If she fails to receive relief with this medication we may wean her off this medication due to potential tolerance.  She may benefit from rotation to a different opiates such as a low-dose of  oxycodone.   -Restart duloxetine 30 mg daily.  May consider increasing this dose in the future if patient feels it is beneficial.  -Patient will check to see if she is taking nortriptyline 10 mg at bedtime.  Advised patient that this medication could also provide additional neuropathic pain relief.  -We will contact the [employer] representative and ask her to meet the patient at her next office visit to interrogate her stimulator and potentially reprogram the stimulator to provide additional pain relief.  -Encourage patient to continue home therapy exercises and stretches consistently.  -Patient will follow-up approximately 4 weeks after her injection for reevaluation.  MEDICAL DECISION MAKING:    My impressions and treatment recommendations were discussed in detail with the patient, who verbalized understanding and had no further questions prior to discharge. Given the patient's report of reduced pain and improved functional ability without adverse effects,  it is reasonable to continue hydrocodone 5mg up to 2 times per day as needed. The terms of the opioid agreement as well as the potential risks and adverse effects of the patient's medication regimen were discussed in detail. This includes if applicable due to dosage of medication permission to discuss and coordinate care with other treatment providers relevant to the patients condition. The patient verbalized understanding.    Treatment goals were discussed in detail with the patient.  These goals include reduction of pain levels, improved levels of functioning, avoidance of medication side effect and lowest medication dose possible to achieve  these goals.  The patient was in full agreement with these goals.  Also discussed is the understanding that pain may not be eliminated by medication but that the goal of a better sustaining life through use of medication is appropriate.  Lifestyle modifications including weight management, stretching, diet,  exercise and smoking are addressed at each office visit.  The patient provided a urine drug screen for routine monitoring and compliance.  This test may be given as frequently as every month based on the patient's individual opiate risk stratification and prescriber concerns for any aberrancies.  This test is indicated given the use of controlled substances for the patient's medical condition.  Unless otherwise noted the prior (s) urine drug testing results were consistent with prescribed medications.  There is no evidence of illicit drug use or additional medications ingested.     Risks and side effects of chronic opioid therapy including but not limited to tolerance, dependence, constipation, hyperalgesia, cognitive side effects, addiction and possible death due to overuse and or misuse were discussed. I also discussed that such medications when co-administered with other sedative agents including but not limited to alcohol, benzodiazepines, sedative hypnotics and illegal drugs could pose life threatening consequences including death.  I also explained the impact that the administration of such medication has on a patient with obstructive sleep apnea and continued recommendations for use of apnea devices if ordered are prescribed by other physicians.  In order to effectively and safely treat the pain, I also emphasized the importance of compliance with the treatment plan as well as compliance with the terms of the opioid agreement which was reviewed in detail. I explained the importance of being responsible with the medications and to take these only as prescribed, never in excess and never for reasons other than pain reduction. The patient was counseled on keeping the medications safe and locked away from children and other adults as well as disposal methods and options. The patient understood the risks and instructions.      I also discussed with the patient in detail that based on the clinical response to the  opioid medications and improvements of activities of daily living, sleep, and work performance in light of compliance with the treatment plan we can continue this form of therapy for the above chronic  pain.  The goal and rationale used for current treatment with chronic opioid medication is to control the pain and alleviate disability induced by the chronic pain condition noted above after failures of other non-opioid and nonpharmacological modalities to treat the chronic pain and the symptoms associated with have failed.  The patient understood the goals in terms of the above treatment plan and had no further questions prior to leaving the office today.    Given the patient's total MED, general use of daily opiates, or other coadministered medications in various classes the patient was offered a prescription for Narcan.  I instructed the patient that Narcan is for emergency use only and is worse suspected or known opiate overdose.  Call 911 prior to administration of this medication to activate the emergency response team.  It is imperative to fill this medication in order to demonstrate understanding of the gravity of possible side effects including respiratory depression and risk of overdose of this opiate load or medication combination.  As such patients will be required to bring Narcan prescriptions to follow-up appointments as part of the compliance with continued opiate care.    Disclaimer: This note was transcribed using an audio transcription device.  As such, minor errors may be present with regard to spelling, punctuation, and inadvertent word insertion.  Please disregard such errors.             Relevant Orders    [place] pain management    Epidural Steroid Injection    Myofascial pain M79.18

## 2024-07-15 NOTE — H&P (VIEW-ONLY)
Subjective   Patient ID: Janelle Jung is a 83 y.o. female who presents for thoracic and lumbar pain    HPI  83-year-old female with history of chronic low back pain status post L3-S1 fusion with L3-5 laminectomy presents for follow-up.  Patient has a spinal cord stimulator with [employer] for lumbar radicular symptoms/lumbar postlaminectomy syndrome.  She typically gets good relief with her stimulator, however, at her last office visit felt her stimulator was not providing adequate coverage.  Encouraged to reach out to the [employer] representative.  Diagnostic lumbar facet injections and caudal epidural steroid injections have provided minimal relief most recent thoracic x-ray to evaluate thoracic pain was consistent with multilevel spondylosis with no acute fracture.  Completed a formal course of physical therapy to treat the myofascial component of her thoracic back pain as well as to address her posture, gait and balance.  She felt the physical therapy increased her pain.  Continues home therapy exercises and stretches.  At that point since she had failed multiple attempts at conservative treatment she was sent for repeat lumbar MRI.  She manages her pain with a low-dose of hydrocodone 5 mg up to 2 times per day as needed, duloxetine 30 mg/day and Robaxin for muscle tightness and spasm.  Nortriptyline prescribed by her PCP.  She presents at today's office visit with low back pain radiating from her mid back to her lumbar spine.  Reviewed recent lumbar MRI consistent with stable imaging as compared to the MRI in 2022,degenerative postoperative changes without high-grade central canal or foraminal stenosis identified.  Patient states that pain radiates into bilateral lower extremities to the level of her feet.  She endorses numbness and tingling as well as weakness in her lower extremities.  Denies any changes in bowel/bladder function.  Her pain is aching and throbbing.  Her pain increases with  "standing even for short amount of time.  She feels that her pain is getting worse and is limiting her ability to be active.  She does not feel that her spinal cord stimulator is providing adequate coverage of lumbar radicular symptoms.  She has not reached out to the [employer] representative.  As far as medication she states that she takes her hydrocodone 5 mg 2 times per day, although she has 19 pills left with a fill date of 6/11/2024 for 30 days.  She does not feel that hydrocodone provides adequate relief for her pain.  She is asking for an increased dose of this medication.  Patient is experiencing some confusion about her medications.  She is unsure if she is taking duloxetine and nortriptyline.  Previously endorsed relief with Robaxin for muscle tightness and spasms.  She recently discontinued Robaxin secondary to cognitive side effects.      Location of Pain:pt is here for interval follow up and medication count. Patient states she is having low back pain and that moves up the back to the middle back and neck. New \"pins and needles\" from mid back to the feet especially while lying down.      Pain Score: 6/10     Treatment: Norco 5/325mg BID  Count: 19  Fill Date: 6/11/2024  Last Dose Taken: 07/15/2024    Narcan: exp 09/2024     Other medications: Duloxetine 30mg, stopped methocarbamol and amitriptyline due to side effects of AMS     Injection/Procedures: SCS [facility] [employer]- helps with radicular pain but not lower back pain/ Bilateral Lumbar MBB - 0% relief with 2nd injection     PT evaluation and insurance approved so will start PT    OARRS:  No data recorded  I have personally reviewed the OARRS report for Janelle Jung. I have considered the risks of abuse, dependence, addiction and diversion    Is the patient prescribed a combination of a benzodiazepine and opioid?  No    Last Urine Drug Screen / ordered today: No 04/24/2024  Recent Results (from the past 8760 hour(s))   Confirmation " Opiate/Opioid/Benzo Prescription Compliance    Collection Time: 04/24/24  4:18 PM   Result Value Ref Range    Clonazepam <25 <25 ng/mL    7-Aminoclonazepam <25 <25 ng/mL    Alprazolam <25 <25 ng/mL    Alpha-Hydroxyalprazolam <25 <25 ng/mL    Midazolam <25 <25 ng/mL    Alpha-Hydroxymidazolam <25 <25 ng/mL    Chlordiazepoxide <25 <25 ng/mL    Diazepam <25 <25 ng/mL    Nordiazepam <25 <25 ng/mL    Temazepam <25 <25 ng/mL    Oxazepam <25 <25 ng/mL    Lorazepam <25 <25 ng/mL    Methadone <25 <25 ng/mL    EDDP <25 <25 ng/mL    6-Acetylmorphine <25 <25 ng/mL    Codeine <50 <50 ng/mL    Hydrocodone >2,500 (H) <25 ng/mL    Hydromorphone 658 (H) <25 ng/mL    Morphine  <50 <50 ng/mL    Norhydrocodone >1,000 (H) <25 ng/mL    Noroxycodone <25 <25 ng/mL    Oxycodone <25 <25 ng/mL    Oxymorphone <25 <25 ng/mL    Fentanyl <2.5 <2.5 ng/mL    Norfentanyl <2.5 <2.5 ng/mL    Tramadol <50 <50 ng/mL    O-Desmethyltramadol <50 <50 ng/mL    Zolpidem <25 <25 ng/mL    Zolpidem Metabolite (ZCA) <25 <25 ng/mL   Screen Opiate/Opioid/Benzo Prescription Compliance    Collection Time: 04/24/24  4:18 PM   Result Value Ref Range    Creatinine, Urine Random 242.8 20.0 - 320.0 mg/dL    Amphetamine Screen, Urine Presumptive Negative Presumptive Negative    Barbiturate Screen, Urine Presumptive Negative Presumptive Negative    Cannabinoid Screen, Urine Presumptive Negative Presumptive Negative    Cocaine Metabolite Screen, Urine Presumptive Negative Presumptive Negative    PCP Screen, Urine Presumptive Negative Presumptive Negative   Tapentadol Confirmation, Urine    Collection Time: 04/24/24  4:18 PM   Result Value Ref Range    Tapentadol <25 <25 ng/mL    N-Desmethyltapentadol <25 <25 ng/mL   Buprenorphine Confirm,Urine    Collection Time: 04/24/24  4:18 PM   Result Value Ref Range    BUPRENORPHINE GLUC, URINE <5 ng/mL    BUPRENORPHINE ,URINE <2 ng/mL    NALOXONE, URINE <100 ng/mL    NORBUPRENORPHINE GLUC,URINE <5 ng/mL    NORBUPRENORPHINE, URINE <2  ng/mL     Results are as expected.     Controlled Substance Agreement:  Date of the Last Agreement:  09/05/2024  Reviewed Controlled Substance Agreement including but not limited to the benefits, risks, and alternatives to treatment with a Controlled Substance medication(s).    Monitoring and compliance:    ORT: 09/05/2024    PDUQ: 04/24/2024    Office Agreement: 05/23/2024      Review of Systems    ROS:   General: No fevers, chills, weight loss  Skin: Negative for lesions  Eyes: No acute vision changes  Ears: No vertigo  Nose, mouth, throat: No difficulty swallowing or speaking  Respiratory: No cough, shortness of breath, cyanosis  Cardiovascular: Negative for chest pain syncope or palpitation  Gastrointestinal: No constipation, nausea, vomiting  Neurological: Negative for headache, positive for: Paresthesia and weakness  Psychological: Negative for severe or debilitating anxiety, depression. Negative memory loss  Musculoskeletal: Positive for arthralgia, myalgia, pain and spasm  Endocrine: Negative for weight gain, appetite changes, excessive sweating  Allergy/immune: Negative    All 13 systems were reviewed and are within normal levels except as noted or in the history of present illness.  Positive or pertinent negative responses are noted or were in the history of present illness. As noted, the patient denies significant or impairing weakness in the bilateral upper and lower extremities, medication induced constipation, and bowel or bladder incontinence.     Current Outpatient Medications:     alendronate (Fosamax) 70 mg tablet, Take 1 tablet (70 mg) by mouth every 7 days. Take in the morning with a full glass of water, on an empty stomach, and do not take anything else by mouth or lie down for the next 30 min., Disp: , Rfl:     amLODIPine (Norvasc) 10 mg tablet, Take 1 tablet (10 mg) by mouth once daily., Disp: , Rfl:     biotin 1 mg tablet, Take 1 tablet (1 mg) by mouth once daily., Disp: , Rfl:     Bystolic  10 mg tablet, Take 1 tablet (10 mg) by mouth once daily., Disp: , Rfl:     cholecalciferol (Vitamin D-3) 50 mcg (2,000 unit) capsule, Take by mouth., Disp: , Rfl:     DULoxetine (Cymbalta) 30 mg DR capsule, Take 1 capsule (30 mg) by mouth once daily. Do not crush or chew., Disp: 30 capsule, Rfl: 2    naloxone (Narcan) 4 mg/0.1 mL nasal spray, Administer into affected nostril(s)., Disp: , Rfl:     omeprazole (PriLOSEC) 40 mg DR capsule, Take 1 capsule (40 mg) by mouth once daily., Disp: , Rfl:     ondansetron (Zofran) 4 mg tablet, Take 1 tablet (4 mg) by mouth every 8 hours if needed for nausea or vomiting., Disp: , Rfl:     rosuvastatin (Crestor) 40 mg tablet, Take 1 tablet (40 mg) by mouth once daily., Disp: , Rfl:     spironolactone (Aldactone) 25 mg tablet, Take 1 tablet (25 mg) by mouth once daily., Disp: , Rfl:      Past Medical History:   Diagnosis Date    Essential (primary) hypertension     Hypertension, well controlled        No past surgical history on file.     No family history on file.     Allergies   Allergen Reactions    Adhesive Tape-Silicones Unknown    Iodinated Contrast Media Unknown    Iodine Unknown    Latex Unknown    Metronidazole Unknown    Niacin Unknown    Sulfamethoxazole Unknown    Sulfites Unknown        Objective     Visit Vitals  Smoking Status Former        Physical Exam    PE:  General: Well-developed, well-nourished, no acute distress. The patient demonstrates no pain behavior, symptom magnification or overt drug-seeking behavior.  Eye: Pupils appropriate for room lighting  Neck/thyroid: No obvious goiter or enlargement of neck noted  Respiratory exam: Normal respiratory effort, unlabored respiration. No accessory muscle use noted  Cardiac exam: Bilateral radial pulses intact  Abdominal: Nondistended  Spine, thoracic: Minimal tenderness paraspinous musculature thoracic region.  No tenderness over thoracic spine.  No gross step-offs noted.  Spine, lumbar: The patient is able to rise  from a seated to standing position with hesitancy.  Gait is slow and deliberate.  Favoring right lower extremity.  Posture remains forward leaning.  Tenderness to paraspinous muscular to her lower lumbar spine.  Flexion intact with extension increasing pain across her low back.  Positive straight leg raise right lower extremity.   Neurologic exam: Muscle strength is antigravity in all 4 extremities.  Psychiatric exam: Judgment and insight normal, affect normal, speech is fluent, affect appropriate, demonstrating no signs of hypersomnolence, sedation, or confusion          Assessment/Plan   Problem List Items Addressed This Visit             ICD-10-CM    Lumbar postlaminectomy syndrome - Primary M96.1     83-year-old female with symptoms consistent with postlaminectomy syndrome presents for follow up.  Experiencing persistent lumbar radicular symptoms.  Lumbar pain radiating to bilateral lower extremities accompanied by numbness/tingling weakness.  Denies any changes in bowel/bladder function.  Patient does not feel that her spinal cord stimulator is capturing her lower extremities appropriately.  She has not reached out to the [employer] representative.  I do think it would be beneficial to have her spinal cord stimulator interrogated and reprogrammed to provide additional pain relief.  We will arrange for the [employer] representative to be at the patient's next office visit to interrogate her stimulator.  Reviewed the patient's MRI findings and current presentation with Dr. Luo who suggested she may benefit from retrying a caudal epidural steroid injection targeting lumbar postlaminectomy symptoms.  Reviewed risks and benefits with patient and her  and the patient would like to proceed with this injection.  As far as medication I explained to the patient that I cannot increase her dose of hydrocodone if she is endorsing minimal relief with her current dose and also not utilizing the  medication 2 times per day as prescribed.  Recommended that the patient try taking her Norco 2 times daily consistently to see if it provides additional pain relief.  If she fails to receive relief with taking Norco 2 times daily consistently, we could wean her off this medication due to potential tolerance.  At that point she may benefit from a different opiate such as a low-dose of oxycodone.  Reminded the patient in order to control her pain effectively we must utilize a multimodal approach and not just rely on medication.  Unfortunately the patient has failed a trial of buprenorphine which could have provided more sustained pain relief.  She also failed a trial of gabapentin.  Advised the patient to restart duloxetine 30 mg daily as this was most likely providing her relief of neuropathic/radicular symptoms.  Patient is also unsure if she is taking nortriptyline which could have provided benefit for neuropathic pain as well.  Advised patient to check her medication at home and notify our office if she is taking either duloxetine or nortriptyline.  At that time I am happy to provide a refill.  She also may call for refill of hydrocodone when needed.  Recommended she try to continue her home physical therapy exercises and stretches consistently.  Plan reviewed with patient at today's office visit.    -For a caudal epidural steroid injection targeting symptoms consistent with lumbar neuritis/lumbar postlaminectomy syndrome.  Reviewed risks and benefits with patient and she would like to proceed.  -Continue hydrocodone 5 mg up to 2 times per day as needed for pain.  Advised patient to try to take this medication 2 times per day as prescribed consistently for several weeks to see if it will provide additional pain relief.  If she fails to receive relief with this medication we may wean her off this medication due to potential tolerance.  She may benefit from rotation to a different opiates such as a low-dose of  oxycodone.   -Restart duloxetine 30 mg daily.  May consider increasing this dose in the future if patient feels it is beneficial.  -Patient will check to see if she is taking nortriptyline 10 mg at bedtime.  Advised patient that this medication could also provide additional neuropathic pain relief.  -We will contact the [employer] representative and ask her to meet the patient at her next office visit to interrogate her stimulator and potentially reprogram the stimulator to provide additional pain relief.  -Encourage patient to continue home therapy exercises and stretches consistently.  -Patient will follow-up approximately 4 weeks after her injection for reevaluation.  MEDICAL DECISION MAKING:    My impressions and treatment recommendations were discussed in detail with the patient, who verbalized understanding and had no further questions prior to discharge. Given the patient's report of reduced pain and improved functional ability without adverse effects,  it is reasonable to continue hydrocodone 5mg up to 2 times per day as needed. The terms of the opioid agreement as well as the potential risks and adverse effects of the patient's medication regimen were discussed in detail. This includes if applicable due to dosage of medication permission to discuss and coordinate care with other treatment providers relevant to the patients condition. The patient verbalized understanding.    Treatment goals were discussed in detail with the patient.  These goals include reduction of pain levels, improved levels of functioning, avoidance of medication side effect and lowest medication dose possible to achieve  these goals.  The patient was in full agreement with these goals.  Also discussed is the understanding that pain may not be eliminated by medication but that the goal of a better sustaining life through use of medication is appropriate.  Lifestyle modifications including weight management, stretching, diet,  exercise and smoking are addressed at each office visit.  The patient provided a urine drug screen for routine monitoring and compliance.  This test may be given as frequently as every month based on the patient's individual opiate risk stratification and prescriber concerns for any aberrancies.  This test is indicated given the use of controlled substances for the patient's medical condition.  Unless otherwise noted the prior (s) urine drug testing results were consistent with prescribed medications.  There is no evidence of illicit drug use or additional medications ingested.     Risks and side effects of chronic opioid therapy including but not limited to tolerance, dependence, constipation, hyperalgesia, cognitive side effects, addiction and possible death due to overuse and or misuse were discussed. I also discussed that such medications when co-administered with other sedative agents including but not limited to alcohol, benzodiazepines, sedative hypnotics and illegal drugs could pose life threatening consequences including death.  I also explained the impact that the administration of such medication has on a patient with obstructive sleep apnea and continued recommendations for use of apnea devices if ordered are prescribed by other physicians.  In order to effectively and safely treat the pain, I also emphasized the importance of compliance with the treatment plan as well as compliance with the terms of the opioid agreement which was reviewed in detail. I explained the importance of being responsible with the medications and to take these only as prescribed, never in excess and never for reasons other than pain reduction. The patient was counseled on keeping the medications safe and locked away from children and other adults as well as disposal methods and options. The patient understood the risks and instructions.      I also discussed with the patient in detail that based on the clinical response to the  opioid medications and improvements of activities of daily living, sleep, and work performance in light of compliance with the treatment plan we can continue this form of therapy for the above chronic  pain.  The goal and rationale used for current treatment with chronic opioid medication is to control the pain and alleviate disability induced by the chronic pain condition noted above after failures of other non-opioid and nonpharmacological modalities to treat the chronic pain and the symptoms associated with have failed.  The patient understood the goals in terms of the above treatment plan and had no further questions prior to leaving the office today.    Given the patient's total MED, general use of daily opiates, or other coadministered medications in various classes the patient was offered a prescription for Narcan.  I instructed the patient that Narcan is for emergency use only and is worse suspected or known opiate overdose.  Call 911 prior to administration of this medication to activate the emergency response team.  It is imperative to fill this medication in order to demonstrate understanding of the gravity of possible side effects including respiratory depression and risk of overdose of this opiate load or medication combination.  As such patients will be required to bring Narcan prescriptions to follow-up appointments as part of the compliance with continued opiate care.    Disclaimer: This note was transcribed using an audio transcription device.  As such, minor errors may be present with regard to spelling, punctuation, and inadvertent word insertion.  Please disregard such errors.             Relevant Orders    [place] pain management    Epidural Steroid Injection    Myofascial pain M79.18

## 2024-07-15 NOTE — ASSESSMENT & PLAN NOTE
83-year-old female with symptoms consistent with postlaminectomy syndrome presents for follow up.  Experiencing persistent lumbar radicular symptoms.  Lumbar pain radiating to bilateral lower extremities accompanied by numbness/tingling weakness.  Denies any changes in bowel/bladder function.  Patient does not feel that her spinal cord stimulator is capturing her lower extremities appropriately.  She has not reached out to the [employer] representative.  I do think it would be beneficial to have her spinal cord stimulator interrogated and reprogrammed to provide additional pain relief.  We will arrange for the [employer] representative to be at the patient's next office visit to interrogate her stimulator.  Reviewed the patient's MRI findings and current presentation with Dr. Luo who suggested she may benefit from retrying a caudal epidural steroid injection targeting lumbar postlaminectomy symptoms.  Reviewed risks and benefits with patient and her  and the patient would like to proceed with this injection.  As far as medication I explained to the patient that I cannot increase her dose of hydrocodone if she is endorsing minimal relief with her current dose and also not utilizing the medication 2 times per day as prescribed.  Recommended that the patient try taking her Norco 2 times daily consistently to see if it provides additional pain relief.  If she fails to receive relief with taking Norco 2 times daily consistently, we could wean her off this medication due to potential tolerance.  At that point she may benefit from a different opiate such as a low-dose of oxycodone.  Reminded the patient in order to control her pain effectively we must utilize a multimodal approach and not just rely on medication.  Unfortunately the patient has failed a trial of buprenorphine which could have provided more sustained pain relief.  She also failed a trial of gabapentin.  Advised the patient to restart  duloxetine 30 mg daily as this was most likely providing her relief of neuropathic/radicular symptoms.  Patient is also unsure if she is taking nortriptyline which could have provided benefit for neuropathic pain as well.  Advised patient to check her medication at home and notify our office if she is taking either duloxetine or nortriptyline.  At that time I am happy to provide a refill.  She also may call for refill of hydrocodone when needed.  Recommended she try to continue her home physical therapy exercises and stretches consistently.  Plan reviewed with patient at today's office visit.    -For a caudal epidural steroid injection targeting symptoms consistent with lumbar neuritis/lumbar postlaminectomy syndrome.  Reviewed risks and benefits with patient and she would like to proceed.  -Continue hydrocodone 5 mg up to 2 times per day as needed for pain.  Advised patient to try to take this medication 2 times per day as prescribed consistently for several weeks to see if it will provide additional pain relief.  If she fails to receive relief with this medication we may wean her off this medication due to potential tolerance.  She may benefit from rotation to a different opiates such as a low-dose of oxycodone.   -Restart duloxetine 30 mg daily.  May consider increasing this dose in the future if patient feels it is beneficial.  -Patient will check to see if she is taking nortriptyline 10 mg at bedtime.  Advised patient that this medication could also provide additional neuropathic pain relief.  -We will contact the [employer] representative and ask her to meet the patient at her next office visit to interrogate her stimulator and potentially reprogram the stimulator to provide additional pain relief.  -Encourage patient to continue home therapy exercises and stretches consistently.  -Patient will follow-up approximately 4 weeks after her injection for reevaluation.  MEDICAL DECISION MAKING:    My  impressions and treatment recommendations were discussed in detail with the patient, who verbalized understanding and had no further questions prior to discharge. Given the patient's report of reduced pain and improved functional ability without adverse effects,  it is reasonable to continue hydrocodone 5mg up to 2 times per day as needed. The terms of the opioid agreement as well as the potential risks and adverse effects of the patient's medication regimen were discussed in detail. This includes if applicable due to dosage of medication permission to discuss and coordinate care with other treatment providers relevant to the patients condition. The patient verbalized understanding.    Treatment goals were discussed in detail with the patient.  These goals include reduction of pain levels, improved levels of functioning, avoidance of medication side effect and lowest medication dose possible to achieve  these goals.  The patient was in full agreement with these goals.  Also discussed is the understanding that pain may not be eliminated by medication but that the goal of a better sustaining life through use of medication is appropriate.  Lifestyle modifications including weight management, stretching, diet, exercise and smoking are addressed at each office visit.  The patient provided a urine drug screen for routine monitoring and compliance.  This test may be given as frequently as every month based on the patient's individual opiate risk stratification and prescriber concerns for any aberrancies.  This test is indicated given the use of controlled substances for the patient's medical condition.  Unless otherwise noted the prior (s) urine drug testing results were consistent with prescribed medications.  There is no evidence of illicit drug use or additional medications ingested.     Risks and side effects of chronic opioid therapy including but not limited to tolerance, dependence, constipation, hyperalgesia,  cognitive side effects, addiction and possible death due to overuse and or misuse were discussed. I also discussed that such medications when co-administered with other sedative agents including but not limited to alcohol, benzodiazepines, sedative hypnotics and illegal drugs could pose life threatening consequences including death.  I also explained the impact that the administration of such medication has on a patient with obstructive sleep apnea and continued recommendations for use of apnea devices if ordered are prescribed by other physicians.  In order to effectively and safely treat the pain, I also emphasized the importance of compliance with the treatment plan as well as compliance with the terms of the opioid agreement which was reviewed in detail. I explained the importance of being responsible with the medications and to take these only as prescribed, never in excess and never for reasons other than pain reduction. The patient was counseled on keeping the medications safe and locked away from children and other adults as well as disposal methods and options. The patient understood the risks and instructions.      I also discussed with the patient in detail that based on the clinical response to the opioid medications and improvements of activities of daily living, sleep, and work performance in light of compliance with the treatment plan we can continue this form of therapy for the above chronic  pain.  The goal and rationale used for current treatment with chronic opioid medication is to control the pain and alleviate disability induced by the chronic pain condition noted above after failures of other non-opioid and nonpharmacological modalities to treat the chronic pain and the symptoms associated with have failed.  The patient understood the goals in terms of the above treatment plan and had no further questions prior to leaving the office today.    Given the patient's total MED, general use of daily  opiates, or other coadministered medications in various classes the patient was offered a prescription for Narcan.  I instructed the patient that Narcan is for emergency use only and is worse suspected or known opiate overdose.  Call 911 prior to administration of this medication to activate the emergency response team.  It is imperative to fill this medication in order to demonstrate understanding of the gravity of possible side effects including respiratory depression and risk of overdose of this opiate load or medication combination.  As such patients will be required to bring Narcan prescriptions to follow-up appointments as part of the compliance with continued opiate care.    Disclaimer: This note was transcribed using an audio transcription device.  As such, minor errors may be present with regard to spelling, punctuation, and inadvertent word insertion.  Please disregard such errors.

## 2024-07-25 DIAGNOSIS — M96.1 LUMBAR POSTLAMINECTOMY SYNDROME: Primary | ICD-10-CM

## 2024-07-25 NOTE — TELEPHONE ENCOUNTER
Pt is requesting refill of   Norco 5-325 mg 1 tablet po BID                                                         LV:  7/15/24                  NV:  8/29/24                [place]ARRS reviewed with [facility]:   6/11/24 #60/30 days                         Pended RX to [facility] for transmission to pharmacy.    Is This A New Presentation, Or A Follow-Up?: Skin Lesions

## 2024-07-26 RX ORDER — HYDROCODONE BITARTRATE AND ACETAMINOPHEN 5; 325 MG/1; MG/1
1 TABLET ORAL 2 TIMES DAILY PRN
Qty: 60 TABLET | Refills: 0 | Status: SHIPPED | OUTPATIENT
Start: 2024-07-26 | End: 2024-08-25

## 2024-08-02 ENCOUNTER — HOSPITAL ENCOUNTER (OUTPATIENT)
Dept: GASTROENTEROLOGY | Facility: HOSPITAL | Age: 84
Discharge: HOME | End: 2024-08-02
Payer: MEDICARE

## 2024-08-02 ENCOUNTER — HOSPITAL ENCOUNTER (OUTPATIENT)
Dept: RADIOLOGY | Facility: HOSPITAL | Age: 84
Discharge: HOME | End: 2024-08-02
Payer: MEDICARE

## 2024-08-02 VITALS
SYSTOLIC BLOOD PRESSURE: 155 MMHG | OXYGEN SATURATION: 97 % | HEART RATE: 74 BPM | DIASTOLIC BLOOD PRESSURE: 60 MMHG | RESPIRATION RATE: 16 BRPM | TEMPERATURE: 97 F

## 2024-08-02 DIAGNOSIS — M96.1 LUMBAR POSTLAMINECTOMY SYNDROME: ICD-10-CM

## 2024-08-02 PROCEDURE — 62323 NJX INTERLAMINAR LMBR/SAC: CPT | Performed by: PHYSICAL MEDICINE & REHABILITATION

## 2024-08-02 PROCEDURE — 2500000004 HC RX 250 GENERAL PHARMACY W/ HCPCS (ALT 636 FOR OP/ED): Performed by: PHYSICAL MEDICINE & REHABILITATION

## 2024-08-02 PROCEDURE — 2500000005 HC RX 250 GENERAL PHARMACY W/O HCPCS: Performed by: PHYSICAL MEDICINE & REHABILITATION

## 2024-08-02 RX ORDER — DEXAMETHASONE SODIUM PHOSPHATE 10 MG/ML
INJECTION INTRAMUSCULAR; INTRAVENOUS AS NEEDED
Status: DISCONTINUED | OUTPATIENT
Start: 2024-08-02 | End: 2024-08-03 | Stop reason: HOSPADM

## 2024-08-02 RX ORDER — LIDOCAINE HYDROCHLORIDE 5 MG/ML
INJECTION, SOLUTION INFILTRATION; INTRAVENOUS AS NEEDED
Status: DISCONTINUED | OUTPATIENT
Start: 2024-08-02 | End: 2024-08-03 | Stop reason: HOSPADM

## 2024-08-02 ASSESSMENT — COLUMBIA-SUICIDE SEVERITY RATING SCALE - C-SSRS
2. HAVE YOU ACTUALLY HAD ANY THOUGHTS OF KILLING YOURSELF?: NO
1. IN THE PAST MONTH, HAVE YOU WISHED YOU WERE DEAD OR WISHED YOU COULD GO TO SLEEP AND NOT WAKE UP?: NO
6. HAVE YOU EVER DONE ANYTHING, STARTED TO DO ANYTHING, OR PREPARED TO DO ANYTHING TO END YOUR LIFE?: NO

## 2024-08-02 ASSESSMENT — PAIN SCALES - GENERAL
PAINLEVEL_OUTOF10: 5 - MODERATE PAIN
PAINLEVEL_OUTOF10: 4

## 2024-08-02 ASSESSMENT — ENCOUNTER SYMPTOMS
OCCASIONAL FEELINGS OF UNSTEADINESS: 0
DEPRESSION: 0
LOSS OF SENSATION IN FEET: 0

## 2024-08-02 ASSESSMENT — PAIN - FUNCTIONAL ASSESSMENT
PAIN_FUNCTIONAL_ASSESSMENT: 0-10
PAIN_FUNCTIONAL_ASSESSMENT: 0-10

## 2024-08-02 NOTE — DISCHARGE INSTRUCTIONS
You had a pain management procedure today.    Observe/ monitor for the following signs & symptoms:  If you notice Excessive bleeding (slow general oozing that completely soaks the dressing, or fresh bright red bleeding).   In either case, apply pressure to the area, elevate it if possible & call your doctor at once.    Also observe for:  Change in color  Numbness/tingling  Coldness to the touch  Swelling  Drainage  Temperature of 101.5 or higher.  Increased, uncontrollable pain.    *If you notice the above signs & symptoms, please call your doctor right away!*      Discharge Instructions:    Your pain may not be gone immediately after this procedure; it generally takes 3 to 5 days for the steroid to work.   Keep the needle site clean & dry for 24 hours.  Continue your present medications.  Make an appointment to see your doctor in 2-3 weeks.  If any problems occur, or if you have any further questions, please call as soon as possible. If you find that you cannot reach your doctor, but feel that the condition nees a doctor's attention, go to the closest emergency department & take this discharge paper with you.       Dr. Rodriguez's Office: [phone]         
Alert and oriented to person, place and time

## 2024-08-11 PROBLEM — R00.2 PALPITATIONS: Status: ACTIVE | Noted: 2024-08-11

## 2024-08-11 PROBLEM — I49.3 PVC (PREMATURE VENTRICULAR CONTRACTION): Status: ACTIVE | Noted: 2024-08-11

## 2024-08-11 PROBLEM — E66.9 OBESITY (BMI 30-39.9): Status: ACTIVE | Noted: 2024-08-11

## 2024-08-11 NOTE — PROGRESS NOTES
[facility] Heart and Vascular Cardiology    Patient Name: Janelle Jung  Patient : 1940      Scribe Attestation  By signing my name below, ILeslye Scribe   attest that this documentation has been prepared under the direction and in the presence of Paulo Galicia DO.      Reason for visit:  This is an 84-year-old female here for follow-up regarding chest pain, palpitations/PVCs, hypertension, dyslipidemia.     HPI:  This is an 84-year-old female here for follow-up regarding chest pain, palpitations/PVCs, hypertension, dyslipidemia.  The patient was last seen by me during her hospitalization in 2024 where she had presented with chest pain.  BMP done 2024 showed normal serum sodium and potassium with a serum creatinine 0.73, CBC showed a hemoglobin of 12.4, TSH was 0.68.  BNP done 7/10/2024 was 673.  Troponin was 6-7.  Nuclear stress done 2024 showed low probability of ischemia with a calculated ejection fraction of 70%.  Lipid panel done in May 2024 showed an LDL cholesterol of 68 and triglycerides of 109 while on rosuvastatin 40 mg daily.  Echocardiogram done 2024 showed normal left ventricular systolic function with an ejection fraction of 60 to 65%, normal right ventricular systolic function, no significant valve abnormalities.  Chest x-ray done 7/10/2024 showed mild cardiomegaly and/or pericardial effusion, no focal pulmonary consolidation. ECG done today showed sinus rhythm with a heart rate of 68 bpm, and PVCs. The patient reports that she has been feeling generally better from the cardiac standpoint since her recent hospitalization. She denies any new chest pain, shortness of breath, palpitations and lightheadedness. She states that she takes all of her medications as prescribed. During my exam, she was resting comfortably on the exam table.            Assessment/Plan:   Chest pain  The patient had a hospitalization in 2024 where she had presented with  chest pain.  Patient has not had any additional chest pain since her hospitalization.  ECG done today showed  sinus rhythm with a heart rate of 68 bpm, and PVCs.   She denies anginal chest discomfort.  Blood pressure appears controlled on exam today.  She should continue her current antihypertensive medications.  Echocardiogram done 7/11/2024 showed normal left ventricular systolic function with an ejection fraction of 60 to 65%, normal right ventricular systolic function, no significant valve abnormalities.    Nuclear stress done 7/11/2024 showed low probability of ischemia with a calculated ejection fraction of 70%.    Chest x-ray done 7/10/2024 showed mild cardiomegaly and/or pericardial effusion, no focal pulmonary consolidation.   Recent lab works as noted in the HPI.  Lab works as noted below will be done in 6 months prior to his next visit.  Continue risk factor modification.  Follow up in 6 months and sooner if necessary.     2. Palpitations/PVCs  The patient has a history of palpitations/PVCs.  ECG done today showed sinus rhythm with a heart rate of 68 bpm, and PVCs.   She denies chest pain, palpitations or lightheadedness.  Echocardiogram done 7/11/2024 showed normal left ventricular systolic function with an ejection fraction of 60 to 65%, normal right ventricular systolic function, no significant valve abnormalities.    Recent lab works as noted in the HPI.  Lab works as noted below will be done in 6 months prior to his next visit.  Patient should follow general recommendations including avoiding excessive alcohol intake, avoiding excessive caffeine intake, staying well-hydrated, getting an appropriate amount of sleep.  Follow up in 6 months and sooner if necessary.     3. Hypertension  The patient has a history of hypertension which appears controlled on exam today.  He should continue his current antihypertensive medications and monitor his blood pressure at home.    4. Dyslipidemia  Lipid panel done  in May 2024 showed an LDL cholesterol of 68 and triglycerides of 109 while on rosuvastatin 40 mg daily.   Please see lifestyle recommendations below.    5. Obesity  Please see lifestyle recommendations below.        Orders:   BMP/magnesium in 6 months,   Follow-up in 6 months.    Lifestyle Recommendations  I recommend a whole-food plant-based diet, an eating pattern that encourages the consumption of unrefined plant foods (such as fruits, vegetables, tubers, whole grains, legumes, nuts and seeds) and discourages meats, dairy products, eggs and processed foods.     The AHA/ACC recommends that the patient consume a dietary pattern that emphasizes intake of vegetables, fruits, and whole grains; includes low-fat dairy products, poultry, fish, legumes, non-tropical vegetable oils, and nuts; and limits intake of sodium, sweets, sugar-sweetened beverages, and red meats.  Adapt this dietary pattern to appropriate calorie requirements (a 500-750 kcal/day deficit to loose weight), personal and cultural food preferences, and nutrition therapy for other medical conditions (including diabetes).  Achieve this pattern by following plans such as the [place] Mediterranean, DASH dietary pattern, or AHA diet.     Engage in 2 hours and 30 minutes per week of moderate-intensity physical activity, or 1 hour and 15 minutes (75 minutes) per week of vigorous-intensity aerobic physical activity, or an equivalent combination of moderate and vigorous-intensity aerobic physical activity. Aerobic activity should be performed in episodes of at least 10 minutes preferably spread throughout the week.     Adhering to a heart healthy diet, regular exercise habits, avoidance of tobacco products, and maintenance of a healthy weight are crucial components of their heart disease risk reduction.     Any positive review of systems not specifically addressed in the office visit today should be evaluated and treated by the patients primary care physician or in  an emergency department if necessary     Patient was notified that results from ordered tests will be called to the patient if it changes current management; it will otherwise be discussed at a future appointment and available on [facility].     Thank you for allowing me to participate in the care of this patient.        This document was generated using the assistance of voice recognition software. If there are any errors of spelling, grammar, syntax, or meaning; please feel free to contact me directly for clarification.      Past Medical History:  She has a past medical history of Essential (primary) hypertension.    Past Surgical History:  She has no past surgical history on file.      Social History:  She reports that she has quit smoking. Her smoking use included cigarettes. She has never used smokeless tobacco. She reports that she does not use drugs. No history on file for alcohol use.    Family History:  No family history on file.     Allergies:  Adhesive tape-silicones, Iodinated contrast media, Iodine, Latex, Metronidazole, Niacin, Sulfamethoxazole, and Sulfites    Outpatient Medications:  Current Outpatient Medications   Medication Instructions    alendronate (FOSAMAX) 70 mg, oral, Every 7 days, Take in the morning with a full glass of water, on an empty stomach, and do not take anything else by mouth or lie down for the next 30 min.    amLODIPine (Norvasc) 10 mg tablet 1 tablet, oral, Daily    biotin 1 mg tablet 1 tablet, oral, Daily    Bystolic 10 mg tablet 1 tablet, oral, Daily    cholecalciferol (Vitamin D-3) 50 mcg (2,000 unit) capsule oral    DULoxetine (CYMBALTA) 30 mg, oral, Daily, Do not crush or chew.    HYDROcodone-acetaminophen (Norco) 5-325 mg tablet 1 tablet, oral, 2 times daily PRN    naloxone (Narcan) 4 mg/0.1 mL nasal spray nasal    omeprazole (PRILOSEC) 40 mg, oral, Daily    ondansetron (ZOFRAN) 4 mg, oral, Every 8 hours PRN    rosuvastatin (CRESTOR) 40 mg, oral, Daily    spironolactone  "(Aldactone) 25 mg tablet 1 tablet, oral, Daily        ROS:  A 14 point review of systems was done and is negative other than as stated in HPI    Vitals:      7/11/2024     5:44 AM 7/11/2024     9:37 AM 7/11/2024     2:04 PM 7/15/2024     3:02 PM 8/2/2024    11:14 AM 8/2/2024    11:50 AM 8/2/2024    11:53 AM   Vitals   Systolic 132  133 132 166 158 155   Diastolic 61  74 66 79 61 60   Heart Rate 60  68 58 82 64 74   Temp 35.8 °C (96.4 °F) -- 36.7 °C (98.1 °F)  36.1 °C (97 °F)     Resp 18  16 16 16 18 16   Height (in)    1.575 m (5' 2\")      Weight (lb)    165      BMI    30.18 kg/m2      BSA (m2)    1.81 m2           Physical Exam:   Constitutional: Cooperative, in no acute distress, alert, appears stated age.  Skin: Skin color, texture, turgor normal. No rashes or lesions.  Head: Normocephalic. No masses, lesions, tenderness or abnormalities  Eyes: Extraocular movements are grossly intact.  Mouth and throat: Mucous membranes moist  Neck: Neck supple, no carotid bruits, no JVD  Respiratory: Lungs clear to auscultation, no wheezing or rhonchi, no use of accessory muscles  Chest wall: No scars, normal excursion with respiration  Cardiovascular: Regular rhythm without murmur  Gastrointestinal: Abdomen soft, nontender. Bowel sounds normal.  Musculoskeletal: Strength equal in upper extremities  Extremities: Trivial pitting edema  Neurologic: Sensation grossly intact, alert and oriented ×3    Intake/Output:   No intake/output data recorded.    Outpatient Medications  Current Outpatient Medications on File Prior to Visit   Medication Sig Dispense Refill    alendronate (Fosamax) 70 mg tablet Take 1 tablet (70 mg) by mouth every 7 days. Take in the morning with a full glass of water, on an empty stomach, and do not take anything else by mouth or lie down for the next 30 min.      amLODIPine (Norvasc) 10 mg tablet Take 1 tablet (10 mg) by mouth once daily.      biotin 1 mg tablet Take 1 tablet (1 mg) by mouth once daily.      " Bystolic 10 mg tablet Take 1 tablet (10 mg) by mouth once daily.      cholecalciferol (Vitamin D-3) 50 mcg (2,000 unit) capsule Take by mouth.      DULoxetine (Cymbalta) 30 mg DR capsule Take 1 capsule (30 mg) by mouth once daily. Do not crush or chew. 30 capsule 2    HYDROcodone-acetaminophen (Norco) 5-325 mg tablet Take 1 tablet by mouth 2 times a day as needed for severe pain (7 - 10). 60 tablet 0    naloxone (Narcan) 4 mg/0.1 mL nasal spray Administer into affected nostril(s).      omeprazole (PriLOSEC) 40 mg DR capsule Take 1 capsule (40 mg) by mouth once daily.      ondansetron (Zofran) 4 mg tablet Take 1 tablet (4 mg) by mouth every 8 hours if needed for nausea or vomiting.      rosuvastatin (Crestor) 40 mg tablet Take 1 tablet (40 mg) by mouth once daily.      spironolactone (Aldactone) 25 mg tablet Take 1 tablet (25 mg) by mouth once daily.       No current facility-administered medications on file prior to visit.       Labs: (past 26 weeks)  Recent Results (from the past 4368 hour(s))   Buprenorphine Confirm,Urine    Collection Time: 04/24/24  4:18 PM   Result Value Ref Range    BUPRENORPHINE GLUC, URINE <5 ng/mL    BUPRENORPHINE ,URINE <2 ng/mL    NALOXONE, URINE <100 ng/mL    NORBUPRENORPHINE GLUC,URINE <5 ng/mL    NORBUPRENORPHINE, URINE <2 ng/mL   Tapentadol Confirmation, Urine    Collection Time: 04/24/24  4:18 PM   Result Value Ref Range    Tapentadol <25 <25 ng/mL    N-Desmethyltapentadol <25 <25 ng/mL   Alcohol, Urine    Collection Time: 04/24/24  4:18 PM   Result Value Ref Range    Alcohol, Urine <10 <20 mg/dL   Screen Opiate/Opioid/Benzo Prescription Compliance    Collection Time: 04/24/24  4:18 PM   Result Value Ref Range    Creatinine, Urine Random 242.8 20.0 - 320.0 mg/dL    Amphetamine Screen, Urine Presumptive Negative Presumptive Negative    Barbiturate Screen, Urine Presumptive Negative Presumptive Negative    Cannabinoid Screen, Urine Presumptive Negative Presumptive Negative    Cocaine  Metabolite Screen, Urine Presumptive Negative Presumptive Negative    PCP Screen, Urine Presumptive Negative Presumptive Negative   Confirmation Opiate/Opioid/Benzo Prescription Compliance    Collection Time: 04/24/24  4:18 PM   Result Value Ref Range    Clonazepam <25 <25 ng/mL    7-Aminoclonazepam <25 <25 ng/mL    Alprazolam <25 <25 ng/mL    Alpha-Hydroxyalprazolam <25 <25 ng/mL    Midazolam <25 <25 ng/mL    Alpha-Hydroxymidazolam <25 <25 ng/mL    Chlordiazepoxide <25 <25 ng/mL    Diazepam <25 <25 ng/mL    Nordiazepam <25 <25 ng/mL    Temazepam <25 <25 ng/mL    Oxazepam <25 <25 ng/mL    Lorazepam <25 <25 ng/mL    Methadone <25 <25 ng/mL    EDDP <25 <25 ng/mL    6-Acetylmorphine <25 <25 ng/mL    Codeine <50 <50 ng/mL    Hydrocodone >2,500 (H) <25 ng/mL    Hydromorphone 658 (H) <25 ng/mL    Morphine  <50 <50 ng/mL    Norhydrocodone >1,000 (H) <25 ng/mL    Noroxycodone <25 <25 ng/mL    Oxycodone <25 <25 ng/mL    Oxymorphone <25 <25 ng/mL    Fentanyl <2.5 <2.5 ng/mL    Norfentanyl <2.5 <2.5 ng/mL    Tramadol <50 <50 ng/mL    O-Desmethyltramadol <50 <50 ng/mL    Zolpidem <25 <25 ng/mL    Zolpidem Metabolite (ZCA) <25 <25 ng/mL   Alanine Aminotransferase    Collection Time: 05/24/24  1:39 PM   Result Value Ref Range    ALT 10 7 - 45 U/L   Lipid Panel    Collection Time: 05/24/24  1:39 PM   Result Value Ref Range    Cholesterol 139 0 - 199 mg/dL    HDL-Cholesterol 49.2 mg/dL    Cholesterol/HDL Ratio 2.8     LDL Calculated 68 <=99 mg/dL    VLDL 22 0 - 40 mg/dL    Triglycerides 109 0 - 149 mg/dL    Non HDL Cholesterol 90 0 - 149 mg/dL   Comprehensive Metabolic Panel    Collection Time: 07/09/24  3:14 PM   Result Value Ref Range    Glucose 91 74 - 99 mg/dL    Sodium 140 136 - 145 mmol/L    Potassium 4.4 3.5 - 5.3 mmol/L    Chloride 108 (H) 98 - 107 mmol/L    Bicarbonate 25 21 - 32 mmol/L    Anion Gap 11 10 - 20 mmol/L    Urea Nitrogen 17 6 - 23 mg/dL    Creatinine 0.91 0.50 - 1.05 mg/dL    eGFR 62 >60 mL/min/1.73m*2     Calcium 9.7 8.6 - 10.3 mg/dL    Albumin 4.2 3.4 - 5.0 g/dL    Alkaline Phosphatase 64 33 - 136 U/L    Total Protein 6.2 (L) 6.4 - 8.2 g/dL    AST 14 9 - 39 U/L    Bilirubin, Total 0.5 0.0 - 1.2 mg/dL    ALT 9 7 - 45 U/L   Vitamin B12    Collection Time: 07/09/24  3:14 PM   Result Value Ref Range    Vitamin B12 225 211 - 911 pg/mL   Thyroid Stimulating Hormone    Collection Time: 07/09/24  3:14 PM   Result Value Ref Range    Thyroid Stimulating Hormone 0.71 0.44 - 3.98 mIU/L   Magnesium    Collection Time: 07/09/24  3:14 PM   Result Value Ref Range    Magnesium 2.06 1.60 - 2.40 mg/dL   CBC and Auto Differential    Collection Time: 07/09/24  3:14 PM   Result Value Ref Range    WBC 11.3 4.4 - 11.3 x10*3/uL    nRBC 0.0 0.0 - 0.0 /100 WBCs    RBC 4.65 4.00 - 5.20 x10*6/uL    Hemoglobin 13.4 12.0 - 16.0 g/dL    Hematocrit 41.6 36.0 - 46.0 %    MCV 90 80 - 100 fL    MCH 28.8 26.0 - 34.0 pg    MCHC 32.2 32.0 - 36.0 g/dL    RDW 13.8 11.5 - 14.5 %    Platelets 358 150 - 450 x10*3/uL    Neutrophils % 60.5 40.0 - 80.0 %    Immature Granulocytes %, Automated 0.4 0.0 - 0.9 %    Lymphocytes % 24.3 13.0 - 44.0 %    Monocytes % 11.8 2.0 - 10.0 %    Eosinophils % 2.0 0.0 - 6.0 %    Basophils % 1.0 0.0 - 2.0 %    Neutrophils Absolute 6.86 (H) 1.60 - 5.50 x10*3/uL    Immature Granulocytes Absolute, Automated 0.04 0.00 - 0.50 x10*3/uL    Lymphocytes Absolute 2.76 0.80 - 3.00 x10*3/uL    Monocytes Absolute 1.34 (H) 0.05 - 0.80 x10*3/uL    Eosinophils Absolute 0.23 0.00 - 0.40 x10*3/uL    Basophils Absolute 0.11 (H) 0.00 - 0.10 x10*3/uL   ECG 12 lead    Collection Time: 07/10/24  1:27 PM   Result Value Ref Range    Ventricular Rate 69 BPM    Atrial Rate 69 BPM    ID Interval 176 ms    QRS Duration 94 ms    QT Interval 423 ms    QTC Calculation(Bazett) 454 ms    P Axis 44 degrees    R Axis -18 degrees    T Axis 41 degrees    QRS Count 11 beats    Q Onset 249 ms    T Offset 461 ms    QTC Fredericia 443 ms   Protime-INR    Collection Time:  07/10/24  2:07 PM   Result Value Ref Range    Protime 11.8 9.8 - 12.8 seconds    INR 1.0 0.9 - 1.1   B-Type Natriuretic Peptide    Collection Time: 07/10/24  2:07 PM   Result Value Ref Range     (H) 0 - 99 pg/mL   Magnesium    Collection Time: 07/10/24  2:07 PM   Result Value Ref Range    Magnesium 2.10 1.60 - 2.40 mg/dL   CBC and Auto Differential    Collection Time: 07/10/24  2:07 PM   Result Value Ref Range    WBC 9.6 4.4 - 11.3 x10*3/uL    nRBC 0.0 0.0 - 0.0 /100 WBCs    RBC 4.49 4.00 - 5.20 x10*6/uL    Hemoglobin 13.0 12.0 - 16.0 g/dL    Hematocrit 39.3 36.0 - 46.0 %    MCV 88 80 - 100 fL    MCH 29.0 26.0 - 34.0 pg    MCHC 33.1 32.0 - 36.0 g/dL    RDW 13.5 11.5 - 14.5 %    Platelets 286 150 - 450 x10*3/uL    Neutrophils % 63.1 40.0 - 80.0 %    Immature Granulocytes %, Automated 0.4 0.0 - 0.9 %    Lymphocytes % 21.8 13.0 - 44.0 %    Monocytes % 12.5 2.0 - 10.0 %    Eosinophils % 1.4 0.0 - 6.0 %    Basophils % 0.8 0.0 - 2.0 %    Neutrophils Absolute 6.07 (H) 1.60 - 5.50 x10*3/uL    Immature Granulocytes Absolute, Automated 0.04 0.00 - 0.50 x10*3/uL    Lymphocytes Absolute 2.10 0.80 - 3.00 x10*3/uL    Monocytes Absolute 1.20 (H) 0.05 - 0.80 x10*3/uL    Eosinophils Absolute 0.13 0.00 - 0.40 x10*3/uL    Basophils Absolute 0.08 0.00 - 0.10 x10*3/uL   Comprehensive Metabolic Panel    Collection Time: 07/10/24  2:07 PM   Result Value Ref Range    Glucose 85 74 - 99 mg/dL    Sodium 137 136 - 145 mmol/L    Potassium 4.0 3.5 - 5.3 mmol/L    Chloride 108 (H) 98 - 107 mmol/L    Bicarbonate 20 (L) 21 - 32 mmol/L    Anion Gap 13 10 - 20 mmol/L    Urea Nitrogen 15 6 - 23 mg/dL    Creatinine 0.76 0.50 - 1.05 mg/dL    eGFR 77 >60 mL/min/1.73m*2    Calcium 9.3 8.6 - 10.3 mg/dL    Albumin 4.1 3.4 - 5.0 g/dL    Alkaline Phosphatase 61 33 - 136 U/L    Total Protein 6.5 6.4 - 8.2 g/dL    AST 15 9 - 39 U/L    Bilirubin, Total 0.6 0.0 - 1.2 mg/dL    ALT 8 7 - 45 U/L   Troponin I, High Sensitivity, Initial    Collection Time:  07/10/24  2:07 PM   Result Value Ref Range    Troponin I, High Sensitivity 6 0 - 13 ng/L   Sedimentation rate, automated    Collection Time: 07/10/24  2:07 PM   Result Value Ref Range    Sedimentation Rate 3 0 - 30 mm/h   Troponin, High Sensitivity, 1 Hour    Collection Time: 07/10/24  3:09 PM   Result Value Ref Range    Troponin I, High Sensitivity 7 0 - 13 ng/L   C-reactive protein    Collection Time: 07/10/24  3:09 PM   Result Value Ref Range    C-Reactive Protein <0.10 <1.00 mg/dL   Basic metabolic panel    Collection Time: 07/11/24  4:49 AM   Result Value Ref Range    Glucose 91 74 - 99 mg/dL    Sodium 139 136 - 145 mmol/L    Potassium 4.2 3.5 - 5.3 mmol/L    Chloride 107 98 - 107 mmol/L    Bicarbonate 25 21 - 32 mmol/L    Anion Gap 11 10 - 20 mmol/L    Urea Nitrogen 15 6 - 23 mg/dL    Creatinine 0.73 0.50 - 1.05 mg/dL    eGFR 81 >60 mL/min/1.73m*2    Calcium 8.9 8.6 - 10.3 mg/dL   CBC    Collection Time: 07/11/24  4:49 AM   Result Value Ref Range    WBC 8.5 4.4 - 11.3 x10*3/uL    nRBC 0.0 0.0 - 0.0 /100 WBCs    RBC 4.32 4.00 - 5.20 x10*6/uL    Hemoglobin 12.4 12.0 - 16.0 g/dL    Hematocrit 38.2 36.0 - 46.0 %    MCV 88 80 - 100 fL    MCH 28.7 26.0 - 34.0 pg    MCHC 32.5 32.0 - 36.0 g/dL    RDW 13.6 11.5 - 14.5 %    Platelets 304 150 - 450 x10*3/uL   Transthoracic Echo (TTE) Complete    Collection Time: 07/11/24  9:48 AM   Result Value Ref Range    LVOT diam 2.00 cm    LV Biplane EF 62 %    MV E/A ratio 0.87     Tricuspid annular plane systolic excursion 2.0 cm    AV mn grad 6.0 mmHg    LA vol index A/L 25.3 ml/m2    AV pk evie 1.58 m/s    LV EF 63 %    RV free wall pk S' 14.40 cm/s    RVSP 31.3 mmHg    LVIDd 5.60 cm    Aortic Valve Area by Continuity of VTI 2.51 cm2    Aortic Valve Area by Continuity of Peak Velocity 1.91 cm2    AV pk grad 10.0 mmHg    LV A4C EF 67.0    TSH with reflex to Free T4 if abnormal    Collection Time: 07/11/24  4:02 PM   Result Value Ref Range    Thyroid Stimulating Hormone 0.68 0.44 -  3.98 mIU/L         CV Studies:  EKG:  Encounter Date: 07/10/24   ECG 12 lead   Result Value    Ventricular Rate 69    Atrial Rate 69    LA Interval 176    QRS Duration 94    QT Interval 423    QTC Calculation(Bazett) 454    P Axis 44    R Axis -18    T Axis 41    QRS Count 11    Q Onset 249    T Offset 461    QTC Fredericia 443    Narrative    Sinus rhythm  Multiple ventricular premature complexes  Left atrial enlargement  Inferior infarct, old  Consider anterior infarct    See ED provider note for full interpretation and clinical correlation  Confirmed by Cyndi Quan ([id number]) on 7/14/2024 3:53:12 PM     Echocardiogram: No results found for this or any previous visit from the past 1825 days.    Stress Testing IMGRESULT([id number]): No results found for this or any previous visit from the past 1825 days.    Cardiac Catheterization: No results found for this or any previous visit from the past 1825 days.  No results found for this or any previous visit from the past 3650 days.     Cardiac Scoring: No results found for this or any previous visit from the past 1825 days.    AAA : No results found for this or any previous visit from the past 1825 days.    OTHER: No results found for this or any previous visit from the past 1825 days.    LAST IMAGING RESULTS  Epidural Steroid Injection  Table formatting from the original result was not included.  Procedure  Epidural Steroid Injection    Indication  Lumbar postlaminectomy syndrome    Medications  Totals unavailable because the procedure time range is not set     Preprocedure  A history and physical has been performed, and patient medication   allergies have been reviewed. The patient's tolerance of previous   anesthesia has been reviewed. The risks and benefits of the procedure and   the sedation options and risks were discussed with the patient. All   questions were answered and informed consent obtained.    Details of the Procedure  caudal  Consent:   Consent  obtained:  Written  Consent given by:  Patient  Risks, benefits, and alternatives were discussed: yes    Risks discussed:  Bleeding, infection, pain and nerve damage  Alternatives discussed:  No treatment, delayed treatment and alternative   treatment  Universal protocol:   Procedure explained and questions answered to patient or proxy's   satisfaction: yes    Relevant documents present and verified: yes    Test results available: yes    Imaging studies available: yes    Required blood products, implants, devices, and special equipment   available: yes    Site/side marked: yes    Immediately prior to procedure, a time out was called: yes    Patient identity confirmed:  Verbally with patient, hospital-assigned   identification number and arm band  Procedure specific details:     Caudal JOHNNA    The patient was positioned comfortably into the supine position and was   prepped and draped in the usual sterile manner.  Sterile precautions,   including sterile gloves, disposable cap and masks were worn for the   procedure at all times. Skeletal landmarks were identified under   fluoroscopy. There was no evidence of infection at the site(s) of needle   insertion.  A final time-out was done.  The skin and subcutaneous tissue   at insertion site was anesthetized with 1% lidocaine with or without   sodium bicarbonate.  Under fluoroscopic guidance, the needle listed below   was placed into the epidural space through the caudal approach.  If an   Epimed Catheter was used it was advanced to the below level.  Radio-opaque   contrast was not utilized due to a contrast allergy.   Blood was not   aspirated.  CSF was not aspirated.  Paresthesias were not elicited.  A   therapeutic solution as indicated below was injected.  If used, the   catheter was flushed and the catheter and needle were removed as a unit   with tip of the catheter noted to be intact.  Pressure was held over the   site until hemostasis was achieved, and after  ensuring hemostasis and the   absence of hematoma, an adhesive bandage was applied to the site of needle   insertion.  Preservative-free solutions were utilized in the epidural   space.      Needle type and catheter: 22 gauge Quincke Spinal  Level catheter advanced if used: [N/A]  Medications [9 mL of normal saline and 10 mg dexamethasone]    Procedure Provider  Esteban Rodriguez MD    Procedure Location  [facility] [address] [address] [phone]    Referring Provider  Radha Bullard, APRN-CNP, APRN-CNS  [address] pain management  These images are not reportable by radiology and will not be interpreted   by  Radiologists.      Problem List Items Addressed This Visit       Chest pain - Primary    Hypertension    Dyslipidemia    PVC (premature ventricular contraction)    Palpitations    Obesity (BMI 30-39.9)            Paulo Galicia DO, FACC, FACOI

## 2024-08-14 ENCOUNTER — OFFICE VISIT (OUTPATIENT)
Dept: CARDIOLOGY | Facility: HOSPITAL | Age: 84
End: 2024-08-14
Payer: MEDICARE

## 2024-08-14 VITALS
WEIGHT: 164 LBS | HEART RATE: 68 BPM | BODY MASS INDEX: 30.18 KG/M2 | SYSTOLIC BLOOD PRESSURE: 130 MMHG | HEIGHT: 62 IN | DIASTOLIC BLOOD PRESSURE: 60 MMHG

## 2024-08-14 DIAGNOSIS — R00.2 PALPITATIONS: ICD-10-CM

## 2024-08-14 DIAGNOSIS — E78.5 DYSLIPIDEMIA: ICD-10-CM

## 2024-08-14 DIAGNOSIS — E66.9 OBESITY (BMI 30-39.9): ICD-10-CM

## 2024-08-14 DIAGNOSIS — I10 PRIMARY HYPERTENSION: ICD-10-CM

## 2024-08-14 DIAGNOSIS — I49.3 PVC (PREMATURE VENTRICULAR CONTRACTION): ICD-10-CM

## 2024-08-14 DIAGNOSIS — R07.9 CHEST PAIN, UNSPECIFIED TYPE: Primary | ICD-10-CM

## 2024-08-14 PROCEDURE — 93010 ELECTROCARDIOGRAM REPORT: CPT | Performed by: INTERNAL MEDICINE

## 2024-08-14 PROCEDURE — 3075F SYST BP GE 130 - 139MM HG: CPT | Performed by: INTERNAL MEDICINE

## 2024-08-14 PROCEDURE — 99214 OFFICE O/P EST MOD 30 MIN: CPT | Performed by: INTERNAL MEDICINE

## 2024-08-14 PROCEDURE — 1123F ACP DISCUSS/DSCN MKR DOCD: CPT | Performed by: INTERNAL MEDICINE

## 2024-08-14 PROCEDURE — 3078F DIAST BP <80 MM HG: CPT | Performed by: INTERNAL MEDICINE

## 2024-08-14 PROCEDURE — 1036F TOBACCO NON-USER: CPT | Performed by: INTERNAL MEDICINE

## 2024-08-14 PROCEDURE — 93005 ELECTROCARDIOGRAM TRACING: CPT | Performed by: INTERNAL MEDICINE

## 2024-08-14 PROCEDURE — 1159F MED LIST DOCD IN RCRD: CPT | Performed by: INTERNAL MEDICINE

## 2024-08-14 RX ORDER — TRAMADOL HYDROCHLORIDE 50 MG/1
50 TABLET ORAL EVERY 6 HOURS PRN
COMMUNITY

## 2024-08-14 RX ORDER — TRIAMCINOLONE ACETONIDE 1 MG/G
CREAM TOPICAL
COMMUNITY

## 2024-08-29 ENCOUNTER — OFFICE VISIT (OUTPATIENT)
Dept: PAIN MEDICINE | Facility: HOSPITAL | Age: 84
End: 2024-08-29
Payer: MEDICARE

## 2024-08-29 VITALS
RESPIRATION RATE: 16 BRPM | DIASTOLIC BLOOD PRESSURE: 73 MMHG | SYSTOLIC BLOOD PRESSURE: 122 MMHG | HEART RATE: 58 BPM | BODY MASS INDEX: 30.18 KG/M2 | OXYGEN SATURATION: 95 % | HEIGHT: 62 IN | WEIGHT: 164 LBS

## 2024-08-29 DIAGNOSIS — M96.1 POSTLAMINECTOMY SYNDROME OF LUMBAR REGION: ICD-10-CM

## 2024-08-29 DIAGNOSIS — M96.1 LUMBAR POSTLAMINECTOMY SYNDROME: ICD-10-CM

## 2024-08-29 DIAGNOSIS — Z79.891 ENCOUNTER FOR LONG-TERM USE OF OPIATE ANALGESIC: ICD-10-CM

## 2024-08-29 DIAGNOSIS — M54.16 CHRONIC LUMBAR RADICULOPATHY: Primary | ICD-10-CM

## 2024-08-29 PROCEDURE — 1036F TOBACCO NON-USER: CPT | Performed by: CLINICAL NURSE SPECIALIST

## 2024-08-29 PROCEDURE — 1160F RVW MEDS BY RX/DR IN RCRD: CPT | Performed by: CLINICAL NURSE SPECIALIST

## 2024-08-29 PROCEDURE — 3074F SYST BP LT 130 MM HG: CPT | Performed by: CLINICAL NURSE SPECIALIST

## 2024-08-29 PROCEDURE — 3078F DIAST BP <80 MM HG: CPT | Performed by: CLINICAL NURSE SPECIALIST

## 2024-08-29 PROCEDURE — 1123F ACP DISCUSS/DSCN MKR DOCD: CPT | Performed by: CLINICAL NURSE SPECIALIST

## 2024-08-29 PROCEDURE — 1159F MED LIST DOCD IN RCRD: CPT | Performed by: CLINICAL NURSE SPECIALIST

## 2024-08-29 PROCEDURE — 99214 OFFICE O/P EST MOD 30 MIN: CPT | Performed by: CLINICAL NURSE SPECIALIST

## 2024-08-29 RX ORDER — DULOXETIN HYDROCHLORIDE 30 MG/1
30 CAPSULE, DELAYED RELEASE ORAL DAILY
Qty: 30 CAPSULE | Refills: 2 | Status: SHIPPED | OUTPATIENT
Start: 2024-08-29 | End: 2024-09-28

## 2024-08-29 RX ORDER — HYDROCODONE BITARTRATE AND ACETAMINOPHEN 5; 325 MG/1; MG/1
1 TABLET ORAL 2 TIMES DAILY PRN
Qty: 60 TABLET | Refills: 0 | Status: SHIPPED | OUTPATIENT
Start: 2024-08-29 | End: 2024-09-28

## 2024-08-29 ASSESSMENT — PATIENT HEALTH QUESTIONNAIRE - PHQ9
SUM OF ALL RESPONSES TO PHQ9 QUESTIONS 1 AND 2: 0
1. LITTLE INTEREST OR PLEASURE IN DOING THINGS: NOT AT ALL
2. FEELING DOWN, DEPRESSED OR HOPELESS: NOT AT ALL

## 2024-08-29 ASSESSMENT — ENCOUNTER SYMPTOMS
OCCASIONAL FEELINGS OF UNSTEADINESS: 1
LOSS OF SENSATION IN FEET: 0
DEPRESSION: 0

## 2024-08-29 ASSESSMENT — LIFESTYLE VARIABLES: TOTAL SCORE: 0

## 2024-08-29 NOTE — ASSESSMENT & PLAN NOTE
84-year-old female with symptoms consistent with postlaminectomy syndrome presents for follow up.  Persistent lumbar radicular symptoms as well as thoracic/myofascial pain.  Patient was sent for a caudal epidural steroid injection at her last office visit targeting lumbar radicular symptoms.  Patient endorsing minimal relief with this injection, although she has noticed a decrease in neuropathic symptoms/neuropathy to her lower extremities.  Currently experiencing low back pain radiating to bilateral hips and intermittently to her lower extremities.  Continues to experience mid back/thoracic pain which appears to have a myofascial component.  Patient's posture remains altered with chronic right hip elevation which may be contributing to her back pain as well. Continued weakness to bilateral lower extremities unchanged from previous exam.  She did have her spinal cord stimulator interrogated and was provided 2 additional programs at today's visit.  She does feel that she is beginning to receive some additional relief with the changes in her program for her stimulator.  States that she is able to sit up straighter and is able to stand straighter secondary to improvement in symptoms.  She did complete a formal course of physical therapy and was provided home therapy exercises and stretches to continue.  She states that she does the exercises daily.  Continues to benefit from her hydrocodone which she takes 2 times per day.  Advised patient that she may increase her Tylenol to 1000 mg up to 2 times per day as needed for additional pain.  Recommended she keep her total Tylenol dose less than 3000 mg in 24 hours.  Patient discontinued her duloxetine secondary to confusion with medication instructions.  Recommended she restart her duloxetine 30 mg daily and take continuously for optimal relief.  Advised patient not to stop this medication abruptly.  Also recommended she discontinue her nortriptyline as she feels that this  medication has not been helpful.  She is unsure if she is taking nortriptyline. Plan reviewed with patient at today's office visit.    -Continue hydrocodone 5 mg up to 2 times per day as needed for pain.  We will continue this medication as long as it provides relief for the patient.  If she fails to receive relief we may wean her off this medication.  -Restart duloxetine 30 mg daily.  May consider increasing this dose in the future if patient feels it is beneficial.  -Patient will discontinue nortriptyline at this time.  -Advised patient that she may supplement with extra strength Tylenol taking 2 tablets up to 2 times per day as needed for pain.  Advised her to keep Tylenol dose less than 3000 mg in 24 hours.  -Encourage patient to continue home therapy exercises and stretches consistently.  -Patient will follow-up approximately 2 months for reevaluation.  Provided the patient with written instructions about above noted medication changes.  MEDICAL DECISION MAKING:    My impressions and treatment recommendations were discussed in detail with the patient, who verbalized understanding and had no further questions prior to discharge. Given the patient's report of reduced pain and improved functional ability without adverse effects,  it is reasonable to continue hydrocodone 5mg up to 2 times per day as needed. The terms of the opioid agreement as well as the potential risks and adverse effects of the patient's medication regimen were discussed in detail. This includes if applicable due to dosage of medication permission to discuss and coordinate care with other treatment providers relevant to the patients condition. The patient verbalized understanding.    Treatment goals were discussed in detail with the patient.  These goals include reduction of pain levels, improved levels of functioning, avoidance of medication side effect and lowest medication dose possible to achieve  these goals.  The patient was in full  agreement with these goals.  Also discussed is the understanding that pain may not be eliminated by medication but that the goal of a better sustaining life through use of medication is appropriate.  Lifestyle modifications including weight management, stretching, diet, exercise and smoking are addressed at each office visit.  The patient provided a urine drug screen for routine monitoring and compliance.  This test may be given as frequently as every month based on the patient's individual opiate risk stratification and prescriber concerns for any aberrancies.  This test is indicated given the use of controlled substances for the patient's medical condition.  Unless otherwise noted the prior (s) urine drug testing results were consistent with prescribed medications.  There is no evidence of illicit drug use or additional medications ingested.     Risks and side effects of chronic opioid therapy including but not limited to tolerance, dependence, constipation, hyperalgesia, cognitive side effects, addiction and possible death due to overuse and or misuse were discussed. I also discussed that such medications when co-administered with other sedative agents including but not limited to alcohol, benzodiazepines, sedative hypnotics and illegal drugs could pose life threatening consequences including death.  I also explained the impact that the administration of such medication has on a patient with obstructive sleep apnea and continued recommendations for use of apnea devices if ordered are prescribed by other physicians.  In order to effectively and safely treat the pain, I also emphasized the importance of compliance with the treatment plan as well as compliance with the terms of the opioid agreement which was reviewed in detail. I explained the importance of being responsible with the medications and to take these only as prescribed, never in excess and never for reasons other than pain reduction. The patient was  counseled on keeping the medications safe and locked away from children and other adults as well as disposal methods and options. The patient understood the risks and instructions.      I also discussed with the patient in detail that based on the clinical response to the opioid medications and improvements of activities of daily living, sleep, and work performance in light of compliance with the treatment plan we can continue this form of therapy for the above chronic  pain.  The goal and rationale used for current treatment with chronic opioid medication is to control the pain and alleviate disability induced by the chronic pain condition noted above after failures of other non-opioid and nonpharmacological modalities to treat the chronic pain and the symptoms associated with have failed.  The patient understood the goals in terms of the above treatment plan and had no further questions prior to leaving the office today.    Given the patient's total MED, general use of daily opiates, or other coadministered medications in various classes the patient was offered a prescription for Narcan.  I instructed the patient that Narcan is for emergency use only and is worse suspected or known opiate overdose.  Call 911 prior to administration of this medication to activate the emergency response team.  It is imperative to fill this medication in order to demonstrate understanding of the gravity of possible side effects including respiratory depression and risk of overdose of this opiate load or medication combination.  As such patients will be required to bring Narcan prescriptions to follow-up appointments as part of the compliance with continued opiate care.    Disclaimer: This note was transcribed using an audio transcription device.  As such, minor errors may be present with regard to spelling, punctuation, and inadvertent word insertion.  Please disregard such errors.

## 2024-08-29 NOTE — LETTER
CONTINUE DULOXETINE 30 MG DAILY    STOP TAKING NORTRIPTYLINE    YOU CAN TAKE EXTRA STRENGTH TYLENOL 2 TABLETS TWICE A DAY as NEEDED

## 2024-08-29 NOTE — PROGRESS NOTES
Subjective   Patient ID: Janelle Jung is a 84 y.o. female who presents for thoracic and lumbar pain.  HPI    84-year-old female with history of chronic low back pain status post L3-S1 fusion with L3-5 laminectomy presents for follow-up.  Patient has a spinal cord stimulator with [employer] for lumbar radicular symptoms/lumbar postlaminectomy pain typically gets good relief with her stimulator, however, she has been noticing decreased efficacy.  She has noticed decrease in coverage from her stimulator.  Previous diagnostic lumbar facet injections and caudal epidural steroid injections in the past have provided minimal relief.  Most recent thoracic x-ray to evaluate thoracic pain was consistent with multilevel spondylosis with no acute fracture.  She has completed a formal course of physical therapy to treat myofascial component of thoracic pain to address posture, gait and balance.  At that time she felt the physical therapy increased her pain.  Sent for repeat lumbar MRI due to persistent pain.  MRI consistent with stable imaging as compared to MRI in 2022, degenerative postoperative changes without high-grade central canal or foraminal stenosis identified.  Reviewed imaging and patient's symptoms with Dr. Rodriguez who suggested the patient may benefit from repeating a caudal epidural steroid injection.  Patient was scheduled for a caudal epidural steroid injection at her last office visit.  Continues to manage her pain with hydrocodone 5 mg 2 times per day as needed.  Recommended she try to take this medication consistently for improvement in symptoms.  Previously has failed additional medications including buprenorphine and gabapentin.  She had stopped her duloxetine and was advised to restart this medication at her last office visit.  She was unsure if she was taking nortriptyline.  Presents at today's office visit for follow-up after recent caudal epidural steroid injection.  Patient is endorsing no  relief with recent injection.  Continues to experience low back pain radiating up to her thoracic region as well as into the top of her hips and occasionally into her lower extremities.  Although she does not feel that this injection provided relief she has noted a decrease in numbness/tingling in her lower extremities.  Continues to endorse weakness in her lower extremities unchanged from previous exam.  Nuys any changes in bowel/bladder function.  Describes her pain as aching and throbbing.  Her pain increases with standing even for a short time and walking even a short distance.  Met with the [employer] representative at today's visit and had her stimulator reprogrammed which is providing additional coverage for her lower extremities.  Patient currently stating that she is able to sit up straighter and stand straighter secondary to improvement in pain.  Continues to manage her pain with hydrocodone 5 mg up to 2 times per day as needed.  She had restarted her duloxetine and completed the prescription.  Patient states she stopped the duloxetine when she ran out of this prescription and has not had duloxetine for several weeks.  She was tolerating the medication without adverse effects.  She is unsure if she restarted her nortriptyline.  She did not feel that nortriptyline was providing any additional relief.  She will supplement with 1 extra strength Tylenol per day.  She did complete additional physical therapy and states she was discharged with home therapy exercises to continue.  Location of Pain:pt is here for interval follow up and medication count. Patient states she is having low back pain and that moves up the back to the middle back.     Pain Score: 6/10     Treatment: Norco 5/325mg BID  Count: 3 pills left in rx bottle  Fill Date: 7/26/24  Last Dose Taken: 8/29/24 1 dose and takes BID    Narcan: exp 09/2024- gave harm reduction pamphlet     Other medications: Duloxetine 30mg-was she on a higher  dose     Injection/Procedures: SCS [employer]- helps with radicular pain but not lower back pain/ Bilateral Lumbar MBB - 0% relief with 2nd injection     PT- 4 sessions and was given the exercises to do at home  OARRS:  Radha Bullard, APRN-CNP, APRN-CNS on 8/29/2024  2:00 PM  I have personally reviewed the OARRS report for Janelle Jung. I have considered the risks of abuse, dependence, addiction and diversion    Is the patient prescribed a combination of a benzodiazepine and opioid?  No    Last Urine Drug Screen / ordered today: No  Recent Results (from the past 8760 hour(s))   Confirmation Opiate/Opioid/Benzo Prescription Compliance    Collection Time: 04/24/24  4:18 PM   Result Value Ref Range    Clonazepam <25 <25 ng/mL    7-Aminoclonazepam <25 <25 ng/mL    Alprazolam <25 <25 ng/mL    Alpha-Hydroxyalprazolam <25 <25 ng/mL    Midazolam <25 <25 ng/mL    Alpha-Hydroxymidazolam <25 <25 ng/mL    Chlordiazepoxide <25 <25 ng/mL    Diazepam <25 <25 ng/mL    Nordiazepam <25 <25 ng/mL    Temazepam <25 <25 ng/mL    Oxazepam <25 <25 ng/mL    Lorazepam <25 <25 ng/mL    Methadone <25 <25 ng/mL    EDDP <25 <25 ng/mL    6-Acetylmorphine <25 <25 ng/mL    Codeine <50 <50 ng/mL    Hydrocodone >2,500 (H) <25 ng/mL    Hydromorphone 658 (H) <25 ng/mL    Morphine  <50 <50 ng/mL    Norhydrocodone >1,000 (H) <25 ng/mL    Noroxycodone <25 <25 ng/mL    Oxycodone <25 <25 ng/mL    Oxymorphone <25 <25 ng/mL    Fentanyl <2.5 <2.5 ng/mL    Norfentanyl <2.5 <2.5 ng/mL    Tramadol <50 <50 ng/mL    O-Desmethyltramadol <50 <50 ng/mL    Zolpidem <25 <25 ng/mL    Zolpidem Metabolite (ZCA) <25 <25 ng/mL   Screen Opiate/Opioid/Benzo Prescription Compliance    Collection Time: 04/24/24  4:18 PM   Result Value Ref Range    Creatinine, Urine Random 242.8 20.0 - 320.0 mg/dL    Amphetamine Screen, Urine Presumptive Negative Presumptive Negative    Barbiturate Screen, Urine Presumptive Negative Presumptive Negative    Cannabinoid Screen, Urine  Presumptive Negative Presumptive Negative    Cocaine Metabolite Screen, Urine Presumptive Negative Presumptive Negative    PCP Screen, Urine Presumptive Negative Presumptive Negative   Tapentadol Confirmation, Urine    Collection Time: 04/24/24  4:18 PM   Result Value Ref Range    Tapentadol <25 <25 ng/mL    N-Desmethyltapentadol <25 <25 ng/mL   Buprenorphine Confirm,Urine    Collection Time: 04/24/24  4:18 PM   Result Value Ref Range    BUPRENORPHINE GLUC, URINE <5 ng/mL    BUPRENORPHINE ,URINE <2 ng/mL    NALOXONE, URINE <100 ng/mL    NORBUPRENORPHINE GLUC,URINE <5 ng/mL    NORBUPRENORPHINE, URINE <2 ng/mL     Results are as expected.     Controlled Substance Agreement:  Date of the Last Agreement: 8/29/24  Reviewed Controlled Substance Agreement including but not limited to the benefits, risks, and alternatives to treatment with a Controlled Substance medication(s).    Monitoring and compliance:    ORT:    PDUQ:    Office Agreement:      Review of Systems    ROS:   General: No fevers, chills, weight loss  Skin: Negative for lesions  Eyes: No acute vision changes  Ears: No vertigo  Nose, mouth, throat: No difficulty swallowing or speaking  Respiratory: No cough, shortness of breath, cyanosis  Cardiovascular: Negative for chest pain syncope or palpitation  Gastrointestinal: No constipation, nausea, vomiting  Neurological: Negative for headache, positive for: Intermittent paresthesia and weakness  Psychological: Negative for severe or debilitating anxiety, depression. Negative memory loss  Musculoskeletal: Positive for arthralgia, myalgia and pain  Endocrine: Negative for weight gain, appetite changes, excessive sweating  Allergy/immune: Negative    All 13 systems were reviewed and are within normal levels except as noted or in the history of present illness.  Positive or pertinent negative responses are noted or were in the history of present illness. As noted, the patient denies significant or impairing weakness  in the bilateral upper and lower extremities, medication induced constipation, and bowel or bladder incontinence.     Current Outpatient Medications:     alendronate (Fosamax) 70 mg tablet, Take 1 tablet (70 mg) by mouth every 7 days. Take in the morning with a full glass of water, on an empty stomach, and do not take anything else by mouth or lie down for the next 30 min., Disp: , Rfl:     amLODIPine (Norvasc) 10 mg tablet, Take 1 tablet (10 mg) by mouth once daily., Disp: , Rfl:     biotin 1 mg tablet, Take 1 tablet (1 mg) by mouth once daily., Disp: , Rfl:     Bystolic 10 mg tablet, Take 1 tablet (10 mg) by mouth once daily., Disp: , Rfl:     cholecalciferol (Vitamin D-3) 50 mcg (2,000 unit) capsule, Take by mouth., Disp: , Rfl:     omeprazole (PriLOSEC) 40 mg DR capsule, Take 1 capsule (40 mg) by mouth once daily., Disp: , Rfl:     ondansetron (Zofran) 4 mg tablet, Take 1 tablet (4 mg) by mouth every 8 hours if needed for nausea or vomiting., Disp: , Rfl:     rosuvastatin (Crestor) 40 mg tablet, Take 1 tablet (40 mg) by mouth once daily., Disp: , Rfl:     spironolactone (Aldactone) 25 mg tablet, Take 1 tablet (25 mg) by mouth once daily., Disp: , Rfl:     triamcinolone (Kenalog) 0.1 % cream, 1 application sparingly to affected area Externally Two times a Week for 30 days, Disp: , Rfl:     DULoxetine (Cymbalta) 30 mg DR capsule, Take 1 capsule (30 mg) by mouth once daily. Do not crush or chew., Disp: 30 capsule, Rfl: 2    HYDROcodone-acetaminophen (Norco) 5-325 mg tablet, Take 1 tablet by mouth 2 times a day as needed for severe pain (7 - 10)., Disp: 60 tablet, Rfl: 0    naloxone (Narcan) 4 mg/0.1 mL nasal spray, Administer into affected nostril(s)., Disp: , Rfl:      Past Medical History:   Diagnosis Date    Essential (primary) hypertension     Hypertension, well controlled        No past surgical history on file.     No family history on file.     Allergies   Allergen Reactions    Plant Extracts Unknown     "Adhesive Tape-Silicones Unknown    Fluoxetine GI Upset, Hives, Nausea Only and Unknown    Gabapentin Dizziness and Unknown    Iodinated Contrast Media Unknown    Iodine Unknown    Latex Unknown    Metabo Ephedra-Free Unknown    Metabo-Style Hives    Metronidazole Unknown    Niacin Unknown    Sulfamethoxazole Unknown    Sulfasalazine Hives    Sulfites Unknown    Vit E-Min-Herb Com#37-Bovi Com Unknown    Sulfamethoxazole-Trimethoprim Rash        Objective     Visit Vitals  /73   Pulse 58   Resp 16   Ht 1.575 m (5' 2\")   Wt 74.4 kg (164 lb)   SpO2 95%   BMI 30.00 kg/m²   Smoking Status Former   BSA 1.8 m²        Physical Exam    PE:  General: Well-developed, well-nourished, no acute distress. The patient demonstrates no pain behavior, symptom magnification or overt drug-seeking behavior.  Eye: Pupils appropriate for room lighting  Neck/thyroid: No obvious goiter or enlargement of neck noted  Respiratory exam: Normal respiratory effort, unlabored respiration. No accessory muscle use noted  Cardiac exam: Bilateral radial pulses intact  Abdominal: Nondistended  Spine, thoracic: Tenderness to paraspinous musculature throughout thoracic region.  Minimal tenderness over thoracic spine.  No gross step-offs noted.  Spine, lumbar: The patient is able to rise from a seated to standing position with some hesitancy.  Her gait is slow , deliberate and waddling.  Chronic elevation of her right hip.  Paraspinous musculature lower lumbar region.  Flexion intact with extension limited and increasing pain to her low back.  Positive straight leg raise right lower extremity.   Neurologic exam: Muscle strength is antigravity in all 4 extremities.  Psychiatric exam: Judgment and insight normal, affect normal, speech is fluent, affect appropriate, demonstrating no signs of hypersomnolence, sedation, or confusion            Assessment/Plan   Problem List Items Addressed This Visit             ICD-10-CM    Chronic lumbar radiculopathy - " Primary M54.16    Relevant Medications    DULoxetine (Cymbalta) 30 mg DR capsule    HYDROcodone-acetaminophen (Norco) 5-325 mg tablet    Lumbar postlaminectomy syndrome M96.1     84-year-old female with symptoms consistent with postlaminectomy syndrome presents for follow up.  Persistent lumbar radicular symptoms as well as thoracic/myofascial pain.  Patient was sent for a caudal epidural steroid injection at her last office visit targeting lumbar radicular symptoms.  Patient endorsing minimal relief with this injection, although she has noticed a decrease in neuropathic symptoms/neuropathy to her lower extremities.  Currently experiencing low back pain radiating to bilateral hips and intermittently to her lower extremities.  Continues to experience mid back/thoracic pain which appears to have a myofascial component.  Patient's posture remains altered with chronic right hip elevation which may be contributing to her back pain as well. Continued weakness to bilateral lower extremities unchanged from previous exam.  She did have her spinal cord stimulator interrogated and was provided 2 additional programs at today's visit.  She does feel that she is beginning to receive some additional relief with the changes in her program for her stimulator.  States that she is able to sit up straighter and is able to stand straighter secondary to improvement in symptoms.  She did complete a formal course of physical therapy and was provided home therapy exercises and stretches to continue.  She states that she does the exercises daily.  Continues to benefit from her hydrocodone which she takes 2 times per day.  Advised patient that she may increase her Tylenol to 1000 mg up to 2 times per day as needed for additional pain.  Recommended she keep her total Tylenol dose less than 3000 mg in 24 hours.  Patient discontinued her duloxetine secondary to confusion with medication instructions.  Recommended she restart her duloxetine 30 mg  daily and take continuously for optimal relief.  Advised patient not to stop this medication abruptly.  Also recommended she discontinue her nortriptyline as she feels that this medication has not been helpful.  She is unsure if she is taking nortriptyline. Plan reviewed with patient at today's office visit.    -Continue hydrocodone 5 mg up to 2 times per day as needed for pain.  We will continue this medication as long as it provides relief for the patient.  If she fails to receive relief we may wean her off this medication.  -Restart duloxetine 30 mg daily.  May consider increasing this dose in the future if patient feels it is beneficial.  -Patient will discontinue nortriptyline at this time.  -Advised patient that she may supplement with extra strength Tylenol taking 2 tablets up to 2 times per day as needed for pain.  Advised her to keep Tylenol dose less than 3000 mg in 24 hours.  -Encourage patient to continue home therapy exercises and stretches consistently.  -Patient will follow-up approximately 2 months for reevaluation.  Provided the patient with written instructions about above noted medication changes.  MEDICAL DECISION MAKING:    My impressions and treatment recommendations were discussed in detail with the patient, who verbalized understanding and had no further questions prior to discharge. Given the patient's report of reduced pain and improved functional ability without adverse effects,  it is reasonable to continue hydrocodone 5mg up to 2 times per day as needed. The terms of the opioid agreement as well as the potential risks and adverse effects of the patient's medication regimen were discussed in detail. This includes if applicable due to dosage of medication permission to discuss and coordinate care with other treatment providers relevant to the patients condition. The patient verbalized understanding.    Treatment goals were discussed in detail with the patient.  These goals include  reduction of pain levels, improved levels of functioning, avoidance of medication side effect and lowest medication dose possible to achieve  these goals.  The patient was in full agreement with these goals.  Also discussed is the understanding that pain may not be eliminated by medication but that the goal of a better sustaining life through use of medication is appropriate.  Lifestyle modifications including weight management, stretching, diet, exercise and smoking are addressed at each office visit.  The patient provided a urine drug screen for routine monitoring and compliance.  This test may be given as frequently as every month based on the patient's individual opiate risk stratification and prescriber concerns for any aberrancies.  This test is indicated given the use of controlled substances for the patient's medical condition.  Unless otherwise noted the prior (s) urine drug testing results were consistent with prescribed medications.  There is no evidence of illicit drug use or additional medications ingested.     Risks and side effects of chronic opioid therapy including but not limited to tolerance, dependence, constipation, hyperalgesia, cognitive side effects, addiction and possible death due to overuse and or misuse were discussed. I also discussed that such medications when co-administered with other sedative agents including but not limited to alcohol, benzodiazepines, sedative hypnotics and illegal drugs could pose life threatening consequences including death.  I also explained the impact that the administration of such medication has on a patient with obstructive sleep apnea and continued recommendations for use of apnea devices if ordered are prescribed by other physicians.  In order to effectively and safely treat the pain, I also emphasized the importance of compliance with the treatment plan as well as compliance with the terms of the opioid agreement which was reviewed in detail. I explained  the importance of being responsible with the medications and to take these only as prescribed, never in excess and never for reasons other than pain reduction. The patient was counseled on keeping the medications safe and locked away from children and other adults as well as disposal methods and options. The patient understood the risks and instructions.      I also discussed with the patient in detail that based on the clinical response to the opioid medications and improvements of activities of daily living, sleep, and work performance in light of compliance with the treatment plan we can continue this form of therapy for the above chronic  pain.  The goal and rationale used for current treatment with chronic opioid medication is to control the pain and alleviate disability induced by the chronic pain condition noted above after failures of other non-opioid and nonpharmacological modalities to treat the chronic pain and the symptoms associated with have failed.  The patient understood the goals in terms of the above treatment plan and had no further questions prior to leaving the office today.    Given the patient's total MED, general use of daily opiates, or other coadministered medications in various classes the patient was offered a prescription for Narcan.  I instructed the patient that Narcan is for emergency use only and is worse suspected or known opiate overdose.  Call 911 prior to administration of this medication to activate the emergency response team.  It is imperative to fill this medication in order to demonstrate understanding of the gravity of possible side effects including respiratory depression and risk of overdose of this opiate load or medication combination.  As such patients will be required to bring Narcan prescriptions to follow-up appointments as part of the compliance with continued opiate care.    Disclaimer: This note was transcribed using an audio transcription device.  As such, minor  errors may be present with regard to spelling, punctuation, and inadvertent word insertion.  Please disregard such errors.             Relevant Medications    DULoxetine (Cymbalta) 30 mg DR capsule    HYDROcodone-acetaminophen (Norco) 5-325 mg tablet    Encounter for long-term use of opiate analgesic Z79.891    Postlaminectomy syndrome of lumbar region M96.1

## 2024-10-22 ENCOUNTER — OFFICE VISIT (OUTPATIENT)
Dept: PAIN MEDICINE | Facility: HOSPITAL | Age: 84
End: 2024-10-22
Payer: MEDICARE

## 2024-10-22 VITALS
BODY MASS INDEX: 30.29 KG/M2 | OXYGEN SATURATION: 95 % | SYSTOLIC BLOOD PRESSURE: 132 MMHG | DIASTOLIC BLOOD PRESSURE: 61 MMHG | WEIGHT: 164.6 LBS | HEART RATE: 60 BPM | HEIGHT: 62 IN | RESPIRATION RATE: 16 BRPM

## 2024-10-22 DIAGNOSIS — Z79.891 LONG TERM PRESCRIPTION OPIATE USE: ICD-10-CM

## 2024-10-22 DIAGNOSIS — M96.1 LUMBAR POSTLAMINECTOMY SYNDROME: ICD-10-CM

## 2024-10-22 DIAGNOSIS — M96.1 POSTLAMINECTOMY SYNDROME OF LUMBAR REGION: Primary | ICD-10-CM

## 2024-10-22 DIAGNOSIS — M54.16 CHRONIC LUMBAR RADICULOPATHY: ICD-10-CM

## 2024-10-22 LAB
AMPHETAMINES UR QL SCN: ABNORMAL
BARBITURATES UR QL SCN: ABNORMAL
BZE UR QL SCN: ABNORMAL
CANNABINOIDS UR QL SCN: ABNORMAL
CREAT UR-MCNC: 337.4 MG/DL (ref 20–320)
ETHANOL ?TM UR-MCNC: <10 MG/DL
PCP UR QL SCN: ABNORMAL

## 2024-10-22 PROCEDURE — 1159F MED LIST DOCD IN RCRD: CPT | Performed by: CLINICAL NURSE SPECIALIST

## 2024-10-22 PROCEDURE — 80348 DRUG SCREENING BUPRENORPHINE: CPT | Performed by: CLINICAL NURSE SPECIALIST

## 2024-10-22 PROCEDURE — 99214 OFFICE O/P EST MOD 30 MIN: CPT | Performed by: CLINICAL NURSE SPECIALIST

## 2024-10-22 PROCEDURE — 82570 ASSAY OF URINE CREATININE: CPT | Mod: PORLAB | Performed by: CLINICAL NURSE SPECIALIST

## 2024-10-22 PROCEDURE — 3078F DIAST BP <80 MM HG: CPT | Performed by: CLINICAL NURSE SPECIALIST

## 2024-10-22 PROCEDURE — 1160F RVW MEDS BY RX/DR IN RCRD: CPT | Performed by: CLINICAL NURSE SPECIALIST

## 2024-10-22 PROCEDURE — 80307 DRUG TEST PRSMV CHEM ANLYZR: CPT | Mod: PORLAB | Performed by: CLINICAL NURSE SPECIALIST

## 2024-10-22 PROCEDURE — 1123F ACP DISCUSS/DSCN MKR DOCD: CPT | Performed by: CLINICAL NURSE SPECIALIST

## 2024-10-22 PROCEDURE — 3075F SYST BP GE 130 - 139MM HG: CPT | Performed by: CLINICAL NURSE SPECIALIST

## 2024-10-22 PROCEDURE — 1036F TOBACCO NON-USER: CPT | Performed by: CLINICAL NURSE SPECIALIST

## 2024-10-22 RX ORDER — HYDROCODONE BITARTRATE AND ACETAMINOPHEN 5; 325 MG/1; MG/1
1 TABLET ORAL 2 TIMES DAILY PRN
Qty: 60 TABLET | Refills: 0 | Status: SHIPPED | OUTPATIENT
Start: 2024-10-22 | End: 2024-11-21

## 2024-10-22 ASSESSMENT — PATIENT HEALTH QUESTIONNAIRE - PHQ9
SUM OF ALL RESPONSES TO PHQ9 QUESTIONS 1 AND 2: 1
10. IF YOU CHECKED OFF ANY PROBLEMS, HOW DIFFICULT HAVE THESE PROBLEMS MADE IT FOR YOU TO DO YOUR WORK, TAKE CARE OF THINGS AT HOME, OR GET ALONG WITH OTHER PEOPLE: NOT DIFFICULT AT ALL
2. FEELING DOWN, DEPRESSED OR HOPELESS: SEVERAL DAYS
1. LITTLE INTEREST OR PLEASURE IN DOING THINGS: NOT AT ALL

## 2024-10-22 ASSESSMENT — ENCOUNTER SYMPTOMS
LOSS OF SENSATION IN FEET: 0
DEPRESSION: 0
OCCASIONAL FEELINGS OF UNSTEADINESS: 1

## 2024-10-22 NOTE — ASSESSMENT & PLAN NOTE
84-year-old female with postlaminectomy syndrome presents for follow up of thoracic as well as lumbar radicular pain.  Patient has a spinal cord stimulator targeting lumbar radicular symptoms/lumbar postlaminectomy syndrome.  After recent interrogation and reprogramming the patient is currently receiving significant relief of lumbar radicular symptoms with her stimulator.  Unfortunately she did not receive relief after repeating her caudal epidural steroid injection.  Continues to endorse low back pain which radiates most often to her mid back.  Denies any numbness/tingling, weakness or changes in bowel/bladder function.  She feels that her hydrocodone manages her pain very well.  Patient ran out of her medication approximately 3 days ago and did not call for refill.  She states she has noticed an increase in pain secondary to running out of medication.  She is also packing and moving and has noticed an increase in her pain related to these activities.  She restarted her duloxetine and is tolerating without adverse effects.   She is unsure how much relief she is receiving from this medication.  We discussed increasing her dose of duloxetine to 60 mg daily.  At this time the patient would like to restart her hydrocodone and continue her duloxetine at 30 mg daily.  If she fails to receive relief at her next office visit she will consider increasing her duloxetine.  She will supplement with occasional Tylenol.  Plan reviewed with patient at today's office visit.    -Continue hydrocodone 5 mg up to 2 times per day as needed for pain.  Patient continues to use sparingly and endorses relief when she does use this medication.  -Continue duloxetine 30 mg daily.  Currently tolerating without adverse effects. May consider increasing this dose in the future.  -Patient may supplement with Tylenol keeping total Tylenol dose less than 3000 mg in 24 hours.  -Patient will continue home therapy exercises and stretches as able.     -Patient will follow up in 3 months or sooner if needed.       MEDICAL DECISION MAKING:    My impressions and treatment recommendations were discussed in detail with the patient, who verbalized understanding and had no further questions prior to discharge. Given the patient's report of reduced pain and improved functional ability without adverse effects,  it is reasonable to continue hydrocodone 5mg up to 2 times per day as needed. The terms of the opioid agreement as well as the potential risks and adverse effects of the patient's medication regimen were discussed in detail. This includes if applicable due to dosage of medication permission to discuss and coordinate care with other treatment providers relevant to the patients condition. The patient verbalized understanding.    Treatment goals were discussed in detail with the patient.  These goals include reduction of pain levels, improved levels of functioning, avoidance of medication side effect and lowest medication dose possible to achieve  these goals.  The patient was in full agreement with these goals.  Also discussed is the understanding that pain may not be eliminated by medication but that the goal of a better sustaining life through use of medication is appropriate.  Lifestyle modifications including weight management, stretching, diet, exercise and smoking are addressed at each office visit.  The patient provided a urine drug screen for routine monitoring and compliance.  This test may be given as frequently as every month based on the patient's individual opiate risk stratification and prescriber concerns for any aberrancies.  This test is indicated given the use of controlled substances for the patient's medical condition.  Unless otherwise noted the prior (s) urine drug testing results were consistent with prescribed medications.  There is no evidence of illicit drug use or additional medications ingested.     Risks and side effects of chronic  opioid therapy including but not limited to tolerance, dependence, constipation, hyperalgesia, cognitive side effects, addiction and possible death due to overuse and or misuse were discussed. I also discussed that such medications when co-administered with other sedative agents including but not limited to alcohol, benzodiazepines, sedative hypnotics and illegal drugs could pose life threatening consequences including death.  I also explained the impact that the administration of such medication has on a patient with obstructive sleep apnea and continued recommendations for use of apnea devices if ordered are prescribed by other physicians.  In order to effectively and safely treat the pain, I also emphasized the importance of compliance with the treatment plan as well as compliance with the terms of the opioid agreement which was reviewed in detail. I explained the importance of being responsible with the medications and to take these only as prescribed, never in excess and never for reasons other than pain reduction. The patient was counseled on keeping the medications safe and locked away from children and other adults as well as disposal methods and options. The patient understood the risks and instructions.      I also discussed with the patient in detail that based on the clinical response to the opioid medications and improvements of activities of daily living, sleep, and work performance in light of compliance with the treatment plan we can continue this form of therapy for the above chronic  pain.  The goal and rationale used for current treatment with chronic opioid medication is to control the pain and alleviate disability induced by the chronic pain condition noted above after failures of other non-opioid and nonpharmacological modalities to treat the chronic pain and the symptoms associated with have failed.  The patient understood the goals in terms of the above treatment plan and had no further  questions prior to leaving the office today.    Given the patient's total MED, general use of daily opiates, or other coadministered medications in various classes the patient was offered a prescription for Narcan.  I instructed the patient that Narcan is for emergency use only and is worse suspected or known opiate overdose.  Call 911 prior to administration of this medication to activate the emergency response team.  It is imperative to fill this medication in order to demonstrate understanding of the gravity of possible side effects including respiratory depression and risk of overdose of this opiate load or medication combination.  As such patients will be required to bring Narcan prescriptions to follow-up appointments as part of the compliance with continued opiate care.    Disclaimer: This note was transcribed using an audio transcription device.  As such, minor errors may be present with regard to spelling, punctuation, and inadvertent word insertion.  Please disregard such errors.

## 2024-10-22 NOTE — PROGRESS NOTES
Subjective   Patient ID: Janelle Jung is a 84 y.o. female who presents for thoracic and lumbar pain.  HPI    84-year-old female with history of chronic low back pain status post L3-S1 fusion with L3-5 laminectomy presents for follow-up.  Patient has a spinal cord stimulator with [employer] for lumbar radicular symptoms/lumbar postlaminectomy pain.  Patient stimulator was interrogated and reprogrammed at her last office visit providing additional relief for radicular symptoms in her lower extremities.  Typically gets good relief with her spinal cord stimulator.  Previous diagnostic lumbar facet injections and caudal epidural steroid injections provided minimal relief.  Most recent imaging including thoracic x-ray to evaluate thoracic back pain was consistent with multilevel spondylosis with no acute fracture.  Sent for repeat lumbar MRI due to persistent pain MRI consistent with stable imaging as compared to MRI in 2022, degenerative postoperative changes without high-grade central canal or foraminal stenosis identified.  She has completed formal physical therapy multiple times in the past to treat myofascial component of thoracic pain as well as addressing posture, gait and balance.  She did feel that physical therapy increased her pain.  After review of recent imaging the patient was scheduled to repeat a caudal epidural steroid injection which provided limited relief.  Manages her pain with hydrocodone 5 mg 2 times per day as needed and restarted her duloxetine 30 mg daily.  Previously failed buprenorphine, gabapentin, Robaxin and nortriptyline.  Presenting at today's office visit for routine follow-up.  She continues to experience low back pain which may radiate up toward her thoracic spine.  She denies any radicular symptoms in her lower extremities.  She feels that the recent reprogramming of her spinal cord stimulator has provided significant relief of low back and radicular symptoms.  She denies any  numbness/tingling in her lower extremities.  Denies weakness or changes in bowel/bladder function.  Her pain can be aching and throbbing.  She feels her pain has increased due to more activity with packing/moving as well as running out of her pain medication approximately 3 days ago.  Has been managing her pain with hydrocodone 5 mg up to 1-2 times per day when needed until she recently ran out.  She does feel that she is tolerating her duloxetine 30 mg per day, although she is unsure how much pain relief this medication is providing.  She supplements with Tylenol occasionally.  Continues home therapy exercises and stretches.    Location of Pain:pt is here for interval follow up and medication count. Patient states she is having low back pain and that moves up the back to the middle back.     Pain Score: 6/10     Treatment: Norco 5/325mg BID  Count: 0  Fill Date: per [place]AR[place] 08/29/2024  Last Dose Taken: approx 5 days ago, has been out     Narcan: patient did not bring this visit 10/22/2024     Other medications: Duloxetine 30mg pt does not believe it is working     Injection/Procedures: [employer]- helps with radicular pain but not lower back pain/ Bilateral Lumbar MBB - 0% relief with 2nd injection     PT- 4 sessions and was given the exercises to do at home    OARRS:  Radha Bullard, APRN-CNP, APRN-CNS on 10/22/2024  3:30 PM  I have personally reviewed the OARRS report for Janelle Jung. I have considered the risks of abuse, dependence, addiction and diversion    Is the patient prescribed a combination of a benzodiazepine and opioid?  No    Last Urine Drug Screen / ordered today: Yes  Recent Results (from the past 8760 hours)   Confirmation Opiate/Opioid/Benzo Prescription Compliance    Collection Time: 04/24/24  4:18 PM   Result Value Ref Range    Clonazepam <25 <25 ng/mL    7-Aminoclonazepam <25 <25 ng/mL    Alprazolam <25 <25 ng/mL    Alpha-Hydroxyalprazolam <25 <25 ng/mL    Midazolam <25 <25 ng/mL     Alpha-Hydroxymidazolam <25 <25 ng/mL    Chlordiazepoxide <25 <25 ng/mL    Diazepam <25 <25 ng/mL    Nordiazepam <25 <25 ng/mL    Temazepam <25 <25 ng/mL    Oxazepam <25 <25 ng/mL    Lorazepam <25 <25 ng/mL    Methadone <25 <25 ng/mL    EDDP <25 <25 ng/mL    6-Acetylmorphine <25 <25 ng/mL    Codeine <50 <50 ng/mL    Hydrocodone >2,500 (H) <25 ng/mL    Hydromorphone 658 (H) <25 ng/mL    Morphine  <50 <50 ng/mL    Norhydrocodone >1,000 (H) <25 ng/mL    Noroxycodone <25 <25 ng/mL    Oxycodone <25 <25 ng/mL    Oxymorphone <25 <25 ng/mL    Fentanyl <2.5 <2.5 ng/mL    Norfentanyl <2.5 <2.5 ng/mL    Tramadol <50 <50 ng/mL    O-Desmethyltramadol <50 <50 ng/mL    Zolpidem <25 <25 ng/mL    Zolpidem Metabolite (ZCA) <25 <25 ng/mL   Screen Opiate/Opioid/Benzo Prescription Compliance    Collection Time: 04/24/24  4:18 PM   Result Value Ref Range    Creatinine, Urine Random 242.8 20.0 - 320.0 mg/dL    Amphetamine Screen, Urine Presumptive Negative Presumptive Negative    Barbiturate Screen, Urine Presumptive Negative Presumptive Negative    Cannabinoid Screen, Urine Presumptive Negative Presumptive Negative    Cocaine Metabolite Screen, Urine Presumptive Negative Presumptive Negative    PCP Screen, Urine Presumptive Negative Presumptive Negative   Tapentadol Confirmation, Urine    Collection Time: 04/24/24  4:18 PM   Result Value Ref Range    Tapentadol <25 <25 ng/mL    N-Desmethyltapentadol <25 <25 ng/mL   Buprenorphine Confirm,Urine    Collection Time: 04/24/24  4:18 PM   Result Value Ref Range    BUPRENORPHINE GLUC, URINE <5 ng/mL    BUPRENORPHINE ,URINE <2 ng/mL    NALOXONE, URINE <100 ng/mL    NORBUPRENORPHINE GLUC,URINE <5 ng/mL    NORBUPRENORPHINE, URINE <2 ng/mL     Results are as expected.     Controlled Substance Agreement:  Date of the Last Agreement: 8/29/24  Reviewed Controlled Substance Agreement including but not limited to the benefits, risks, and alternatives to treatment with a Controlled Substance  medication(s).    Monitoring and compliance:    ORT:    PDUQ:    Office Agreement:      Review of Systems    ROS:   General: No fevers, chills, weight loss  Skin: Negative for lesions  Eyes: No acute vision changes  Ears: No vertigo  Nose, mouth, throat: No difficulty swallowing or speaking  Respiratory: No cough, shortness of breath, cyanosis  Cardiovascular: Negative for chest pain syncope or palpitation  Gastrointestinal: No constipation, nausea, vomiting  Neurological: Negative for headache,   Psychological: Negative for severe or debilitating anxiety, depression. Negative memory loss  Musculoskeletal: Positive for arthralgia, myalgia and pain  Endocrine: Negative for weight gain, appetite changes, excessive sweating  Allergy/immune: Negative    All 13 systems were reviewed and are within normal levels except as noted or in the history of present illness.  Positive or pertinent negative responses are noted or were in the history of present illness. As noted, the patient denies significant or impairing weakness in the bilateral upper and lower extremities, medication induced constipation, and bowel or bladder incontinence.     Current Outpatient Medications:     alendronate (Fosamax) 70 mg tablet, Take 1 tablet (70 mg) by mouth every 7 days. Take in the morning with a full glass of water, on an empty stomach, and do not take anything else by mouth or lie down for the next 30 min., Disp: , Rfl:     amLODIPine (Norvasc) 10 mg tablet, Take 1 tablet (10 mg) by mouth once daily., Disp: , Rfl:     biotin 1 mg tablet, Take 1 tablet (1 mg) by mouth once daily., Disp: , Rfl:     Bystolic 10 mg tablet, Take 1 tablet (10 mg) by mouth once daily., Disp: , Rfl:     cholecalciferol (Vitamin D-3) 50 mcg (2,000 unit) capsule, Take by mouth., Disp: , Rfl:     naloxone (Narcan) 4 mg/0.1 mL nasal spray, Administer into affected nostril(s)., Disp: , Rfl:     omeprazole (PriLOSEC) 40 mg DR capsule, Take 1 capsule (40 mg) by mouth  "once daily., Disp: , Rfl:     ondansetron (Zofran) 4 mg tablet, Take 1 tablet (4 mg) by mouth every 8 hours if needed for nausea or vomiting., Disp: , Rfl:     rosuvastatin (Crestor) 40 mg tablet, Take 1 tablet (40 mg) by mouth once daily., Disp: , Rfl:     spironolactone (Aldactone) 25 mg tablet, Take 1 tablet (25 mg) by mouth once daily., Disp: , Rfl:     triamcinolone (Kenalog) 0.1 % cream, 1 application sparingly to affected area Externally Two times a Week for 30 days, Disp: , Rfl:     DULoxetine (Cymbalta) 30 mg DR capsule, Take 1 capsule (30 mg) by mouth once daily. Do not crush or chew., Disp: 30 capsule, Rfl: 2    HYDROcodone-acetaminophen (Norco) 5-325 mg tablet, Take 1 tablet by mouth 2 times a day as needed for severe pain (7 - 10)., Disp: 60 tablet, Rfl: 0     Past Medical History:   Diagnosis Date    Essential (primary) hypertension     Hypertension, well controlled        No past surgical history on file.     No family history on file.     Allergies   Allergen Reactions    Plant Extracts Unknown    Adhesive Tape-Silicones Unknown    Fluoxetine GI Upset, Hives, Nausea Only and Unknown    Gabapentin Dizziness and Unknown    Iodinated Contrast Media Unknown    Iodine Unknown    Latex Unknown    Metabo Ephedra-Free Unknown    Metabo-Style Hives    Metronidazole Unknown    Niacin Unknown    Sulfamethoxazole Unknown    Sulfasalazine Hives    Sulfites Unknown    Vit E-Min-Herb Com#37-Bovi Com Unknown    Sulfamethoxazole-Trimethoprim Rash        Objective     Visit Vitals  /61   Pulse 60   Resp 16   Ht 1.575 m (5' 2\")   Wt 74.7 kg (164 lb 9.6 oz)   SpO2 95%   BMI 30.11 kg/m²   Smoking Status Former   BSA 1.81 m²        Physical Exam    PE:  General: Well-developed, well-nourished, no acute distress. The patient demonstrates no pain behavior, symptom magnification or overt drug-seeking behavior.  Eye: Pupils appropriate for room lighting  Neck/thyroid: No obvious goiter or enlargement of neck " noted  Respiratory exam: Normal respiratory effort, unlabored respiration. No accessory muscle use noted  Cardiac exam: Bilateral radial pulses intact  Abdominal: Nondistended  Spine, thoracic: Tenderness to paraspinous musculature throughout thoracic region.  Minimal tenderness over thoracic spine.  No gross step-offs noted.  Spine, lumbar: The patient is able to rise from a seated to standing position without hesitancy, push off, or delay. Gait is limping favoring right lower extremity.  Chronically elevates her right hip.  Tenderness to paraspinous musculature lower lumbar region.  Flexion intact with extension limited and increasing her low back pain.   Neurologic exam: Muscle strength is antigravity in all 4 extremities.  Equal muscle strength bilateral lower extremities 5/5.  Psychiatric exam: Judgment and insight normal, affect normal, speech is fluent, affect appropriate, demonstrating no signs of hypersomnolence, sedation, or confusion      Assessment/Plan   Problem List Items Addressed This Visit             ICD-10-CM    Chronic lumbar radiculopathy M54.16    Lumbar postlaminectomy syndrome M96.1    Postlaminectomy syndrome of lumbar region - Primary M96.1     84-year-old female with postlaminectomy syndrome presents for follow up of thoracic as well as lumbar radicular pain.  Patient has a spinal cord stimulator targeting lumbar radicular symptoms/lumbar postlaminectomy syndrome.  After recent interrogation and reprogramming the patient is currently receiving significant relief of lumbar radicular symptoms with her stimulator.  Unfortunately she did not receive relief after repeating her caudal epidural steroid injection.  Continues to endorse low back pain which radiates most often to her mid back.  Denies any numbness/tingling, weakness or changes in bowel/bladder function.  She feels that her hydrocodone manages her pain very well.  Patient ran out of her medication approximately 3 days ago and did not  call for refill.  She states she has noticed an increase in pain secondary to running out of medication.  She is also packing and moving and has noticed an increase in her pain related to these activities.  She restarted her duloxetine and is tolerating without adverse effects.   She is unsure how much relief she is receiving from this medication.  We discussed increasing her dose of duloxetine to 60 mg daily.  At this time the patient would like to restart her hydrocodone and continue her duloxetine at 30 mg daily.  If she fails to receive relief at her next office visit she will consider increasing her duloxetine.  She will supplement with occasional Tylenol.  Plan reviewed with patient at today's office visit.    -Continue hydrocodone 5 mg up to 2 times per day as needed for pain.  Patient continues to use sparingly and endorses relief when she does use this medication.  -Continue duloxetine 30 mg daily.  Currently tolerating without adverse effects. May consider increasing this dose in the future.  -Patient may supplement with Tylenol keeping total Tylenol dose less than 3000 mg in 24 hours.  -Patient will continue home therapy exercises and stretches as able.    -Patient will follow up in 3 months or sooner if needed.       MEDICAL DECISION MAKING:    My impressions and treatment recommendations were discussed in detail with the patient, who verbalized understanding and had no further questions prior to discharge. Given the patient's report of reduced pain and improved functional ability without adverse effects,  it is reasonable to continue hydrocodone 5mg up to 2 times per day as needed. The terms of the opioid agreement as well as the potential risks and adverse effects of the patient's medication regimen were discussed in detail. This includes if applicable due to dosage of medication permission to discuss and coordinate care with other treatment providers relevant to the patients condition. The patient  verbalized understanding.    Treatment goals were discussed in detail with the patient.  These goals include reduction of pain levels, improved levels of functioning, avoidance of medication side effect and lowest medication dose possible to achieve  these goals.  The patient was in full agreement with these goals.  Also discussed is the understanding that pain may not be eliminated by medication but that the goal of a better sustaining life through use of medication is appropriate.  Lifestyle modifications including weight management, stretching, diet, exercise and smoking are addressed at each office visit.  The patient provided a urine drug screen for routine monitoring and compliance.  This test may be given as frequently as every month based on the patient's individual opiate risk stratification and prescriber concerns for any aberrancies.  This test is indicated given the use of controlled substances for the patient's medical condition.  Unless otherwise noted the prior (s) urine drug testing results were consistent with prescribed medications.  There is no evidence of illicit drug use or additional medications ingested.     Risks and side effects of chronic opioid therapy including but not limited to tolerance, dependence, constipation, hyperalgesia, cognitive side effects, addiction and possible death due to overuse and or misuse were discussed. I also discussed that such medications when co-administered with other sedative agents including but not limited to alcohol, benzodiazepines, sedative hypnotics and illegal drugs could pose life threatening consequences including death.  I also explained the impact that the administration of such medication has on a patient with obstructive sleep apnea and continued recommendations for use of apnea devices if ordered are prescribed by other physicians.  In order to effectively and safely treat the pain, I also emphasized the importance of compliance with the  treatment plan as well as compliance with the terms of the opioid agreement which was reviewed in detail. I explained the importance of being responsible with the medications and to take these only as prescribed, never in excess and never for reasons other than pain reduction. The patient was counseled on keeping the medications safe and locked away from children and other adults as well as disposal methods and options. The patient understood the risks and instructions.      I also discussed with the patient in detail that based on the clinical response to the opioid medications and improvements of activities of daily living, sleep, and work performance in light of compliance with the treatment plan we can continue this form of therapy for the above chronic  pain.  The goal and rationale used for current treatment with chronic opioid medication is to control the pain and alleviate disability induced by the chronic pain condition noted above after failures of other non-opioid and nonpharmacological modalities to treat the chronic pain and the symptoms associated with have failed.  The patient understood the goals in terms of the above treatment plan and had no further questions prior to leaving the office today.    Given the patient's total MED, general use of daily opiates, or other coadministered medications in various classes the patient was offered a prescription for Narcan.  I instructed the patient that Narcan is for emergency use only and is worse suspected or known opiate overdose.  Call 911 prior to administration of this medication to activate the emergency response team.  It is imperative to fill this medication in order to demonstrate understanding of the gravity of possible side effects including respiratory depression and risk of overdose of this opiate load or medication combination.  As such patients will be required to bring Narcan prescriptions to follow-up appointments as part of the compliance  with continued opiate care.    Disclaimer: This note was transcribed using an audio transcription device.  As such, minor errors may be present with regard to spelling, punctuation, and inadvertent word insertion.  Please disregard such errors.         Relevant Medications    HYDROcodone-acetaminophen (Norco) 5-325 mg tablet     Other Visit Diagnoses         Codes    Long term prescription opiate use     Z79.891    Relevant Orders    Buprenorphine Confirm,Urine    Tapentadol Confirmation, Urine    Alcohol, Urine    Opiate/Opioid/Benzo Prescription Compliance    OOB Internal Tracking

## 2024-10-28 LAB
1OH-MIDAZOLAM UR CFM-MCNC: <25 NG/ML
6MAM UR CFM-MCNC: <25 NG/ML
7AMINOCLONAZEPAM UR CFM-MCNC: <25 NG/ML
A-OH ALPRAZ UR CFM-MCNC: <25 NG/ML
ALPRAZ UR CFM-MCNC: <25 NG/ML
CHLORDIAZEP UR CFM-MCNC: <25 NG/ML
CLONAZEPAM UR CFM-MCNC: <25 NG/ML
CODEINE UR CFM-MCNC: <50 NG/ML
DIAZEPAM UR CFM-MCNC: <25 NG/ML
EDDP UR CFM-MCNC: <25 NG/ML
FENTANYL UR CFM-MCNC: <2.5 NG/ML
HYDROCODONE CTO UR CFM-MCNC: 39 NG/ML
HYDROMORPHONE UR CFM-MCNC: <25 NG/ML
LORAZEPAM UR CFM-MCNC: <25 NG/ML
METHADONE UR CFM-MCNC: <25 NG/ML
MIDAZOLAM UR CFM-MCNC: <25 NG/ML
MORPHINE UR CFM-MCNC: <50 NG/ML
NORDIAZEPAM UR CFM-MCNC: <25 NG/ML
NORFENTANYL UR CFM-MCNC: <2.5 NG/ML
NORHYDROCODONE UR CFM-MCNC: 140 NG/ML
NOROXYCODONE UR CFM-MCNC: <25 NG/ML
NORTAPENTADOL UR CFM-MCNC: <25 NG/ML
NORTRAMADOL UR-MCNC: <50 NG/ML
OXAZEPAM UR CFM-MCNC: <25 NG/ML
OXYCODONE UR CFM-MCNC: <25 NG/ML
OXYMORPHONE UR CFM-MCNC: <25 NG/ML
TAPENTADOL UR CFM-MCNC: <25 NG/ML
TEMAZEPAM UR CFM-MCNC: <25 NG/ML
TRAMADOL UR CFM-MCNC: <50 NG/ML
ZOLPIDEM UR CFM-MCNC: <25 NG/ML
ZOLPIDEM UR-MCNC: <25 NG/ML

## 2024-12-04 DIAGNOSIS — M96.1 POSTLAMINECTOMY SYNDROME OF LUMBAR REGION: Primary | ICD-10-CM

## 2024-12-04 NOTE — TELEPHONE ENCOUNTER
Pt is requesting refill of  Norco 5-325 mg 1 tablet po BID                                                          LV:   10/22/24                 NV:    1/16/25              OARRS reviewed with [facility]:  10/22/24 #60/30 days                          Pended RX to [facility] for transmission to pharmacy.

## 2024-12-05 RX ORDER — HYDROCODONE BITARTRATE AND ACETAMINOPHEN 5; 325 MG/1; MG/1
1 TABLET ORAL 2 TIMES DAILY PRN
Qty: 60 TABLET | Refills: 0 | Status: SHIPPED | OUTPATIENT
Start: 2024-12-05

## 2025-01-06 DIAGNOSIS — M96.1 POSTLAMINECTOMY SYNDROME OF LUMBAR REGION: ICD-10-CM

## 2025-01-06 RX ORDER — HYDROCODONE BITARTRATE AND ACETAMINOPHEN 5; 325 MG/1; MG/1
1 TABLET ORAL 2 TIMES DAILY PRN
Qty: 6 TABLET | Refills: 0 | Status: SHIPPED | OUTPATIENT
Start: 2025-01-06 | End: 2025-01-08 | Stop reason: SDUPTHER

## 2025-01-08 ENCOUNTER — OFFICE VISIT (OUTPATIENT)
Dept: PAIN MEDICINE | Facility: HOSPITAL | Age: 85
End: 2025-01-08
Payer: MEDICARE

## 2025-01-08 VITALS
OXYGEN SATURATION: 93 % | BODY MASS INDEX: 30.18 KG/M2 | RESPIRATION RATE: 16 BRPM | SYSTOLIC BLOOD PRESSURE: 146 MMHG | DIASTOLIC BLOOD PRESSURE: 77 MMHG | WEIGHT: 164 LBS | HEART RATE: 68 BPM | HEIGHT: 62 IN

## 2025-01-08 DIAGNOSIS — M96.1 LUMBAR POSTLAMINECTOMY SYNDROME: ICD-10-CM

## 2025-01-08 DIAGNOSIS — M96.1 POSTLAMINECTOMY SYNDROME OF LUMBAR REGION: ICD-10-CM

## 2025-01-08 DIAGNOSIS — M54.16 CHRONIC LUMBAR RADICULOPATHY: ICD-10-CM

## 2025-01-08 PROCEDURE — 99214 OFFICE O/P EST MOD 30 MIN: CPT | Performed by: CLINICAL NURSE SPECIALIST

## 2025-01-08 PROCEDURE — 3077F SYST BP >= 140 MM HG: CPT | Performed by: CLINICAL NURSE SPECIALIST

## 2025-01-08 PROCEDURE — G2211 COMPLEX E/M VISIT ADD ON: HCPCS | Performed by: CLINICAL NURSE SPECIALIST

## 2025-01-08 PROCEDURE — 1036F TOBACCO NON-USER: CPT | Performed by: CLINICAL NURSE SPECIALIST

## 2025-01-08 PROCEDURE — 1159F MED LIST DOCD IN RCRD: CPT | Performed by: CLINICAL NURSE SPECIALIST

## 2025-01-08 PROCEDURE — 1123F ACP DISCUSS/DSCN MKR DOCD: CPT | Performed by: CLINICAL NURSE SPECIALIST

## 2025-01-08 PROCEDURE — 3078F DIAST BP <80 MM HG: CPT | Performed by: CLINICAL NURSE SPECIALIST

## 2025-01-08 PROCEDURE — 1160F RVW MEDS BY RX/DR IN RCRD: CPT | Performed by: CLINICAL NURSE SPECIALIST

## 2025-01-08 RX ORDER — HYDROCODONE BITARTRATE AND ACETAMINOPHEN 5; 325 MG/1; MG/1
1 TABLET ORAL 2 TIMES DAILY PRN
Qty: 60 TABLET | Refills: 0 | Status: SHIPPED | OUTPATIENT
Start: 2025-01-09 | End: 2025-02-08

## 2025-01-08 RX ORDER — DULOXETIN HYDROCHLORIDE 30 MG/1
30 CAPSULE, DELAYED RELEASE ORAL DAILY
Qty: 30 CAPSULE | Refills: 5 | Status: SHIPPED | OUTPATIENT
Start: 2025-01-08 | End: 2025-02-07

## 2025-01-08 ASSESSMENT — PATIENT HEALTH QUESTIONNAIRE - PHQ9
1. LITTLE INTEREST OR PLEASURE IN DOING THINGS: NOT AT ALL
2. FEELING DOWN, DEPRESSED OR HOPELESS: NOT AT ALL
SUM OF ALL RESPONSES TO PHQ9 QUESTIONS 1 AND 2: 0

## 2025-01-08 ASSESSMENT — ENCOUNTER SYMPTOMS
LOSS OF SENSATION IN FEET: 0
DEPRESSION: 0
OCCASIONAL FEELINGS OF UNSTEADINESS: 1

## 2025-01-08 NOTE — ASSESSMENT & PLAN NOTE
84-year-old female comes for follow-up of symptoms consistent with lumbar postlaminectomy syndrome as well as cervical/thoracic myofascial pain. Endorsed minimal relief with caudal epidural steroid injection targeting lumbar radicular symptoms.  Repeated lumbar MRI and reviewed with patient.  Also repeated thoracic x-ray with no acute changes.  Patient had her spinal cord stimulator reprogrammed which did provide additional relief of lumbar radicular pain.  Presenting at today's office visit with increasing mid back/thoracic pain and cervical symptoms.  She did have a near fall where she twisted and felt that this exacerbated her pain to her mid back.  She also continues to experience low back pain with radiation most often to her left lower extremity.  She does feel that the stimulator is providing some relief, however, she feels it has lost its efficacy and would like to meet with the [place] representative for reprogramming.  Continues to have a significant myofascial component to her pain as noted in muscle tightness and myofascial tenderness throughout her cervical, thoracic and lumbar region.  Offered a formal course of physical therapy to help with myofascial component of pain.  Patient declined and would like to continue her home therapy exercises and stretches.  Discussed potential injections and the patient does not want to pursue injections at this time.  Continued  benefit with hydrocodone which she takes 2 times per day as needed.  She is unsure if she is taking duloxetine and will check her pill organizer at home to verify that she is taking duloxetine 30 mg daily.  We could increase this dose in the future for additional neuropathic/radicular pain relief if she is able to tolerate 30 mg daily consistently. Advised patient that she may increase her Tylenol to 1000 mg up to 2 times per day as needed for additional pain.  Recommended she keep her total Tylenol dose less than 3000 mg in 24 hours.   Recommended she alternate ice and heat.  Recommended she add Voltaren gel or over-the-counter lidocaine patches, IcyHot or Biofreeze for pain relief.  Plan reviewed with patient at today's office visit.    -Continue hydrocodone 5 mg up to 2 times per day as needed for pain.  We will continue this medication as long as it provides relief for the patient.  If she fails to receive relief we may wean her off this medication.  -Continue duloxetine 30 mg daily.  May consider increasing this dose in the future if patient feels it is beneficial.  -Advised patient that she may supplement with extra strength Tylenol taking 2 tablets up to 2 times per day as needed for pain.  Advised her to keep Tylenol dose less than 3000 mg in 24 hours.  -Encourage patient to continue home therapy exercises and stretches consistently.  -Will ask the [employer] representative to meet the patient at her next visit to irrigate and potentially reprogram her spinal cord stimulator.  -Advised patient to contact our office if mid back/thoracic or cervical symptoms should worsen prior to her next visit.  She may require updated imaging.  -Patient will follow-up approximately 2 months for reevaluation.       MEDICAL DECISION MAKING:    My impressions and treatment recommendations were discussed in detail with the patient, who verbalized understanding and had no further questions prior to discharge. Given the patient's report of reduced pain and improved functional ability without adverse effects,  it is reasonable to continue hydrocodone 5mg up to 2 times per day as needed. The terms of the opioid agreement as well as the potential risks and adverse effects of the patient's medication regimen were discussed in detail. This includes if applicable due to dosage of medication permission to discuss and coordinate care with other treatment providers relevant to the patients condition. The patient verbalized understanding.    Treatment goals were  discussed in detail with the patient.  These goals include reduction of pain levels, improved levels of functioning, avoidance of medication side effect and lowest medication dose possible to achieve  these goals.  The patient was in full agreement with these goals.  Also discussed is the understanding that pain may not be eliminated by medication but that the goal of a better sustaining life through use of medication is appropriate.  Lifestyle modifications including weight management, stretching, diet, exercise and smoking are addressed at each office visit.  The patient provided a urine drug screen for routine monitoring and compliance.  This test may be given as frequently as every month based on the patient's individual opiate risk stratification and prescriber concerns for any aberrancies.  This test is indicated given the use of controlled substances for the patient's medical condition.  Unless otherwise noted the prior (s) urine drug testing results were consistent with prescribed medications.  There is no evidence of illicit drug use or additional medications ingested.     Risks and side effects of chronic opioid therapy including but not limited to tolerance, dependence, constipation, hyperalgesia, cognitive side effects, addiction and possible death due to overuse and or misuse were discussed. I also discussed that such medications when co-administered with other sedative agents including but not limited to alcohol, benzodiazepines, sedative hypnotics and illegal drugs could pose life threatening consequences including death.  I also explained the impact that the administration of such medication has on a patient with obstructive sleep apnea and continued recommendations for use of apnea devices if ordered are prescribed by other physicians.  In order to effectively and safely treat the pain, I also emphasized the importance of compliance with the treatment plan as well as compliance with the terms of the  opioid agreement which was reviewed in detail. I explained the importance of being responsible with the medications and to take these only as prescribed, never in excess and never for reasons other than pain reduction. The patient was counseled on keeping the medications safe and locked away from children and other adults as well as disposal methods and options. The patient understood the risks and instructions.      I also discussed with the patient in detail that based on the clinical response to the opioid medications and improvements of activities of daily living, sleep, and work performance in light of compliance with the treatment plan we can continue this form of therapy for the above chronic  pain.  The goal and rationale used for current treatment with chronic opioid medication is to control the pain and alleviate disability induced by the chronic pain condition noted above after failures of other non-opioid and nonpharmacological modalities to treat the chronic pain and the symptoms associated with have failed.  The patient understood the goals in terms of the above treatment plan and had no further questions prior to leaving the office today.    Given the patient's total MED, general use of daily opiates, or other coadministered medications in various classes the patient was offered a prescription for Narcan.  I instructed the patient that Narcan is for emergency use only and is worse suspected or known opiate overdose.  Call 911 prior to administration of this medication to activate the emergency response team.  It is imperative to fill this medication in order to demonstrate understanding of the gravity of possible side effects including respiratory depression and risk of overdose of this opiate load or medication combination.  As such patients will be required to bring Narcan prescriptions to follow-up appointments as part of the compliance with continued opiate care.    Disclaimer: This note was  transcribed using an audio transcription device.  As such, minor errors may be present with regard to spelling, punctuation, and inadvertent word insertion.  Please disregard such errors.

## 2025-01-08 NOTE — PROGRESS NOTES
Subjective   Patient ID: Janelle Jung is a 84 y.o. female who presents for cervical, thoracic and lumbar pain  HPI 84-year-old female with history of chronic low back pain status post L3-S1 fusion with L3-5 laminectomy presents for follow-up.  Patient has a spinal cord stimulator with [employer] which has provided relief for lumbar radicular symptoms.  Recent interrogation of her spinal cord stimulator with reprogramming provided additional relief.  Previous diagnostic lumbar facet injections and caudal epidural steroid injections provided minimal relief.  Thoracic x-ray consistent with multilevel spondylosis with no acute fracture noted.  Most recent L MRI consistent with stable imaging as compared to MRI in 2022; degenerative postoperative changes without high-grade canal or foraminal stenosis identified.  She has a significant myofascial component to her pain causing increasing cervical symptoms.  She has completed formal physical therapy multiple times in the past.  Physical therapy addressed myofascial pain as well as thoracic pain, posture, gait and balance.  She did not notice significant relief with physical therapy.  Manages her pain with hydrocodone 5 mg 2 times per day as needed.  Restarted her duloxetine 30 mg daily.  Previously failed buprenorphine, gabapentin, Robaxin and nortriptyline.  Presenting at today's office visit accompanied by her daughter.  Currently experiencing an increase in mid back/thoracic pain as well as cervical symptoms.  Mid back pain radiating up toward her cervical spine.  She does note occasional pain from her neck radiating into her left shoulder and left upper extremity.  She denies any upper extremity numbness/tingling or weakness.  Continues to experience balance issues.  Low back pain will radiate up toward her thoracic spine as well as radiating to her left lower extremity.  She has chronic numbness and tingling in her left foot.  Denies any increasing weakness  "or changes in bowel/bladder function.  Patient states that  pain increased after she had a near fall where she tripped in her closet and slid to the ground.  She feels that she twisted and this has increased her low back, mid back and cervical symptoms.  Her pain is aching and throbbing.  Pain accompanied by muscle tightness and spasms.  She is unsure what increases her pain.  States that her pain is constant and she has pain daily with most activity.  Continues to manage her pain with hydrocodone 5 mg which she takes twice per day  She is not sure that she is taking her duloxetine 30 mg daily.  She will check her pill container at home to verify that she is taking duloxetine.  She continues to supplement with Tylenol as needed.    Location of Pain:pt is here for interval follow up and medication count. Patient states she is having low back pain and that moves up the back to the middle back. Neck stiffness.    4 days ago had a \"slide to the ground\"      Pain Score: 8/10     Treatment: Norco 5/325mg BID  Count: ?  Fill Date: ?  Last Dose Taken: 1/7/25 2 doses and BID    Efficacy: 20% relief-\"enough that I can function\"     Narcan: exp -patient did not bring     Other medications: Duloxetine 30 mg-needs refill/Tylenol ES     Injection/Procedures: SCS [employer]- helps with radicular pain but not lower back pain/ Bilateral Lumbar MBB - 0% relief with 2nd injection     PT- 4 sessions and was given the exercises to do at home- has not been doing this lately                  OARRS:  Radha Bullard, APRN-CNP, APRN-CNS on 1/8/2025 12:07 PM  I have personally reviewed the OARRS report for Janelle Jung. I have considered the risks of abuse, dependence, addiction and diversion    Is the patient prescribed a combination of a benzodiazepine and opioid?  No    Last Urine Drug Screen / ordered today: No  Recent Results (from the past 8760 hours)   Confirmation Opiate/Opioid/Benzo Prescription Compliance    Collection " Time: 10/22/24  3:47 PM   Result Value Ref Range    Clonazepam <25 <25 ng/mL    7-Aminoclonazepam <25 <25 ng/mL    Alprazolam <25 <25 ng/mL    Alpha-Hydroxyalprazolam <25 <25 ng/mL    Midazolam <25 <25 ng/mL    Alpha-Hydroxymidazolam <25 <25 ng/mL    Chlordiazepoxide <25 <25 ng/mL    Diazepam <25 <25 ng/mL    Nordiazepam <25 <25 ng/mL    Temazepam <25 <25 ng/mL    Oxazepam <25 <25 ng/mL    Lorazepam <25 <25 ng/mL    Methadone <25 <25 ng/mL    EDDP <25 <25 ng/mL    6-Acetylmorphine <25 <25 ng/mL    Codeine <50 <50 ng/mL    Hydrocodone 39 (H) <25 ng/mL    Hydromorphone <25 <25 ng/mL    Morphine  <50 <50 ng/mL    Norhydrocodone 140 (H) <25 ng/mL    Noroxycodone <25 <25 ng/mL    Oxycodone <25 <25 ng/mL    Oxymorphone <25 <25 ng/mL    Fentanyl <2.5 <2.5 ng/mL    Norfentanyl <2.5 <2.5 ng/mL    Tramadol <50 <50 ng/mL    O-Desmethyltramadol <50 <50 ng/mL    Zolpidem <25 <25 ng/mL    Zolpidem Metabolite (ZCA) <25 <25 ng/mL   Screen Opiate/Opioid/Benzo Prescription Compliance    Collection Time: 10/22/24  3:47 PM   Result Value Ref Range    Creatinine, Urine Random 337.4 (H) 20.0 - 320.0 mg/dL    Amphetamine Screen, Urine Presumptive Negative Presumptive Negative    Barbiturate Screen, Urine Presumptive Negative Presumptive Negative    Cannabinoid Screen, Urine Presumptive Negative Presumptive Negative    Cocaine Metabolite Screen, Urine Presumptive Negative Presumptive Negative    PCP Screen, Urine Presumptive Negative Presumptive Negative   Buprenorphine Confirm,Urine    Collection Time: 10/22/24  3:47 PM   Result Value Ref Range    BUPRENORPHINE GLUC, URINE <5 ng/mL    BUPRENORPHINE ,URINE <2 ng/mL    NALOXONE, URINE <100 ng/mL    NORBUPRENORPHINE GLUC,URINE <5 ng/mL    NORBUPRENORPHINE, URINE <2 ng/mL   Tapentadol Confirmation, Urine    Collection Time: 10/22/24  3:47 PM   Result Value Ref Range    Tapentadol <25 <25 ng/mL    N-Desmethyltapentadol <25 <25 ng/mL     Results are as expected.     Controlled Substance  Agreement:  Date of the Last Agreement: 8/29/24  Reviewed Controlled Substance Agreement including but not limited to the benefits, risks, and alternatives to treatment with a Controlled Substance medication(s).        Review of Systems      ROS:   General: No fevers, chills, weight loss  Skin: Negative for lesions  Eyes: No acute vision changes  Ears: No vertigo  Nose, mouth, throat: No difficulty swallowing or speaking  Respiratory: No cough, shortness of breath, cyanosis  Cardiovascular: Negative for chest pain syncope or palpitation  Gastrointestinal: No constipation, nausea, vomiting  Neurological: Negative for headache, positive for: paresthesia and balance issues  Psychological: Negative for severe or debilitating anxiety, depression. Negative memory loss  Musculoskeletal: Positive for arthralgia, myalgia, pain and spasms  Endocrine: Negative for weight gain, appetite changes, excessive sweating  Allergy/immune: Negative    All 13 systems were reviewed and are within normal levels except as noted or in the history of present illness.  Positive or pertinent negative responses are noted or were in the history of present illness. As noted, the patient denies significant or impairing weakness in the bilateral upper and lower extremities, medication induced constipation, and bowel or bladder incontinence.     Current Outpatient Medications   Medication Instructions    alendronate (FOSAMAX) 70 mg, Every 7 days    amLODIPine (Norvasc) 10 mg tablet 1 tablet, Daily    biotin 1 mg tablet 1 tablet, Daily    Bystolic 10 mg tablet 1 tablet, Daily    cholecalciferol (Vitamin D-3) 50 mcg (2,000 unit) capsule Take by mouth.    DULoxetine (CYMBALTA) 30 mg, oral, Daily, Do not crush or chew.    [START ON 1/9/2025] HYDROcodone-acetaminophen (Norco) 5-325 mg tablet 1 tablet, oral, 2 times daily PRN    naloxone (Narcan) 4 mg/0.1 mL nasal spray Administer into affected nostril(s).    omeprazole (PRILOSEC) 40 mg, Daily     ondansetron (ZOFRAN) 4 mg, Every 8 hours PRN    rosuvastatin (CRESTOR) 40 mg, Daily    spironolactone (Aldactone) 25 mg tablet 1 tablet, Daily    triamcinolone (Kenalog) 0.1 % cream 1 application sparingly to affected area Externally Two times a Week for 30 days        Past Medical History:   Diagnosis Date    Essential (primary) hypertension     Hypertension, well controlled        No past surgical history on file.     No family history on file.     Allergies   Allergen Reactions    Plant Extracts Unknown    Adhesive Tape-Silicones Unknown    Fluoxetine GI Upset, Hives, Nausea Only and Unknown    Gabapentin Dizziness and Unknown    Iodinated Contrast Media Unknown    Iodine Unknown    Latex Unknown    Metabo Ephedra-Free Unknown    Metabo-Style Hives    Metronidazole Unknown    Niacin Unknown    Sulfamethoxazole Unknown    Sulfasalazine Hives    Sulfites Unknown    Vit E-MinImagistxHerb Com#37-Bovi Com Unknown    Sulfamethoxazole-Trimethoprim Rash        MR lumbar spine wo IV contrast 06/22/2024    Narrative  Interpreted By:  Jerrod Brooks,  STUDY:  MR LUMBAR SPINE WO IV CONTRAST;  6/22/2024 2:19 pm    INDICATION:  Signs/Symptoms:worsening lumbar radicular symptoms with urinary  symptoms.    COMPARISON:  09/09/2022.    ACCESSION NUMBER(S):  [id number]    ORDERING CLINICIAN:  TRACY MUSE    TECHNIQUE:  Sagittal T1, T2, STIR, axial T1 and T2 weighted images of the lumbar  spine were acquired.    FINDINGS:  Evaluation is at least minimally limited due to lack of intravenous  contrast.    Moderate levoconvex scoliosis with apex at L2-3. Grade 1  retrolisthesis L2 over L3, stable. Posterior spinal fusion with  right-sided pedicular screws and rods from L3 through S1 again noted.  Postsurgical changes status post laminectomy L4 and L5 with  persistent fluid collection in the laminectomy bed measuring up to  2.1 cm craniocaudal by 0.7 cm anterior-posterior by 1.5 cm transverse  without significant change possibly due to seroma  or  pseudomeningocele with evaluation limited for detection of infectious  inflammatory change due to lack of intravenous contrast.    T12-L1:  There is no significant central canal or neural foraminal  stenosis.    L1-2:  Posterior endplate osteophytes minimally indent the ventral  thecal sac and minimally narrow the neuroforamina.    L2-3:  Disc bulge/pseudobulge and spondylolisthesis with small  posterior endplate osteophytes minimally indents the ventral thecal  sac and minimally narrows the neuroforamina    L3-4:  There is no significant central canal or right neural  foraminal stenosis. Hypertrophic facet changes moderately narrow the  left neuroforamen    L4-5: Hypertrophic facet changes minimally narrow the left  subarticular zone and minimally narrow the left greater than right  neuroforamen    L5-S1:  Tiny central disc osteophyte complex minimally indents the  ventral thecal sac. Neuroforamina are not significantly narrowed.    Impression  Stable examination compared with 09/09/2022 with degenerative and  postoperative changes as above described without high-grade central  canal or foraminal stenosis identified. Small fluid collection in the  laminectomy bed at L4/L5 stable since the prior examination perhaps  due to seroma or pseudomeningocele for instance.    Signed by: Jerrod Brooks 6/24/2024 12:37 PM  Dictation workstation:   [id number]      Objective     Vitals:    01/08/25 1155   BP: 146/77   Pulse: 68   Resp: 16   SpO2: 93%               Physical Exam    GENERAL EXAM  Vital Signs: Vital signs to include heart rate, respiration rate, blood pressure, and temperature were reviewed.  General Appearance:  Awake, alert, healthy appearing, well developed, No acute distress.  Head: Normocephalic without evidence of head injury.  Neck: The appearance of the neck was normal without swelling with a midline trachea.  Eyes: The eyelids and eyebrows exhibited no abnormalities.  Pupils were not pin-point.  Sclera  was without icterus.  Lungs: Respiration rhythm and depth was normal.  Respiratory movements were normal without labored breathing.  Cardiovascular: No peripheral edema was present.   Spine, cervical: Tenderness to paraspinous musculature paracervical region bilaterally.  Myofascial tenderness and muscle tightness upper trapezius muscles bilaterally.  Flexion and extension both limited secondary to stiffness and pain.  Spine, thoracic: Tenderness to paraspinous musculature throughout thoracic region.  Myofascial tenderness and muscle tightness throughout thoracic region.  Minimal tenderness over thoracic spine.  No gross step-offs noted.  Spine, lumbar: The patient is able to rise from seated to standing position without hesitancy, push off or delay.  Gait is slow, deliberate and forward leaning.  Tenderness to paraspinous musculature lower lumbar region bilaterally. Myofascial tenderness and muscle tightness throughout lumbar region.  Minimal tenderness over lumbar spine with no gross step-offs noted.  Flexion intact with extension more limited and increasing patient's pain.  Neurological: Patient was oriented to time, place, and person.  Speech was normal.  Equal muscle strength bilateral lower extremities 5/5.   Psychiatric: Appearance was normal with appropriate dress.  Mood was euthymic and affect was normal.  Skin: Affected regions were without ecchymosis or skin lesions.        Assessment/Plan   Problem List Items Addressed This Visit             ICD-10-CM    Chronic lumbar radiculopathy M54.16    Relevant Medications    DULoxetine (Cymbalta) 30 mg DR capsule    Lumbar postlaminectomy syndrome M96.1     84-year-old female comes for follow-up of symptoms consistent with lumbar postlaminectomy syndrome as well as cervical/thoracic myofascial pain. Endorsed minimal relief with caudal epidural steroid injection targeting lumbar radicular symptoms.  Repeated lumbar MRI and reviewed with patient.  Also repeated  thoracic x-ray with no acute changes.  Patient had her spinal cord stimulator reprogrammed which did provide additional relief of lumbar radicular pain.  Presenting at today's office visit with increasing mid back/thoracic pain and cervical symptoms.  She did have a near fall where she twisted and felt that this exacerbated her pain to her mid back.  She also continues to experience low back pain with radiation most often to her left lower extremity.  She does feel that the stimulator is providing some relief, however, she feels it has lost its efficacy and would like to meet with the [employer] representative for reprogramming.  Continues to have a significant myofascial component to her pain as noted in muscle tightness and myofascial tenderness throughout her cervical, thoracic and lumbar region.  Offered a formal course of physical therapy to help with myofascial component of pain.  Patient declined and would like to continue her home therapy exercises and stretches.  Discussed potential injections and the patient does not want to pursue injections at this time.  Continued  benefit with hydrocodone which she takes 2 times per day as needed.  She is unsure if she is taking duloxetine and will check her pill organizer at home to verify that she is taking duloxetine 30 mg daily.  We could increase this dose in the future for additional neuropathic/radicular pain relief if she is able to tolerate 30 mg daily consistently. Advised patient that she may increase her Tylenol to 1000 mg up to 2 times per day as needed for additional pain.  Recommended she keep her total Tylenol dose less than 3000 mg in 24 hours.  Recommended she alternate ice and heat.  Recommended she add Voltaren gel or over-the-counter lidocaine patches, IcyHot or Biofreeze for pain relief.  Plan reviewed with patient at today's office visit.    -Continue hydrocodone 5 mg up to 2 times per day as needed for pain.  We will continue this medication as long  as it provides relief for the patient.  If she fails to receive relief we may wean her off this medication.  -Continue duloxetine 30 mg daily.  May consider increasing this dose in the future if patient feels it is beneficial.  -Advised patient that she may supplement with extra strength Tylenol taking 2 tablets up to 2 times per day as needed for pain.  Advised her to keep Tylenol dose less than 3000 mg in 24 hours.  -Encourage patient to continue home therapy exercises and stretches consistently.  -Will ask the [employer] representative to meet the patient at her next visit to irrigate and potentially reprogram her spinal cord stimulator.  -Advised patient to contact our office if mid back/thoracic or cervical symptoms should worsen prior to her next visit.  She may require updated imaging.  -Patient will follow-up approximately 2 months for reevaluation.       MEDICAL DECISION MAKING:    My impressions and treatment recommendations were discussed in detail with the patient, who verbalized understanding and had no further questions prior to discharge. Given the patient's report of reduced pain and improved functional ability without adverse effects,  it is reasonable to continue hydrocodone 5mg up to 2 times per day as needed. The terms of the opioid agreement as well as the potential risks and adverse effects of the patient's medication regimen were discussed in detail. This includes if applicable due to dosage of medication permission to discuss and coordinate care with other treatment providers relevant to the patients condition. The patient verbalized understanding.    Treatment goals were discussed in detail with the patient.  These goals include reduction of pain levels, improved levels of functioning, avoidance of medication side effect and lowest medication dose possible to achieve  these goals.  The patient was in full agreement with these goals.  Also discussed is the understanding that pain may  not be eliminated by medication but that the goal of a better sustaining life through use of medication is appropriate.  Lifestyle modifications including weight management, stretching, diet, exercise and smoking are addressed at each office visit.  The patient provided a urine drug screen for routine monitoring and compliance.  This test may be given as frequently as every month based on the patient's individual opiate risk stratification and prescriber concerns for any aberrancies.  This test is indicated given the use of controlled substances for the patient's medical condition.  Unless otherwise noted the prior (s) urine drug testing results were consistent with prescribed medications.  There is no evidence of illicit drug use or additional medications ingested.     Risks and side effects of chronic opioid therapy including but not limited to tolerance, dependence, constipation, hyperalgesia, cognitive side effects, addiction and possible death due to overuse and or misuse were discussed. I also discussed that such medications when co-administered with other sedative agents including but not limited to alcohol, benzodiazepines, sedative hypnotics and illegal drugs could pose life threatening consequences including death.  I also explained the impact that the administration of such medication has on a patient with obstructive sleep apnea and continued recommendations for use of apnea devices if ordered are prescribed by other physicians.  In order to effectively and safely treat the pain, I also emphasized the importance of compliance with the treatment plan as well as compliance with the terms of the opioid agreement which was reviewed in detail. I explained the importance of being responsible with the medications and to take these only as prescribed, never in excess and never for reasons other than pain reduction. The patient was counseled on keeping the medications safe and locked away from children and other  adults as well as disposal methods and options. The patient understood the risks and instructions.      I also discussed with the patient in detail that based on the clinical response to the opioid medications and improvements of activities of daily living, sleep, and work performance in light of compliance with the treatment plan we can continue this form of therapy for the above chronic  pain.  The goal and rationale used for current treatment with chronic opioid medication is to control the pain and alleviate disability induced by the chronic pain condition noted above after failures of other non-opioid and nonpharmacological modalities to treat the chronic pain and the symptoms associated with have failed.  The patient understood the goals in terms of the above treatment plan and had no further questions prior to leaving the office today.    Given the patient's total MED, general use of daily opiates, or other coadministered medications in various classes the patient was offered a prescription for Narcan.  I instructed the patient that Narcan is for emergency use only and is worse suspected or known opiate overdose.  Call 911 prior to administration of this medication to activate the emergency response team.  It is imperative to fill this medication in order to demonstrate understanding of the gravity of possible side effects including respiratory depression and risk of overdose of this opiate load or medication combination.  As such patients will be required to bring Narcan prescriptions to follow-up appointments as part of the compliance with continued opiate care.    Disclaimer: This note was transcribed using an audio transcription device.  As such, minor errors may be present with regard to spelling, punctuation, and inadvertent word insertion.  Please disregard such errors.             Relevant Medications    DULoxetine (Cymbalta) 30 mg DR capsule    Postlaminectomy syndrome of lumbar region M96.1     Relevant Medications    HYDROcodone-acetaminophen (Norco) 5-325 mg tablet (Start on 1/9/2025)              This note was generated with the aid of dictation software, there may be typos despite my attempts at proofreading.

## 2025-01-09 ENCOUNTER — TELEPHONE (OUTPATIENT)
Dept: PAIN MEDICINE | Facility: HOSPITAL | Age: 85
End: 2025-01-09
Payer: MEDICARE

## 2025-01-09 NOTE — TELEPHONE ENCOUNTER
Patient calling to advise patient has been taking DULoxetine (Cymbalta) 30 mg everyday .  Teagan wanted to know this info..

## 2025-01-16 ENCOUNTER — APPOINTMENT (OUTPATIENT)
Dept: PAIN MEDICINE | Facility: HOSPITAL | Age: 85
End: 2025-01-16
Payer: MEDICARE

## 2025-02-14 ENCOUNTER — APPOINTMENT (OUTPATIENT)
Dept: CARDIOLOGY | Facility: HOSPITAL | Age: 85
End: 2025-02-14
Payer: MEDICARE

## 2025-02-17 DIAGNOSIS — M96.1 LUMBAR POSTLAMINECTOMY SYNDROME: Primary | ICD-10-CM

## 2025-02-17 NOTE — TELEPHONE ENCOUNTER
Pt is requesting refill of   Norco 5-325 mg 1 tablet po BID [facility]                                                        LV:   1/8/25                  NV:  3/4/25                OARRS reviewed with [facility]:   1/9/25 #60/30 days                         Pended 15 day RX to [facility] for transmission to pharmacy.

## 2025-02-19 RX ORDER — HYDROCODONE BITARTRATE AND ACETAMINOPHEN 5; 325 MG/1; MG/1
1 TABLET ORAL 2 TIMES DAILY PRN
Qty: 60 TABLET | Refills: 0 | Status: SHIPPED | OUTPATIENT
Start: 2025-02-19 | End: 2025-03-21

## 2025-03-04 ENCOUNTER — OFFICE VISIT (OUTPATIENT)
Dept: PAIN MEDICINE | Facility: HOSPITAL | Age: 85
End: 2025-03-04
Payer: MEDICARE

## 2025-03-04 VITALS — RESPIRATION RATE: 18 BRPM | HEIGHT: 62 IN | HEART RATE: 74 BPM | WEIGHT: 166.5 LBS | BODY MASS INDEX: 30.64 KG/M2

## 2025-03-04 DIAGNOSIS — M54.16 CHRONIC LUMBAR RADICULOPATHY: Primary | ICD-10-CM

## 2025-03-04 DIAGNOSIS — M96.1 POSTLAMINECTOMY SYNDROME OF LUMBAR REGION: ICD-10-CM

## 2025-03-04 DIAGNOSIS — M96.1 LUMBAR POSTLAMINECTOMY SYNDROME: ICD-10-CM

## 2025-03-04 DIAGNOSIS — M79.18 MYOFASCIAL PAIN: ICD-10-CM

## 2025-03-04 PROCEDURE — 1125F AMNT PAIN NOTED PAIN PRSNT: CPT | Performed by: CLINICAL NURSE SPECIALIST

## 2025-03-04 PROCEDURE — 99214 OFFICE O/P EST MOD 30 MIN: CPT | Performed by: CLINICAL NURSE SPECIALIST

## 2025-03-04 PROCEDURE — 1160F RVW MEDS BY RX/DR IN RCRD: CPT | Performed by: CLINICAL NURSE SPECIALIST

## 2025-03-04 PROCEDURE — 1159F MED LIST DOCD IN RCRD: CPT | Performed by: CLINICAL NURSE SPECIALIST

## 2025-03-04 PROCEDURE — G2211 COMPLEX E/M VISIT ADD ON: HCPCS | Performed by: CLINICAL NURSE SPECIALIST

## 2025-03-04 PROCEDURE — 1036F TOBACCO NON-USER: CPT | Performed by: CLINICAL NURSE SPECIALIST

## 2025-03-04 PROCEDURE — 1123F ACP DISCUSS/DSCN MKR DOCD: CPT | Performed by: CLINICAL NURSE SPECIALIST

## 2025-03-04 RX ORDER — HYDROCODONE BITARTRATE AND ACETAMINOPHEN 5; 325 MG/1; MG/1
1 TABLET ORAL 2 TIMES DAILY PRN
Qty: 60 TABLET | Refills: 0 | Status: SHIPPED | OUTPATIENT
Start: 2025-03-21 | End: 2025-04-20

## 2025-03-04 ASSESSMENT — ENCOUNTER SYMPTOMS
DEPRESSION: 1
LOSS OF SENSATION IN FEET: 1
OCCASIONAL FEELINGS OF UNSTEADINESS: 1

## 2025-03-04 ASSESSMENT — PAIN SCALES - GENERAL: PAINLEVEL_OUTOF10: 8

## 2025-03-04 ASSESSMENT — PATIENT HEALTH QUESTIONNAIRE - PHQ9
1. LITTLE INTEREST OR PLEASURE IN DOING THINGS: NEARLY EVERY DAY
SUM OF ALL RESPONSES TO PHQ QUESTIONS 1-9: 6
7. TROUBLE CONCENTRATING ON THINGS, SUCH AS READING THE NEWSPAPER OR WATCHING TELEVISION: SEVERAL DAYS
6. FEELING BAD ABOUT YOURSELF - OR THAT YOU ARE A FAILURE OR HAVE LET YOURSELF OR YOUR FAMILY DOWN: NOT AT ALL
4. FEELING TIRED OR HAVING LITTLE ENERGY: SEVERAL DAYS
9. THOUGHTS THAT YOU WOULD BE BETTER OFF DEAD, OR OF HURTING YOURSELF: NOT AT ALL
2. FEELING DOWN, DEPRESSED OR HOPELESS: SEVERAL DAYS
3. TROUBLE FALLING OR STAYING ASLEEP OR SLEEPING TOO MUCH: NOT AT ALL
10. IF YOU CHECKED OFF ANY PROBLEMS, HOW DIFFICULT HAVE THESE PROBLEMS MADE IT FOR YOU TO DO YOUR WORK, TAKE CARE OF THINGS AT HOME, OR GET ALONG WITH OTHER PEOPLE: SOMEWHAT DIFFICULT
SUM OF ALL RESPONSES TO PHQ9 QUESTIONS 1 AND 2: 4
5. POOR APPETITE OR OVEREATING: NOT AT ALL
8. MOVING OR SPEAKING SO SLOWLY THAT OTHER PEOPLE COULD HAVE NOTICED. OR THE OPPOSITE, BEING SO FIGETY OR RESTLESS THAT YOU HAVE BEEN MOVING AROUND A LOT MORE THAN USUAL: NOT AT ALL

## 2025-03-04 NOTE — PROGRESS NOTES
Follow Up Visit    Location of Pain: low back pain, states has been starting to move upward into her back past month. Has stimulator-feels working. Denies pain in legs, but has knee pain and legs are feeling weak.         Pain Score: 8/10    Treatment:  Norco 5-325 mg TID or BID    Last dose: last guillermina  Medication Count: 31  Fill date: 2/19/25    Efficacy: 50%    Side Effects: none    Narcan: did not bring newer one    Other pain medication/neuromodulator: acetaminophen prn 2-3 week    Therapeutic Goals:    Injections and/or Procedures: JOHNNA 8/2/24    Other: PT in the past-does exercises at home    Genetic Swab:  Urine Screen: 10/22/24  Narcotics Agreement: 8/29/24  ORT: 8/29/24  PDUQ: 10/22/24  OA 5/23/24    Subjective   Patient ID: Janelle Jung is a 84 y.o. female who presents for cervical, thoracic and lumbar pain  HPI    84-year-old female with history of chronic low back pain status post L3-S1 fusion with L3-5 laminectomy presents for follow-up.  Patient states she had original surgery at the [facility] and then required further surgery with a neurosurgeon to remove several screws/hardware.  She also has a spinal cord stimulator with [employer] which has provided relief of radicular symptoms.  She did have her spinal cord stimulator interrogated and reprogrammed with additional relief.  Previously had diagnostic lumbar facet injections and a caudal epidural steroid injection providing minimal relief.  Imaging includes a thoracic x-ray consistent with multilevel spondylosis with no acute fracture.  Most recent lumbar MRI from 5/2024 consistent with stable imaging as compared to previous MRI; degenerative postoperative changes without high-grade canal or foraminal stenosis identified.  Continues to have a significant myofascial component to her pain causing increasing cervical as well as lumbar symptoms.  She has done physical therapy multiple times in the past with limited relief.  Physical  therapy attempted to address posterior as well as gait.  Continues to have chronic elevation of her right hip as well as external rotation of her right foot with ambulation.  Manages her pain with hydrocodone 5 mg 2 times per day as needed.  She confirmed at her last office visit that she is taking duloxetine 30 mg daily.  Previously failed buprenorphine, gabapentin, Robaxin and nortriptyline.  Presenting at today's office visit for routine follow-up.  Continues to endorse increasing low back pain which radiates up toward her mid back.  Also continues to endorse cervical pain which occasionally will radiate into her left shoulder and left upper extremity.  Denies any upper extremity numbness/tingling or weakness.  She does feel that her spinal cord stimulator is providing relief of radicular pain in her lower extremities.  She does not feel that it is providing relief of her low back pain.  She has chronic numbness and tingling in her left foot.  Denies any increasing weakness or changes in bowel/bladder function.  Denies recent falls.  Pain is aching and throbbing.  Pain increases with standing, bending and walking.  She feels that overall her pain is increasing and she is planning to pursue further evaluation by neurosurgery.  Continues to do well with a low-dose of hydrocodone 5 mg which she takes 1-2 times per day as needed.  She was recently evaluated by her PCP and states that her PCP increased her duloxetine to 60 mg daily.  Continues to supplement with Tylenol using sparingly.    OARRS:  No data recorded  I have personally reviewed the OARRS report for Janelle Jung. I have considered the risks of abuse, dependence, addiction and diversion    Is the patient prescribed a combination of a benzodiazepine and opioid?  No    Last Urine Drug Screen / ordered today: No  Recent Results (from the past 8760 hours)   Confirmation Opiate/Opioid/Benzo Prescription Compliance    Collection Time: 10/22/24  3:47 PM    Result Value Ref Range    Clonazepam <25 <25 ng/mL    7-Aminoclonazepam <25 <25 ng/mL    Alprazolam <25 <25 ng/mL    Alpha-Hydroxyalprazolam <25 <25 ng/mL    Midazolam <25 <25 ng/mL    Alpha-Hydroxymidazolam <25 <25 ng/mL    Chlordiazepoxide <25 <25 ng/mL    Diazepam <25 <25 ng/mL    Nordiazepam <25 <25 ng/mL    Temazepam <25 <25 ng/mL    Oxazepam <25 <25 ng/mL    Lorazepam <25 <25 ng/mL    Methadone <25 <25 ng/mL    EDDP <25 <25 ng/mL    6-Acetylmorphine <25 <25 ng/mL    Codeine <50 <50 ng/mL    Hydrocodone 39 (H) <25 ng/mL    Hydromorphone <25 <25 ng/mL    Morphine  <50 <50 ng/mL    Norhydrocodone 140 (H) <25 ng/mL    Noroxycodone <25 <25 ng/mL    Oxycodone <25 <25 ng/mL    Oxymorphone <25 <25 ng/mL    Fentanyl <2.5 <2.5 ng/mL    Norfentanyl <2.5 <2.5 ng/mL    Tramadol <50 <50 ng/mL    O-Desmethyltramadol <50 <50 ng/mL    Zolpidem <25 <25 ng/mL    Zolpidem Metabolite (ZCA) <25 <25 ng/mL   Screen Opiate/Opioid/Benzo Prescription Compliance    Collection Time: 10/22/24  3:47 PM   Result Value Ref Range    Creatinine, Urine Random 337.4 (H) 20.0 - 320.0 mg/dL    Amphetamine Screen, Urine Presumptive Negative Presumptive Negative    Barbiturate Screen, Urine Presumptive Negative Presumptive Negative    Cannabinoid Screen, Urine Presumptive Negative Presumptive Negative    Cocaine Metabolite Screen, Urine Presumptive Negative Presumptive Negative    PCP Screen, Urine Presumptive Negative Presumptive Negative   Buprenorphine Confirm,Urine    Collection Time: 10/22/24  3:47 PM   Result Value Ref Range    BUPRENORPHINE GLUC, URINE <5 ng/mL    BUPRENORPHINE ,URINE <2 ng/mL    NALOXONE, URINE <100 ng/mL    NORBUPRENORPHINE GLUC,URINE <5 ng/mL    NORBUPRENORPHINE, URINE <2 ng/mL   Tapentadol Confirmation, Urine    Collection Time: 10/22/24  3:47 PM   Result Value Ref Range    Tapentadol <25 <25 ng/mL    N-Desmethyltapentadol <25 <25 ng/mL     Results are as expected.     Controlled Substance Agreement:  Date of the Last  Agreement:  8/29/24  Reviewed Controlled Substance Agreement including but not limited to the benefits, risks, and alternatives to treatment with a Controlled Substance medication(s).    Monitoring and compliance:    ORT: 8/29/24    PDUQ: 10/22/24    Office Agreement: 5/23/24      Review of Systems    ROS:   General: No fevers, chills, weight loss  Skin: Negative for lesions  Eyes: No acute vision changes  Ears: No vertigo  Nose, mouth, throat: No difficulty swallowing or speaking  Respiratory: No cough, shortness of breath, cyanosis  Cardiovascular: Negative for chest pain syncope or palpitation  Gastrointestinal: No constipation, nausea, vomiting  Neurological: Negative for headache, positive for: Paresthesia  Psychological: Negative for severe or debilitating anxiety, depression. Negative memory loss  Musculoskeletal: Positive for arthralgia, myalgia and pain  Endocrine: Negative for weight gain, appetite changes, excessive sweating  Allergy/immune: Negative    All 13 systems were reviewed and are within normal levels except as noted or in the history of present illness.  Positive or pertinent negative responses are noted or were in the history of present illness. As noted, the patient denies significant or impairing weakness in the bilateral upper and lower extremities, medication induced constipation, and bowel or bladder incontinence.     Current Outpatient Medications:     alendronate (Fosamax) 70 mg tablet, Take 1 tablet (70 mg) by mouth every 7 days. Take in the morning with a full glass of water, on an empty stomach, and do not take anything else by mouth or lie down for the next 30 min., Disp: , Rfl:     amLODIPine (Norvasc) 10 mg tablet, Take 1 tablet (10 mg) by mouth once daily., Disp: , Rfl:     biotin 1 mg tablet, Take 1 tablet (1 mg) by mouth once daily., Disp: , Rfl:     Bystolic 10 mg tablet, Take 1 tablet (10 mg) by mouth once daily., Disp: , Rfl:     cholecalciferol (Vitamin D-3) 50 mcg (2,000  unit) capsule, Take by mouth., Disp: , Rfl:     DULoxetine (Cymbalta) 30 mg DR capsule, Take 1 capsule (30 mg) by mouth once daily. Do not crush or chew., Disp: 30 capsule, Rfl: 5    HYDROcodone-acetaminophen (Norco) 5-325 mg tablet, Take 1 tablet by mouth 2 times a day as needed for severe pain (7 - 10)., Disp: 60 tablet, Rfl: 0    naloxone (Narcan) 4 mg/0.1 mL nasal spray, Administer into affected nostril(s)., Disp: , Rfl:     omeprazole (PriLOSEC) 40 mg DR capsule, Take 1 capsule (40 mg) by mouth once daily., Disp: , Rfl:     ondansetron (Zofran) 4 mg tablet, Take 1 tablet (4 mg) by mouth every 8 hours if needed for nausea or vomiting. (Patient not taking: Reported on 1/8/2025), Disp: , Rfl:     rosuvastatin (Crestor) 40 mg tablet, Take 1 tablet (40 mg) by mouth once daily., Disp: , Rfl:     spironolactone (Aldactone) 25 mg tablet, Take 1 tablet (25 mg) by mouth once daily., Disp: , Rfl:     triamcinolone (Kenalog) 0.1 % cream, 1 application sparingly to affected area Externally Two times a Week for 30 days, Disp: , Rfl:      Past Medical History:   Diagnosis Date    Essential (primary) hypertension     Hypertension, well controlled        No past surgical history on file.     No family history on file.     Allergies   Allergen Reactions    Plant Extracts Unknown    Adhesive Tape-Silicones Unknown    Fluoxetine GI Upset, Hives, Nausea Only and Unknown    Gabapentin Dizziness and Unknown    Iodinated Contrast Media Unknown    Iodine Unknown    Latex Unknown    Metabo Ephedra-Free Unknown    Metabo-Style Hives    Metronidazole Unknown    Niacin Unknown    Sulfamethoxazole Unknown    Sulfasalazine Hives    Sulfites Unknown    Vit E-MinTelcareHerb Com#37-Bovi Com Unknown    Sulfamethoxazole-Trimethoprim Rash        Objective     Visit Vitals  Smoking Status Former        Physical Exam    PE:  General: Well-developed, well-nourished, no acute distress. The patient demonstrates no pain behavior, symptom magnification or  overt drug-seeking behavior.  Eye: Pupils appropriate for room lighting  Neck/thyroid: No obvious goiter or enlargement of neck noted  Respiratory exam: Normal respiratory effort, unlabored respiration. No accessory muscle use noted  Cardiac exam: Bilateral radial pulses intact  Abdominal: Nondistended  Spine, cervical: Flexion and extension limited.  Tenderness to paraspinous musculature throughout paracervical region.  Myofascial tenderness and muscle tightness upper trapezius muscles  Spine, lumbar: The patient is able to rise from a seated to standing position with hesitancy.  Gait is slow, deliberate and forward leaning.  Chronic external rotation of her right foot with ambulation.  Chronic elevation of her right hip.  Tenderness to paraspinous musculature lower lumbar region.  Myofascial tenderness and muscle tightness from her lower thoracic region through lumbar region.  Minimal tenderness over thoracic or lumbar spine.  Flexion intact with extension limited and increasing pain.    Neurologic exam: Muscle strength is antigravity in all 4 extremities.  Equal strength bilateral lower extremities 5/5.  Psychiatric exam: Judgment and insight normal, affect normal, speech is fluent, affect appropriate, demonstrating no signs of hypersomnolence, sedation, or confusion        Assessment/Plan   Problem List Items Addressed This Visit             ICD-10-CM    Chronic lumbar radiculopathy - Primary M54.16    Lumbar postlaminectomy syndrome M96.1     84-year-old female presents for follow-up of symptoms consistent with lumbar postlaminectomy syndrome as well as cervical/thoracic myofascial pain. Endorsed minimal relief with caudal epidural steroid injection targeting lumbar radicular symptoms.  Repeated lumbar MRI and reviewed with patient.  Also repeated thoracic x-ray with no acute changes.  Patient had her spinal cord stimulator reprogrammed which did provide additional relief of lumbar radicular pain.  Presenting  at today's office visit with persistent mid to low back pain as well as cervical symptoms.  Patient states she is getting worse and feels that something needs to be done.  Most bothered by low back pain with radiation most often to her left lower extremity.  She does feel that the stimulator is providing better relief of her radicular symptoms, however, it is not helping her low back symptoms.  She has not reached out to the [employer] representative.  She also has a significant myofascial component to her pain with muscle tightness noted throughout her cervical, thoracic and lumbar region.  Gait remains antalgic with chronic elevation of her right hip.  Again discussed revisiting physical therapy to help with myofascial component of pain as well as gait/balance.  Patient declined and would like to continue her home therapy exercises and stretches.  Discussed potential injections and the patient does not want to pursue injections at this time.  She would like to revisit her neurosurgeon for further evaluation of her low back symptoms.  Continued  benefit with hydrocodone which she takes 2 times per day as needed.  Patient states that she was recently evaluated by her PCP and had her duloxetine increased to 60 mg/day at her most recent visit.  States that she is tolerating this dose without adverse effects.  Continues to supplement with Tylenol. Recommended she keep her total Tylenol dose less than 3000 mg in 24 hours.  Recommended she alternate ice and heat.  Recommended she add Voltaren gel or over-the-counter lidocaine patches, IcyHot or Biofreeze for pain relief.  Plan reviewed with patient at today's office visit.    -Continue hydrocodone 5 mg up to 2 times per day as needed for pain.  We will continue this medication as long as it provides relief for the patient.  If she fails to receive relief we may wean her off this medication.  -Continue duloxetine 60 mg daily; recently increased by her PCP.     -Advised patient that she may supplement with extra strength Tylenol taking 2 tablets up to 2 times per day as needed for pain.  Advised her to keep Tylenol dose less than 3000 mg in 24 hours.  -Encourage patient to continue home therapy exercises and stretches consistently.  -Advised patient to reach out to the [employer] representative to interrogate her spinal cord stimulator and potentially reprogram.   -Patient will reach out to her neurosurgeon for reevaluation of lumbar symptoms/lumbar postlaminectomy syndrome.  -Patient will follow-up approximately 3 months for reevaluation.       MEDICAL DECISION MAKING:    My impressions and treatment recommendations were discussed in detail with the patient, who verbalized understanding and had no further questions prior to discharge. Given the patient's report of reduced pain and improved functional ability without adverse effects,  it is reasonable to continue hydrocodone 5mg up to 2 times per day as needed. The terms of the opioid agreement as well as the potential risks and adverse effects of the patient's medication regimen were discussed in detail. This includes if applicable due to dosage of medication permission to discuss and coordinate care with other treatment providers relevant to the patients condition. The patient verbalized understanding.    Treatment goals were discussed in detail with the patient.  These goals include reduction of pain levels, improved levels of functioning, avoidance of medication side effect and lowest medication dose possible to achieve  these goals.  The patient was in full agreement with these goals.  Also discussed is the understanding that pain may not be eliminated by medication but that the goal of a better sustaining life through use of medication is appropriate.  Lifestyle modifications including weight management, stretching, diet, exercise and smoking are addressed at each office visit.  The patient provided a urine  drug screen for routine monitoring and compliance.  This test may be given as frequently as every month based on the patient's individual opiate risk stratification and prescriber concerns for any aberrancies.  This test is indicated given the use of controlled substances for the patient's medical condition.  Unless otherwise noted the prior (s) urine drug testing results were consistent with prescribed medications.  There is no evidence of illicit drug use or additional medications ingested.     Risks and side effects of chronic opioid therapy including but not limited to tolerance, dependence, constipation, hyperalgesia, cognitive side effects, addiction and possible death due to overuse and or misuse were discussed. I also discussed that such medications when co-administered with other sedative agents including but not limited to alcohol, benzodiazepines, sedative hypnotics and illegal drugs could pose life threatening consequences including death.  I also explained the impact that the administration of such medication has on a patient with obstructive sleep apnea and continued recommendations for use of apnea devices if ordered are prescribed by other physicians.  In order to effectively and safely treat the pain, I also emphasized the importance of compliance with the treatment plan as well as compliance with the terms of the opioid agreement which was reviewed in detail. I explained the importance of being responsible with the medications and to take these only as prescribed, never in excess and never for reasons other than pain reduction. The patient was counseled on keeping the medications safe and locked away from children and other adults as well as disposal methods and options. The patient understood the risks and instructions.      I also discussed with the patient in detail that based on the clinical response to the opioid medications and improvements of activities of daily living, sleep, and work  performance in light of compliance with the treatment plan we can continue this form of therapy for the above chronic  pain.  The goal and rationale used for current treatment with chronic opioid medication is to control the pain and alleviate disability induced by the chronic pain condition noted above after failures of other non-opioid and nonpharmacological modalities to treat the chronic pain and the symptoms associated with have failed.  The patient understood the goals in terms of the above treatment plan and had no further questions prior to leaving the office today.    Given the patient's total MED, general use of daily opiates, or other coadministered medications in various classes the patient was offered a prescription for Narcan.  I instructed the patient that Narcan is for emergency use only and is worse suspected or known opiate overdose.  Call 911 prior to administration of this medication to activate the emergency response team.  It is imperative to fill this medication in order to demonstrate understanding of the gravity of possible side effects including respiratory depression and risk of overdose of this opiate load or medication combination.  As such patients will be required to bring Narcan prescriptions to follow-up appointments as part of the compliance with continued opiate care.               Relevant Medications    HYDROcodone-acetaminophen (Norco) 5-325 mg tablet (Start on 3/21/2025)    Postlaminectomy syndrome of lumbar region M96.1    Myofascial pain M79.18

## 2025-03-05 NOTE — ASSESSMENT & PLAN NOTE
84-year-old female presents for follow-up of symptoms consistent with lumbar postlaminectomy syndrome as well as cervical/thoracic myofascial pain. Endorsed minimal relief with caudal epidural steroid injection targeting lumbar radicular symptoms.  Repeated lumbar MRI and reviewed with patient.  Also repeated thoracic x-ray with no acute changes.  Patient had her spinal cord stimulator reprogrammed which did provide additional relief of lumbar radicular pain.  Presenting at today's office visit with persistent mid to low back pain as well as cervical symptoms.  Patient states she is getting worse and feels that something needs to be done.  Most bothered by low back pain with radiation most often to her left lower extremity.  She does feel that the stimulator is providing better relief of her radicular symptoms, however, it is not helping her low back symptoms.  She has not reached out to the [employer] representative.  She also has a significant myofascial component to her pain with muscle tightness noted throughout her cervical, thoracic and lumbar region.  Gait remains antalgic with chronic elevation of her right hip.  Again discussed revisiting physical therapy to help with myofascial component of pain as well as gait/balance.  Patient declined and would like to continue her home therapy exercises and stretches.  Discussed potential injections and the patient does not want to pursue injections at this time.  She would like to revisit her neurosurgeon for further evaluation of her low back symptoms.  Continued  benefit with hydrocodone which she takes 2 times per day as needed.  Patient states that she was recently evaluated by her PCP and had her duloxetine increased to 60 mg/day at her most recent visit.  States that she is tolerating this dose without adverse effects.  Continues to supplement with Tylenol. Recommended she keep her total Tylenol dose less than 3000 mg in 24 hours.  Recommended she  alternate ice and heat.  Recommended she add Voltaren gel or over-the-counter lidocaine patches, IcyHot or Biofreeze for pain relief.  Plan reviewed with patient at today's office visit.    -Continue hydrocodone 5 mg up to 2 times per day as needed for pain.  We will continue this medication as long as it provides relief for the patient.  If she fails to receive relief we may wean her off this medication.  -Continue duloxetine 60 mg daily; recently increased by her PCP.    -Advised patient that she may supplement with extra strength Tylenol taking 2 tablets up to 2 times per day as needed for pain.  Advised her to keep Tylenol dose less than 3000 mg in 24 hours.  -Encourage patient to continue home therapy exercises and stretches consistently.  -Advised patient to reach out to the [employer] representative to interrogate her spinal cord stimulator and potentially reprogram.   -Patient will reach out to her neurosurgeon for reevaluation of lumbar symptoms/lumbar postlaminectomy syndrome.  -Patient will follow-up approximately 3 months for reevaluation.       MEDICAL DECISION MAKING:    My impressions and treatment recommendations were discussed in detail with the patient, who verbalized understanding and had no further questions prior to discharge. Given the patient's report of reduced pain and improved functional ability without adverse effects,  it is reasonable to continue hydrocodone 5mg up to 2 times per day as needed. The terms of the opioid agreement as well as the potential risks and adverse effects of the patient's medication regimen were discussed in detail. This includes if applicable due to dosage of medication permission to discuss and coordinate care with other treatment providers relevant to the patients condition. The patient verbalized understanding.    Treatment goals were discussed in detail with the patient.  These goals include reduction of pain levels, improved levels of functioning,  avoidance of medication side effect and lowest medication dose possible to achieve  these goals.  The patient was in full agreement with these goals.  Also discussed is the understanding that pain may not be eliminated by medication but that the goal of a better sustaining life through use of medication is appropriate.  Lifestyle modifications including weight management, stretching, diet, exercise and smoking are addressed at each office visit.  The patient provided a urine drug screen for routine monitoring and compliance.  This test may be given as frequently as every month based on the patient's individual opiate risk stratification and prescriber concerns for any aberrancies.  This test is indicated given the use of controlled substances for the patient's medical condition.  Unless otherwise noted the prior (s) urine drug testing results were consistent with prescribed medications.  There is no evidence of illicit drug use or additional medications ingested.     Risks and side effects of chronic opioid therapy including but not limited to tolerance, dependence, constipation, hyperalgesia, cognitive side effects, addiction and possible death due to overuse and or misuse were discussed. I also discussed that such medications when co-administered with other sedative agents including but not limited to alcohol, benzodiazepines, sedative hypnotics and illegal drugs could pose life threatening consequences including death.  I also explained the impact that the administration of such medication has on a patient with obstructive sleep apnea and continued recommendations for use of apnea devices if ordered are prescribed by other physicians.  In order to effectively and safely treat the pain, I also emphasized the importance of compliance with the treatment plan as well as compliance with the terms of the opioid agreement which was reviewed in detail. I explained the importance of being responsible with the medications  and to take these only as prescribed, never in excess and never for reasons other than pain reduction. The patient was counseled on keeping the medications safe and locked away from children and other adults as well as disposal methods and options. The patient understood the risks and instructions.      I also discussed with the patient in detail that based on the clinical response to the opioid medications and improvements of activities of daily living, sleep, and work performance in light of compliance with the treatment plan we can continue this form of therapy for the above chronic  pain.  The goal and rationale used for current treatment with chronic opioid medication is to control the pain and alleviate disability induced by the chronic pain condition noted above after failures of other non-opioid and nonpharmacological modalities to treat the chronic pain and the symptoms associated with have failed.  The patient understood the goals in terms of the above treatment plan and had no further questions prior to leaving the office today.    Given the patient's total MED, general use of daily opiates, or other coadministered medications in various classes the patient was offered a prescription for Narcan.  I instructed the patient that Narcan is for emergency use only and is worse suspected or known opiate overdose.  Call 911 prior to administration of this medication to activate the emergency response team.  It is imperative to fill this medication in order to demonstrate understanding of the gravity of possible side effects including respiratory depression and risk of overdose of this opiate load or medication combination.  As such patients will be required to bring Narcan prescriptions to follow-up appointments as part of the compliance with continued opiate care.

## 2025-03-10 ENCOUNTER — APPOINTMENT (OUTPATIENT)
Dept: PAIN MEDICINE | Facility: HOSPITAL | Age: 85
End: 2025-03-10
Payer: MEDICARE

## 2025-04-03 DIAGNOSIS — M96.1 LUMBAR POSTLAMINECTOMY SYNDROME: ICD-10-CM

## 2025-04-03 NOTE — TELEPHONE ENCOUNTER
Pt is requesting refill of  Norco 5-325 mg 1 tablet po BID                                                          LV:   3/4/25                 NV:  5/28/25               [facility]RS reviewed with [facility]:    2/19/25 #60/30 days                        Pended RX to Dr. Rodriguez for transmission to pharmacy

## 2025-04-04 RX ORDER — HYDROCODONE BITARTRATE AND ACETAMINOPHEN 5; 325 MG/1; MG/1
1 TABLET ORAL 2 TIMES DAILY PRN
Qty: 60 TABLET | Refills: 0 | Status: SHIPPED | OUTPATIENT
Start: 2025-04-04 | End: 2025-05-04

## 2025-05-05 ENCOUNTER — OFFICE VISIT (OUTPATIENT)
Dept: PAIN MEDICINE | Facility: HOSPITAL | Age: 85
End: 2025-05-05
Payer: MEDICARE

## 2025-05-05 VITALS
HEART RATE: 68 BPM | RESPIRATION RATE: 16 BRPM | BODY MASS INDEX: 30.55 KG/M2 | HEIGHT: 62 IN | SYSTOLIC BLOOD PRESSURE: 147 MMHG | WEIGHT: 166 LBS | DIASTOLIC BLOOD PRESSURE: 56 MMHG | OXYGEN SATURATION: 93 %

## 2025-05-05 DIAGNOSIS — Z79.891 LONG TERM PRESCRIPTION OPIATE USE: Primary | ICD-10-CM

## 2025-05-05 DIAGNOSIS — M79.18 MYOFASCIAL PAIN: ICD-10-CM

## 2025-05-05 DIAGNOSIS — M96.1 LUMBAR POSTLAMINECTOMY SYNDROME: ICD-10-CM

## 2025-05-05 PROCEDURE — 99214 OFFICE O/P EST MOD 30 MIN: CPT | Performed by: CLINICAL NURSE SPECIALIST

## 2025-05-05 PROCEDURE — 3078F DIAST BP <80 MM HG: CPT | Performed by: CLINICAL NURSE SPECIALIST

## 2025-05-05 PROCEDURE — 1123F ACP DISCUSS/DSCN MKR DOCD: CPT | Performed by: CLINICAL NURSE SPECIALIST

## 2025-05-05 PROCEDURE — 3077F SYST BP >= 140 MM HG: CPT | Performed by: CLINICAL NURSE SPECIALIST

## 2025-05-05 RX ORDER — BUPRENORPHINE 5 UG/H
1 PATCH TRANSDERMAL
Qty: 4 PATCH | Refills: 1 | Status: SHIPPED | OUTPATIENT
Start: 2025-05-11 | End: 2025-06-08

## 2025-05-05 ASSESSMENT — ENCOUNTER SYMPTOMS
DEPRESSION: 0
OCCASIONAL FEELINGS OF UNSTEADINESS: 1
LOSS OF SENSATION IN FEET: 0

## 2025-05-05 NOTE — PROGRESS NOTES
Subjective   Patient ID: Janelle Jung is a 84 y.o. female who presents for widespread pain including cervical, thoracic and lumbar pain.    HPI  84-year-old female with history of chronic low back pain status post L3-S1 fusion with L3-5 laminectomy presents for routine follow-up.  Original surgery at the [facility] and then required further surgery with a neurosurgeon to remove several screws/hardware.  She also has a spinal cord stimulator with [employer] which has provided relief of radicular symptoms.  Previously had diagnostic lumbar facet injections and a caudal epidural steroid injection which provided minimal relief.  Most recent imaging from 2024 has included a thoracic x-ray consistent with multilevel spondylosis with no acute fracture.  Lumbar MRI consistent with stable imaging as compared to previous MRI; degenerative postoperative changes without high-grade canal or foraminal stenosis identified.  Patient continues to experience low back pain with minimal radiation to her lower extremities.  Chronic numbness and tingling in her left foot.  Denies any lower extremity weakness or bowel/bladder changes.  Pain is aching and throbbing.  Pain in her mid to low back is constant.  She does feel that her spinal cord stimulator is providing relief of radicular symptoms.  She did contact the [employer] representative due to concern that her stimulator was not providing enough pain relief.  The [employer] representative recommended she turn the stimulator off and assess for the pain level without the stimulator.  She has a significant myofascial component to her pain.  Muscle tightness throughout her mid thoracic and lower lumbar region. She has a curvature to her spine resulting in chronic elevation of her right hip and worsening pain with prolonged standing and ambulation.  She has done physical therapy multiple times in the past to address her low back symptoms as well as her posture and gait.  Physical therapy has not provided significant relief.  She admits to not doing home therapy exercises secondary to worsening pain.  Currently manages her pain with hydrocodone 5 mg up to 2 times per day as needed.  Patient states she has approximately 30 pills remaining from her prescription dated 4/4/2025 for 60 pills..  She currently endorses limited relief with this medication stating she has approximately 25% relief for 2 to 3 hours.  She states that she is taking her duloxetine 60 mg daily.  She has previously failed multiple medications including gabapentin, Robaxin and nortriptyline.  Patient also had a trial of buprenorphine for short period which notes indicate caused her nausea.  The patient was also taking gabapentin at that time which also caused nausea.  It was difficult to determine which medication was causing her GI distress.  She will supplement with extra strength Tylenol.      Location of Pain: Patient has low back pain. Has stimulator-feels working. Denies pain in legs, but has knee pain and legs are feeling weak.         Pain Score: 7/10    Treatment:  Norco 5-325 mg 2-3 x a day    Last dose: 5/4/25 1 dose   Medication Count: patient forgot pills  Fill date: ?    Efficacy: 25% for for 2-3 hours    Side Effects: none    Narcan: forgot today    Other pain medication/neuromodulator: acetaminophen prn    Injections and/or Procedures: JOHNNA 8/2/24    Other: Does not do exercises at home    OARRS:  Radha Bullard, APRN-CNP, APRN-CNS on 5/5/2025  2:24 PM  I have personally reviewed the OARRS report for Janelle Jung. I have considered the risks of abuse, dependence, addiction and diversion    Is the patient prescribed a combination of a benzodiazepine and opioid?  No    Last Urine Drug Screen / ordered today: Yes  Recent Results (from the past 8760 hours)   Confirmation Opiate/Opioid/Benzo Prescription Compliance    Collection Time: 10/22/24  3:47 PM   Result Value Ref Range    Clonazepam <25 <25 ng/mL     7-Aminoclonazepam <25 <25 ng/mL    Alprazolam <25 <25 ng/mL    Alpha-Hydroxyalprazolam <25 <25 ng/mL    Midazolam <25 <25 ng/mL    Alpha-Hydroxymidazolam <25 <25 ng/mL    Chlordiazepoxide <25 <25 ng/mL    Diazepam <25 <25 ng/mL    Nordiazepam <25 <25 ng/mL    Temazepam <25 <25 ng/mL    Oxazepam <25 <25 ng/mL    Lorazepam <25 <25 ng/mL    Methadone <25 <25 ng/mL    EDDP <25 <25 ng/mL    6-Acetylmorphine <25 <25 ng/mL    Codeine <50 <50 ng/mL    Hydrocodone 39 (H) <25 ng/mL    Hydromorphone <25 <25 ng/mL    Morphine  <50 <50 ng/mL    Norhydrocodone 140 (H) <25 ng/mL    Noroxycodone <25 <25 ng/mL    Oxycodone <25 <25 ng/mL    Oxymorphone <25 <25 ng/mL    Fentanyl <2.5 <2.5 ng/mL    Norfentanyl <2.5 <2.5 ng/mL    Tramadol <50 <50 ng/mL    O-Desmethyltramadol <50 <50 ng/mL    Zolpidem <25 <25 ng/mL    Zolpidem Metabolite (ZCA) <25 <25 ng/mL   Screen Opiate/Opioid/Benzo Prescription Compliance    Collection Time: 10/22/24  3:47 PM   Result Value Ref Range    Creatinine, Urine Random 337.4 (H) 20.0 - 320.0 mg/dL    Amphetamine Screen, Urine Presumptive Negative Presumptive Negative    Barbiturate Screen, Urine Presumptive Negative Presumptive Negative    Cannabinoid Screen, Urine Presumptive Negative Presumptive Negative    Cocaine Metabolite Screen, Urine Presumptive Negative Presumptive Negative    PCP Screen, Urine Presumptive Negative Presumptive Negative   Buprenorphine Confirm,Urine    Collection Time: 10/22/24  3:47 PM   Result Value Ref Range    BUPRENORPHINE GLUC, URINE <5 ng/mL    BUPRENORPHINE ,URINE <2 ng/mL    NALOXONE, URINE <100 ng/mL    NORBUPRENORPHINE GLUC,URINE <5 ng/mL    NORBUPRENORPHINE, URINE <2 ng/mL   Tapentadol Confirmation, Urine    Collection Time: 10/22/24  3:47 PM   Result Value Ref Range    Tapentadol <25 <25 ng/mL    N-Desmethyltapentadol <25 <25 ng/mL     Results are as expected.     Controlled Substance Agreement:  Date of the Last Agreement: 8/29/24  Reviewed Controlled Substance  "Agreement including but not limited to the benefits, risks, and alternatives to treatment with a Controlled Substance medication(s).    Monitoring and compliance:    ORT:    PDUQ:    Office Agreement:      Review of Systems    ROS:   General: No fevers, chills, weight loss  Skin: Negative for lesions  Eyes: No acute vision changes  Ears: No vertigo  Nose, mouth, throat: No difficulty swallowing or speaking  Respiratory: No cough, shortness of breath, cyanosis  Cardiovascular: Negative for chest pain syncope or palpitation  Gastrointestinal: No constipation, nausea, vomiting  Neurological: Negative for headache, positive for: Paresthesia  Psychological: Negative for severe or debilitating anxiety, depression. Negative memory loss  Musculoskeletal: Positive for arthralgia, myalgia, pain and muscle tightness  Endocrine: Negative for weight gain, appetite changes, excessive sweating  Allergy/immune: Negative    All 13 systems were reviewed and are within normal levels except as noted or in the history of present illness.  Positive or pertinent negative responses are noted or were in the history of present illness. As noted, the patient denies significant or impairing weakness in the bilateral upper and lower extremities, medication induced constipation, and bowel or bladder incontinence.   Current Medications[1]     Medical History[2]     Surgical History[3]     Family History[4]     RX Allergies[5]     Objective     Visit Vitals  /56   Pulse 68   Resp 16   Ht 1.575 m (5' 2\")   Wt 75.3 kg (166 lb)   SpO2 93%   BMI 30.36 kg/m²   Smoking Status Former   BSA 1.82 m²        Physical Exam    PE:  General: Well-developed, well-nourished, no acute distress. The patient demonstrates no pain behavior, symptom magnification or overt drug-seeking behavior.  Eye: Pupils appropriate for room lighting  Neck/thyroid: No obvious goiter or enlargement of neck noted  Respiratory exam: Normal respiratory effort, unlabored " respiration. No accessory muscle use noted  Cardiac exam: Bilateral radial pulses intact  Abdominal: Nondistended  Spine, lumbar: The patient is able to rise from a seated to standing position with hesitancy.  Gait is slow, deliberate and forward leaning.  Chronic external rotation of her right foot and elevation of her right hip.  Tenderness to paraspinous musculature mid thoracic through lower lumbar region.  Multiple myofascial tender points and muscle tightness most noted in her mid thoracic region through her lower lumbar region.  Minimal tenderness over thoracic or lumbar spine.  No gross step-offs noted.  Flexion and extension both limited secondary to increasing pain and stiffness.  Without hesitancy, push off, or delay. Gait is grossly nonantalgic. Tenderness to paraspinous musculature is noted  Neurologic exam: Muscle strength is antigravity in all 4 extremities.  Equal strength bilateral upper extremities 5/5.  Psychiatric exam: Judgment and insight normal, affect normal, speech is fluent, affect appropriate, demonstrating no signs of hypersomnolence, sedation, or confusion        Assessment/Plan   Problem List Items Addressed This Visit           ICD-10-CM    Lumbar postlaminectomy syndrome M96.1    84-year-old female presents for follow-up of symptoms consistent with lumbar postlaminectomy syndrome as well as cervical/thoracic myofascial pain. Endorsed minimal relief with diagnostic facet injection targeting lumbar spondylosis or caudal epidural steroid injection targeting lumbar radicular symptoms.  Repeated lumbar MRI and reviewed with patient.  Also repeated thoracic x-ray with no acute changes.  Patient had her spinal cord stimulator reprogrammed initially which provided additional relief of lumbar radicular pain.  Presenting at today's office visit with worsening mid back to low back symptoms accompanied by widespread myofascial pain.  Patient endorsing that she is getting worse and that her pain  is not well-controlled. Most bothered by mid to low back symptoms.  States that she has minimal pain radiating to her lower extremities and feels that her stimulator must be providing relief of radicular symptoms.  She did contact the [employer] representative who suggested she turn the stimulator off to assess the amount of relief she is receiving with the stimulator.  She does not feel that the stimulator helps her low back symptoms.  She also has a significant myofascial component to her pain with muscle tightness noted throughout her thoracic and lumbar region.  Gait remains antalgic with chronic elevation of her right hip and external rotation of her right foot.  Offered additional physical therapy to work on her gait, balance and myofascial component of pain.  She has declined physical therapy and states that it did not help her in the past.  She admits to not doing home therapy exercises secondary to worsening pain.  Discussed with patient that she may benefit from OMT targeting myofascial component of pain.  Unfortunately injections have provided limited relief for her low back symptoms.  As far as medication, the patient does not feel that hydrocodone is providing much relief.  It does not appear that the patient takes this medication consistently as she has approximately 30 pills remaining from a prescription filled on 4/4/2025 for 60 pills.  At this time we discussed a rotation to a different medication to provide better symptom relief.  I do think the patient may benefit from retrying buprenorphine as it is difficult to know if this medication caused her nausea or if gabapentin was causing her nausea as she started both medications at the same time.  I do feel that buprenorphine may provide her long acting and more consistent pain relief.  Reviewed potential side effects with patient.  Advised her that she may continue her hydrocodone 5 mg up to 2 times per day as needed after starting her  buprenorphine patch for approximately 10 days.  After 10 days recommend she stop the hydrocodone.  Recommend she continue duloxetine as prescribed by her PCP and advised the patient that this may provide additional relief of neuropathic/radicular pain.  Recommend she continue to supplement with Tylenol keeping her total Tylenol dose less than 3000 mg in 24 hours. Recommended she alternate ice and heat.  Recommended she add Voltaren gel or over-the-counter lidocaine patches, IcyHot or Biofreeze for pain relief.  Plan reviewed with patient at today's office visit.    -Restart buprenorphine 5 mcg/h transdermal patch weekly.  Patient may continue her hydrocodone 5 mg up to 2 times per day as needed for the first 10 days after starting buprenorphine.  After 10 days instructed patient to discontinue the hydrocodone.  If patient fails to receive relief with buprenorphine we may consider a drug holiday and wean her completely off opiates.  She may also benefit from a trial of low-dose naltrexone.  -Continue duloxetine 60 mg daily; recently increased by her PCP.    -Advised patient that she may supplement with extra strength Tylenol taking 2 tablets up to 2 times per day as needed for pain.  Advised her to keep Tylenol dose less than 3000 mg in 24 hours.  -Encouraged patient to continue home therapy exercises and stretches as able.  Patient may benefit from OMT targeting myofascial component of pain.  - Advised patient to continue to follow with the [employer] representative for further recommendations for her spinal cord stimulator.   -Patient will follow-up in approximately 6 to 8 weeks for reevaluation.       MEDICAL DECISION MAKING:    My impressions and treatment recommendations were discussed in detail with the patient, who verbalized understanding and had no further questions prior to discharge. Given the patient's report of minimal pain relief/efficacy with hydrocodone, it is reasonable to rotate the patient  to buprenorphine 5 mcg/h transdermal patch weekly.  Patient may take her hydrocodone 5 mg up to 2 times per day for the first 10 days after starting her buprenorphine.  After 10 days she was instructed to discontinue hydrocodone.  The terms of the opioid agreement as well as the potential risks and adverse effects of the patient's medication regimen were discussed in detail. This includes if applicable due to dosage of medication permission to discuss and coordinate care with other treatment providers relevant to the patients condition. The patient verbalized understanding.    Treatment goals were discussed in detail with the patient.  These goals include reduction of pain levels, improved levels of functioning, avoidance of medication side effect and lowest medication dose possible to achieve  these goals.  The patient was in full agreement with these goals.  Also discussed is the understanding that pain may not be eliminated by medication but that the goal of a better sustaining life through use of medication is appropriate.  Lifestyle modifications including weight management, stretching, diet, exercise and smoking are addressed at each office visit.  The patient provided a urine drug screen for routine monitoring and compliance.  This test may be given as frequently as every month based on the patient's individual opiate risk stratification and prescriber concerns for any aberrancies.  This test is indicated given the use of controlled substances for the patient's medical condition.  Unless otherwise noted the prior (s) urine drug testing results were consistent with prescribed medications.  There is no evidence of illicit drug use or additional medications ingested.     Risks and side effects of chronic opioid therapy including but not limited to tolerance, dependence, constipation, hyperalgesia, cognitive side effects, addiction and possible death due to overuse and or misuse were discussed. I also discussed  that such medications when co-administered with other sedative agents including but not limited to alcohol, benzodiazepines, sedative hypnotics and illegal drugs could pose life threatening consequences including death.  I also explained the impact that the administration of such medication has on a patient with obstructive sleep apnea and continued recommendations for use of apnea devices if ordered are prescribed by other physicians.  In order to effectively and safely treat the pain, I also emphasized the importance of compliance with the treatment plan as well as compliance with the terms of the opioid agreement which was reviewed in detail. I explained the importance of being responsible with the medications and to take these only as prescribed, never in excess and never for reasons other than pain reduction. The patient was counseled on keeping the medications safe and locked away from children and other adults as well as disposal methods and options. The patient understood the risks and instructions.      I also discussed with the patient in detail that based on the clinical response to the opioid medications and improvements of activities of daily living, sleep, and work performance in light of compliance with the treatment plan we can continue this form of therapy for the above chronic  pain.  The goal and rationale used for current treatment with chronic opioid medication is to control the pain and alleviate disability induced by the chronic pain condition noted above after failures of other non-opioid and nonpharmacological modalities to treat the chronic pain and the symptoms associated with have failed.  The patient understood the goals in terms of the above treatment plan and had no further questions prior to leaving the office today.    Given the patient's total MED, general use of daily opiates, or other coadministered medications in various classes the patient was offered a prescription for Narcan.   I instructed the patient that Narcan is for emergency use only and is worse suspected or known opiate overdose.  Call 911 prior to administration of this medication to activate the emergency response team.  It is imperative to fill this medication in order to demonstrate understanding of the gravity of possible side effects including respiratory depression and risk of overdose of this opiate load or medication combination.  As such patients will be required to bring Narcan prescriptions to follow-up appointments as part of the compliance with continued opiate care.               Myofascial pain M79.18     Other Visit Diagnoses         Codes      Long term prescription opiate use    -  Primary Z79.891    Relevant Orders    Buprenorphine Confirm,Urine    Tapentadol Confirmation, Urine    Opiate/Opioid/Benzo Prescription Compliance                 [1]   Current Outpatient Medications:     alendronate (Fosamax) 70 mg tablet, Take 1 tablet (70 mg) by mouth every 7 days. Take in the morning with a full glass of water, on an empty stomach, and do not take anything else by mouth or lie down for the next 30 min., Disp: , Rfl:     amLODIPine (Norvasc) 10 mg tablet, Take 1 tablet (10 mg) by mouth once daily., Disp: , Rfl:     biotin 1 mg tablet, Take 1 tablet (1 mg) by mouth once daily., Disp: , Rfl:     Bystolic 10 mg tablet, Take 1 tablet (10 mg) by mouth once daily., Disp: , Rfl:     cholecalciferol (Vitamin D-3) 50 mcg (2,000 unit) capsule, Take by mouth., Disp: , Rfl:     omeprazole (PriLOSEC) 40 mg DR capsule, Take 1 capsule (40 mg) by mouth once daily., Disp: , Rfl:     rosuvastatin (Crestor) 40 mg tablet, Take 1 tablet (40 mg) by mouth once daily., Disp: , Rfl:     spironolactone (Aldactone) 25 mg tablet, Take 1 tablet (25 mg) by mouth once daily., Disp: , Rfl:     triamcinolone (Kenalog) 0.1 % cream, 1 application sparingly to affected area Externally Two times a Week for 30 days, Disp: , Rfl:     DULoxetine  (Cymbalta) 30 mg DR capsule, Take 1 capsule (30 mg) by mouth once daily. Do not crush or chew., Disp: 30 capsule, Rfl: 5    naloxone (Narcan) 4 mg/0.1 mL nasal spray, Administer into affected nostril(s). (Patient not taking: Reported on 5/5/2025), Disp: , Rfl:     ondansetron (Zofran) 4 mg tablet, Take 1 tablet (4 mg) by mouth every 8 hours if needed for nausea or vomiting. (Patient not taking: Reported on 5/5/2025), Disp: , Rfl:   [2]   Past Medical History:  Diagnosis Date    Essential (primary) hypertension     Hypertension, well controlled   [3] No past surgical history on file.  [4] No family history on file.  [5]   Allergies  Allergen Reactions    Plant Extracts Unknown    Adhesive Tape-Silicones Unknown    Fluoxetine GI Upset, Hives, Nausea Only and Unknown    Gabapentin Dizziness and Unknown    Iodinated Contrast Media Unknown    Iodine Unknown    Latex Unknown    Metabo Ephedra-Free Unknown    Metabo-Style Hives    Metronidazole Unknown    Niacin Unknown    Sulfamethoxazole Unknown    Sulfasalazine Hives    Sulfites Unknown    Vit E-Min-Herb Com#37-Bovi Com Unknown    Sulfamethoxazole-Trimethoprim Rash

## 2025-05-05 NOTE — PATIENT INSTRUCTIONS
Start the Buprenorphine weekly patch.   You may continue taking Norco 5-325 mg no more than two times daily by mouth as needed for pain for up to ten days and then completely stop Norco.

## 2025-05-05 NOTE — ASSESSMENT & PLAN NOTE
84-year-old female presents for follow-up of symptoms consistent with lumbar postlaminectomy syndrome as well as cervical/thoracic myofascial pain. Endorsed minimal relief with diagnostic facet injection targeting lumbar spondylosis or caudal epidural steroid injection targeting lumbar radicular symptoms.  Repeated lumbar MRI and reviewed with patient.  Also repeated thoracic x-ray with no acute changes.  Patient had her spinal cord stimulator reprogrammed initially which provided additional relief of lumbar radicular pain.  Presenting at today's office visit with worsening mid back to low back symptoms accompanied by widespread myofascial pain.  Patient endorsing that she is getting worse and that her pain is not well-controlled. Most bothered by mid to low back symptoms.  States that she has minimal pain radiating to her lower extremities and feels that her stimulator must be providing relief of radicular symptoms.  She did contact the [employer] representative who suggested she turn the stimulator off to assess the amount of relief she is receiving with the stimulator.  She does not feel that the stimulator helps her low back symptoms.  She also has a significant myofascial component to her pain with muscle tightness noted throughout her thoracic and lumbar region.  Gait remains antalgic with chronic elevation of her right hip and external rotation of her right foot.  Offered additional physical therapy to work on her gait, balance and myofascial component of pain.  She has declined physical therapy and states that it did not help her in the past.  She admits to not doing home therapy exercises secondary to worsening pain.  Discussed with patient that she may benefit from OMT targeting myofascial component of pain.  Unfortunately injections have provided limited relief for her low back symptoms.  As far as medication, the patient does not feel that hydrocodone is providing much relief.  It does not appear  that the patient takes this medication consistently as she has approximately 30 pills remaining from a prescription filled on 4/4/2025 for 60 pills.  At this time we discussed a rotation to a different medication to provide better symptom relief.  I do think the patient may benefit from retrying buprenorphine as it is difficult to know if this medication caused her nausea or if gabapentin was causing her nausea as she started both medications at the same time.  I do feel that buprenorphine may provide her long acting and more consistent pain relief.  Reviewed potential side effects with patient.  Advised her that she may continue her hydrocodone 5 mg up to 2 times per day as needed after starting her buprenorphine patch for approximately 10 days.  After 10 days recommend she stop the hydrocodone.  Recommend she continue duloxetine as prescribed by her PCP and advised the patient that this may provide additional relief of neuropathic/radicular pain.  Recommend she continue to supplement with Tylenol keeping her total Tylenol dose less than 3000 mg in 24 hours. Recommended she alternate ice and heat.  Recommended she add Voltaren gel or over-the-counter lidocaine patches, IcyHot or Biofreeze for pain relief.  Plan reviewed with patient at today's office visit.    -Restart buprenorphine 5 mcg/h transdermal patch weekly.  Patient may continue her hydrocodone 5 mg up to 2 times per day as needed for the first 10 days after starting buprenorphine.  After 10 days instructed patient to discontinue the hydrocodone.  If patient fails to receive relief with buprenorphine we may consider a drug holiday and wean her completely off opiates.  She may also benefit from a trial of low-dose naltrexone.  -Continue duloxetine 60 mg daily; recently increased by her PCP.    -Advised patient that she may supplement with extra strength Tylenol taking 2 tablets up to 2 times per day as needed for pain.  Advised her to keep Tylenol dose less  than 3000 mg in 24 hours.  -Encouraged patient to continue home therapy exercises and stretches as able.  Patient may benefit from OMT targeting myofascial component of pain.  - Advised patient to continue to follow with the [employer] representative for further recommendations for her spinal cord stimulator.   -Patient will follow-up in approximately 6 to 8 weeks for reevaluation.       MEDICAL DECISION MAKING:    My impressions and treatment recommendations were discussed in detail with the patient, who verbalized understanding and had no further questions prior to discharge. Given the patient's report of minimal pain relief/efficacy with hydrocodone, it is reasonable to rotate the patient to buprenorphine 5 mcg/h transdermal patch weekly.  Patient may take her hydrocodone 5 mg up to 2 times per day for the first 10 days after starting her buprenorphine.  After 10 days she was instructed to discontinue hydrocodone.  The terms of the opioid agreement as well as the potential risks and adverse effects of the patient's medication regimen were discussed in detail. This includes if applicable due to dosage of medication permission to discuss and coordinate care with other treatment providers relevant to the patients condition. The patient verbalized understanding.    Treatment goals were discussed in detail with the patient.  These goals include reduction of pain levels, improved levels of functioning, avoidance of medication side effect and lowest medication dose possible to achieve  these goals.  The patient was in full agreement with these goals.  Also discussed is the understanding that pain may not be eliminated by medication but that the goal of a better sustaining life through use of medication is appropriate.  Lifestyle modifications including weight management, stretching, diet, exercise and smoking are addressed at each office visit.  The patient provided a urine drug screen for routine monitoring and  compliance.  This test may be given as frequently as every month based on the patient's individual opiate risk stratification and prescriber concerns for any aberrancies.  This test is indicated given the use of controlled substances for the patient's medical condition.  Unless otherwise noted the prior (s) urine drug testing results were consistent with prescribed medications.  There is no evidence of illicit drug use or additional medications ingested.     Risks and side effects of chronic opioid therapy including but not limited to tolerance, dependence, constipation, hyperalgesia, cognitive side effects, addiction and possible death due to overuse and or misuse were discussed. I also discussed that such medications when co-administered with other sedative agents including but not limited to alcohol, benzodiazepines, sedative hypnotics and illegal drugs could pose life threatening consequences including death.  I also explained the impact that the administration of such medication has on a patient with obstructive sleep apnea and continued recommendations for use of apnea devices if ordered are prescribed by other physicians.  In order to effectively and safely treat the pain, I also emphasized the importance of compliance with the treatment plan as well as compliance with the terms of the opioid agreement which was reviewed in detail. I explained the importance of being responsible with the medications and to take these only as prescribed, never in excess and never for reasons other than pain reduction. The patient was counseled on keeping the medications safe and locked away from children and other adults as well as disposal methods and options. The patient understood the risks and instructions.      I also discussed with the patient in detail that based on the clinical response to the opioid medications and improvements of activities of daily living, sleep, and work performance in light of compliance with the  treatment plan we can continue this form of therapy for the above chronic  pain.  The goal and rationale used for current treatment with chronic opioid medication is to control the pain and alleviate disability induced by the chronic pain condition noted above after failures of other non-opioid and nonpharmacological modalities to treat the chronic pain and the symptoms associated with have failed.  The patient understood the goals in terms of the above treatment plan and had no further questions prior to leaving the office today.    Given the patient's total MED, general use of daily opiates, or other coadministered medications in various classes the patient was offered a prescription for Narcan.  I instructed the patient that Narcan is for emergency use only and is worse suspected or known opiate overdose.  Call 911 prior to administration of this medication to activate the emergency response team.  It is imperative to fill this medication in order to demonstrate understanding of the gravity of possible side effects including respiratory depression and risk of overdose of this opiate load or medication combination.  As such patients will be required to bring Narcan prescriptions to follow-up appointments as part of the compliance with continued opiate care.         (1) body pink, extremities blue

## 2025-05-09 LAB
1OH-MIDAZOLAM UR-MCNC: NEGATIVE NG/ML
7AMINOCLONAZEPAM UR-MCNC: NEGATIVE NG/ML
A-OH ALPRAZ UR-MCNC: NEGATIVE NG/ML
A-OH-TRIAZOLAM UR-MCNC: NEGATIVE NG/ML
AMPHETAMINES UR QL: NEGATIVE NG/ML
BARBITURATES UR QL: NEGATIVE NG/ML
BUPRENORPHINE UR-MCNC: NORMAL NG/ML
BZE UR QL: NEGATIVE NG/ML
CODEINE UR-MCNC: NEGATIVE NG/ML
CREAT UR-MCNC: 192.3 MG/DL
DRUG SCREEN COMMENT UR-IMP: ABNORMAL
DRUG SCREEN COMMENT UR-IMP: NORMAL
EDDP UR-MCNC: NEGATIVE NG/ML
FENTANYL UR-MCNC: ABNORMAL NG/ML
HYDROCODONE UR-MCNC: 573 NG/ML
HYDROMORPHONE UR-MCNC: 230 NG/ML
LORAZEPAM UR-MCNC: NEGATIVE NG/ML
METHADONE UR-MCNC: NEGATIVE NG/ML
MORPHINE UR-MCNC: NEGATIVE NG/ML
NORBUPRENORPHINE UR-MCNC: NORMAL NG/ML
NORDIAZEPAM UR-MCNC: NEGATIVE NG/ML
NORFENTANYL UR-MCNC: ABNORMAL NG/ML
NORHYDROCODONE UR CFM-MCNC: 609 NG/ML
NOROXYCODONE UR CFM-MCNC: NEGATIVE NG/ML
NORTAPENTADOL UR-MCNC: NEGATIVE NG/ML
NORTRAMADOL UR-MCNC: NEGATIVE NG/ML
OH-ETHYLFLURAZ UR-MCNC: NEGATIVE NG/ML
OXAZEPAM UR-MCNC: NEGATIVE NG/ML
OXIDANTS UR QL: NEGATIVE MCG/ML
OXYCODONE UR CFM-MCNC: NEGATIVE NG/ML
OXYMORPHONE UR CFM-MCNC: NEGATIVE NG/ML
PCP UR QL: NEGATIVE NG/ML
PH UR: 5.1 [PH] (ref 4.5–9)
QUEST 6 ACETYLMORPHINE: NEGATIVE NG/ML
QUEST NALOXONE, URINE: NORMAL
QUEST NOTES AND COMMENTS: NORMAL
QUEST ZOLPIDEM: NEGATIVE NG/ML
TAPENTADOL UR-MCNC: NEGATIVE NG/ML
TEMAZEPAM UR-MCNC: NEGATIVE NG/ML
THC UR QL: NEGATIVE NG/ML
TRAMADOL UR-MCNC: NEGATIVE NG/ML
ZOLPIDEM PHENYL-4-CARB UR CFM-MCNC: NEGATIVE NG/ML

## 2025-05-15 LAB
1OH-MIDAZOLAM UR-MCNC: NEGATIVE NG/ML
7AMINOCLONAZEPAM UR-MCNC: NEGATIVE NG/ML
A-OH ALPRAZ UR-MCNC: NEGATIVE NG/ML
A-OH-TRIAZOLAM UR-MCNC: NEGATIVE NG/ML
AMPHETAMINES UR QL: NEGATIVE NG/ML
BARBITURATES UR QL: NEGATIVE NG/ML
BUPRENORPHINE UR-MCNC: NEGATIVE NG/ML
BZE UR QL: NEGATIVE NG/ML
CODEINE UR-MCNC: NEGATIVE NG/ML
CREAT UR-MCNC: 192.3 MG/DL
DRUG SCREEN COMMENT UR-IMP: ABNORMAL
DRUG SCREEN COMMENT UR-IMP: NORMAL
DRUG SCREEN COMMENT UR-IMP: NORMAL
EDDP UR-MCNC: NEGATIVE NG/ML
FENTANYL UR-MCNC: NEGATIVE NG/ML
HYDROCODONE UR-MCNC: 573 NG/ML
HYDROMORPHONE UR-MCNC: 230 NG/ML
LORAZEPAM UR-MCNC: NEGATIVE NG/ML
METHADONE UR-MCNC: NEGATIVE NG/ML
MORPHINE UR-MCNC: NEGATIVE NG/ML
NORBUPRENORPHINE UR-MCNC: NEGATIVE NG/ML
NORDIAZEPAM UR-MCNC: NEGATIVE NG/ML
NORFENTANYL UR-MCNC: NEGATIVE NG/ML
NORHYDROCODONE UR CFM-MCNC: 609 NG/ML
NOROXYCODONE UR CFM-MCNC: NEGATIVE NG/ML
NORTAPENTADOL UR-MCNC: NEGATIVE NG/ML
NORTRAMADOL UR-MCNC: NEGATIVE NG/ML
OH-ETHYLFLURAZ UR-MCNC: NEGATIVE NG/ML
OXAZEPAM UR-MCNC: NEGATIVE NG/ML
OXIDANTS UR QL: NEGATIVE MCG/ML
OXYCODONE UR CFM-MCNC: NEGATIVE NG/ML
OXYMORPHONE UR CFM-MCNC: NEGATIVE NG/ML
PCP UR QL: NEGATIVE NG/ML
PH UR: 5.1 [PH] (ref 4.5–9)
QUEST 6 ACETYLMORPHINE: NEGATIVE NG/ML
QUEST NALOXONE, URINE: NEGATIVE NG/ML
QUEST NOTES AND COMMENTS: NORMAL
QUEST ZOLPIDEM: NEGATIVE NG/ML
TAPENTADOL UR-MCNC: NEGATIVE NG/ML
TEMAZEPAM UR-MCNC: NEGATIVE NG/ML
THC UR QL: NEGATIVE NG/ML
TRAMADOL UR-MCNC: NEGATIVE NG/ML
ZOLPIDEM PHENYL-4-CARB UR CFM-MCNC: NEGATIVE NG/ML

## 2025-05-28 ENCOUNTER — APPOINTMENT (OUTPATIENT)
Dept: PAIN MEDICINE | Facility: HOSPITAL | Age: 85
End: 2025-05-28
Payer: MEDICARE

## 2025-06-19 ENCOUNTER — APPOINTMENT (OUTPATIENT)
Dept: PAIN MEDICINE | Facility: HOSPITAL | Age: 85
End: 2025-06-19
Payer: MEDICARE

## 2025-06-23 ENCOUNTER — OFFICE VISIT (OUTPATIENT)
Dept: PAIN MEDICINE | Facility: HOSPITAL | Age: 85
End: 2025-06-23
Payer: MEDICARE

## 2025-06-23 VITALS
OXYGEN SATURATION: 93 % | WEIGHT: 166 LBS | DIASTOLIC BLOOD PRESSURE: 51 MMHG | SYSTOLIC BLOOD PRESSURE: 116 MMHG | BODY MASS INDEX: 30.55 KG/M2 | HEART RATE: 64 BPM | RESPIRATION RATE: 16 BRPM | HEIGHT: 62 IN

## 2025-06-23 DIAGNOSIS — M96.1 LUMBAR POSTLAMINECTOMY SYNDROME: Primary | ICD-10-CM

## 2025-06-23 DIAGNOSIS — M54.16 CHRONIC LUMBAR RADICULOPATHY: ICD-10-CM

## 2025-06-23 PROCEDURE — 99214 OFFICE O/P EST MOD 30 MIN: CPT | Performed by: CLINICAL NURSE SPECIALIST

## 2025-06-23 PROCEDURE — 3074F SYST BP LT 130 MM HG: CPT | Performed by: CLINICAL NURSE SPECIALIST

## 2025-06-23 PROCEDURE — 1160F RVW MEDS BY RX/DR IN RCRD: CPT | Performed by: CLINICAL NURSE SPECIALIST

## 2025-06-23 PROCEDURE — 1159F MED LIST DOCD IN RCRD: CPT | Performed by: CLINICAL NURSE SPECIALIST

## 2025-06-23 PROCEDURE — G2211 COMPLEX E/M VISIT ADD ON: HCPCS | Performed by: CLINICAL NURSE SPECIALIST

## 2025-06-23 PROCEDURE — 1036F TOBACCO NON-USER: CPT | Performed by: CLINICAL NURSE SPECIALIST

## 2025-06-23 PROCEDURE — 3078F DIAST BP <80 MM HG: CPT | Performed by: CLINICAL NURSE SPECIALIST

## 2025-06-23 RX ORDER — BUPRENORPHINE 7.5 UG/H
1 PATCH TRANSDERMAL
Qty: 4 PATCH | Refills: 0 | Status: SHIPPED | OUTPATIENT
Start: 2025-06-29 | End: 2025-07-27

## 2025-06-23 ASSESSMENT — ENCOUNTER SYMPTOMS
DEPRESSION: 0
LOSS OF SENSATION IN FEET: 0
OCCASIONAL FEELINGS OF UNSTEADINESS: 1

## 2025-06-23 NOTE — PROGRESS NOTES
Subjective   Patient ID: Janelle Jung is a 84 y.o. female who presents for cervical, thoracic and lumbar pain.  HPI    84-year-old female with history of chronic low back pain status post L3-S1 fusion with L3-5 laminectomy presents for follow-up.  Original surgery at the [facility] and then required further surgery with a neurosurgeon to remove several screws/hardware.  She also has a spinal cord stimulator with [employer] for low back/radicular symptoms.  Patient feels that the stimulator helps her leg pain, however, does not help her low back symptoms.  Previous diagnostic lumbar facet injections and a caudal epidural steroid injection with minimal relief.  Thoracic x-ray consistent with multilevel spondylosis with no acute fracture.  Lumbar MRI consistent with stable imaging as compared to previous MRI; degenerative postoperative changes without high-grade canal or foraminal stenosis.  She has done physical therapy multiple times in the past to address her low back symptoms as well as posture/gait and strengthening.  Physical therapy has not provided significant relief.  She admits to not consistently doing her home therapy exercises and stretches.  Limited relief with hydrocodone 5 mg which she was taking up to 2 times per day as needed.  At that point we rotated the patient to buprenorphine 5 mcg/h transdermal patch weekly.  Patient had previously tried buprenorphine and was taking at the same time as starting gabapentin.  She had experienced some nausea and was unsure which medication was causing her symptoms.  She discontinued her gabapentin secondary to GI symptoms with improvement.  She has also failed Robaxin and nortriptyline secondary to side effects.  Continued duloxetine and occasional Tylenol.    Presenting at today's office visit with continued low back pain with minimal pain radiating to her lower extremities.  She is experiencing leg weakness/heaviness as well as knee pain.  Chronic  numbness and tingling most noted in her left foot.  She has noted increasing constipation as well as intermittent urinary hesitancy.  Currently is being treated for a UTI.  Describes her pain as aching and throbbing.  States that her pain is constant.  Pain increases with prolonged standing, bending and walking.  She never turned her stimulator off to assess for appropriate pain relief.  She is planning to reach out to the [email or web address] representative to discuss her stimulator.  She does feel that she may be receiving some relief with her stimulator.  Continues to have significant myofascial pain and muscle tightness throughout her mid thoracic and lower lumbar region.  She has chronic elevation of her right hip and difficulty with ambulation/gait.  No recent falls, however, she has issues with balance.  She was recently evaluated by Ortho/spine surgery at the [facility] for her low back symptoms.  Patient states they did x-rays indicating that she may have a screw/surgical hardware loose which may be contacting her nerve.  Ordered a CT scan which she will be doing this week.  She will follow-up with the [facility] after she has proceeded with her CT scan.  As far as medication, she is tolerating buprenorphine 5 mcg/h transdermal patch without adverse effects other than constipation.  She is unsure if this medication is providing additional relief.  Continued benefit with duloxetine 60 mg daily and occasional Tylenol.    Location of Pain: Patient has low back pain. Has stimulator. Denies pain in legs, but has knee pain and legs are feeling weak.    [facility]-CT scan for loose screw in back Wednesday         Pain Score: 5/10    Treatment: Butrans 5 mcg patch    Last dose: 6/16/25  Medication Count: 0 patches left  Fill date: ?-did not bring box    Efficacy: 0% relief     Side Effects: none    Narcan: did not bring today    Other pain medication/neuromodulator: acetaminophen prn    Injections and/or  Procedures: JOHNNA 8/2/24    Other: Does not do exercises at home  OARRS:  No data recorded  I have personally reviewed the OARRS report for Janelle Jung. I have considered the risks of abuse, dependence, addiction and diversion    Is the patient prescribed a combination of a benzodiazepine and opioid?  No    Last Urine Drug Screen / ordered today: No  Recent Results (from the past 8760 hours)   Buprenorphine Confirm,Urine    Collection Time: 05/05/25  2:29 PM   Result Value Ref Range    Buprenorphine NEGATIVE <2 ng/mL    Norbuprenorphine NEGATIVE <2 ng/mL    Naloxone NEGATIVE <2 ng/mL    Buprenorphine Comments     Tapentadol Confirmation, Urine    Collection Time: 05/05/25  2:29 PM   Result Value Ref Range    Tapentadol NEGATIVE <50 ng/mL    Nortapentadol NEGATIVE <50 ng/mL    Tapentadol Comments      Notes and Comments     Opiate/Opioid/Benzo Prescription Compliance    Collection Time: 05/05/25  2:29 PM   Result Value Ref Range    Creatinine 192.3 > or = 20.0 mg/dL    pH 5.1 4.5 - 9.0    Oxidant NEGATIVE <200 mcg/mL    Amphetamines NEGATIVE <500 ng/mL    Barbiturates NEGATIVE <300 ng/mL    Cocaine Metabolite NEGATIVE <150 ng/mL    Marijuana Metabolite NEGATIVE <20 ng/mL    Phencyclidine NEGATIVE <25 ng/mL    Alphahydroxyalprazolam NEGATIVE <25 ng/mL    Alphahydroxymidazolam NEGATIVE <50 ng/mL    Alphahydroxytriazolam NEGATIVE <50 ng/mL    Aminoclonazepam NEGATIVE <25 ng/mL    Hydroxyethylflurazepam NEGATIVE <50 ng/mL    Lorazepam NEGATIVE <50 ng/mL    Nordiazepam NEGATIVE <50 ng/mL    Oxazepam NEGATIVE <50 ng/mL    Temazepam NEGATIVE <50 ng/mL    Benzodiazepines Comments      Fentanyl NEGATIVE <0.5 ng/mL    Norfentanyl NEGATIVE <0.5 ng/mL    Fentanyl Comments      6 Acetylmorphine NEGATIVE <10 ng/mL    Heroin Metab Comments      EDDP NEGATIVE <100 ng/mL    Methadone NEGATIVE <100 ng/mL    Methadone Comments      Codeine NEGATIVE <50 ng/mL    Hydrocodone 573 (H) <50 ng/mL    Hydromorphone 230 (H) <50 ng/mL     Morphine NEGATIVE <50 ng/mL    Norhydrocodone 609 (H) <50 ng/mL    Opiates Comments      Noroxycodone NEGATIVE <50 ng/mL    Oxycodone NEGATIVE <50 ng/mL    Oxymorphone NEGATIVE <50 ng/mL    Oxycodone Comments      Desmethyltramadol NEGATIVE <100 ng/mL    Tramadol NEGATIVE <100 ng/mL    Tramadol Comments      Zolpidem NEGATIVE <5 ng/mL    Zolpidem Metabolite NEGATIVE <5 ng/mL    Zolpidem Comments     Screen Opiate/Opioid/Benzo Prescription Compliance    Collection Time: 10/22/24  3:47 PM   Result Value Ref Range    Creatinine, Urine Random 337.4 (H) 20.0 - 320.0 mg/dL    Amphetamine Screen, Urine Presumptive Negative Presumptive Negative    Barbiturate Screen, Urine Presumptive Negative Presumptive Negative    Cannabinoid Screen, Urine Presumptive Negative Presumptive Negative    Cocaine Metabolite Screen, Urine Presumptive Negative Presumptive Negative    PCP Screen, Urine Presumptive Negative Presumptive Negative     Results are as expected.     Controlled Substance Agreement:  Date of the Last Agreement: 8/29/24  Reviewed Controlled Substance Agreement including but not limited to the benefits, risks, and alternatives to treatment with a Controlled Substance medication(s).    Monitoring and compliance:    ORT:    PDUQ:    Office Agreement:      Review of Systems    ROS:   General: No fevers, chills, weight loss  Skin: Negative for lesions  Eyes: No acute vision changes  Ears: No vertigo  Nose, mouth, throat: No difficulty swallowing or speaking  Respiratory: No cough, shortness of breath, cyanosis  Cardiovascular: Negative for chest pain syncope or palpitation  Gastrointestinal: Positive for intermittent constipation, denies nausea, vomiting  Neurological: Negative for headache, positive for: Paresthesia and weakness  Psychological: Negative for severe or debilitating anxiety, depression. Negative memory loss  Musculoskeletal: Positive for arthralgia, myalgia, pain and muscle tightness  Endocrine: Negative for  weight gain, appetite changes, excessive sweating  Allergy/immune: Negative    All 13 systems were reviewed and are within normal levels except as noted or in the history of present illness.  Positive or pertinent negative responses are noted or were in the history of present illness. As noted, the patient denies significant or impairing weakness in the bilateral upper and lower extremities, medication induced constipation, and bowel or bladder incontinence.   Current Medications[1]     Medical History[2]     Surgical History[3]     Family History[4]     RX Allergies[5]     Objective     Visit Vitals  Smoking Status Former        Physical Exam    PE:  General: Well-developed, well-nourished, no acute distress. The patient demonstrates no pain behavior, symptom magnification or overt drug-seeking behavior.  Eye: Pupils appropriate for room lighting  Neck/thyroid: No obvious goiter or enlargement of neck noted  Respiratory exam: Normal respiratory effort, unlabored respiration. No accessory muscle use noted  Cardiac exam: Bilateral radial pulses intact  Abdominal: Nondistended  Spine, lumbar: The patient is able to rise from a seated to standing position with hesitancy.  Gait is antalgic.  Chronic external rotation of her right foot and elevation of her right hip.  Tenderness to paraspinous musculature mid back/thoracic through lumbar region bilaterally.  Multiple myofascial tender points and muscle tightness noted from her thoracic through her lumbar region.  Minimal tenderness over thoracic and lumbar spine.  No gross step-offs noted.  Flexion and extension both limited secondary to stiffness and pain.    Neurologic exam: Muscle strength is antigravity in all 4 extremities.  Psychiatric exam: Judgment and insight normal, affect normal, speech is fluent, affect appropriate, demonstrating no signs of hypersomnolence, sedation, or confusion      Assessment/Plan   Problem List Items Addressed This Visit            ICD-10-CM    Chronic lumbar radiculopathy M54.16    Relevant Medications    buprenorphine (Butrans) 7.5 mcg/hour (Start on 6/29/2025)    Lumbar postlaminectomy syndrome - Primary M96.1    84-year-old female presents for follow-up of symptoms consistent with lumbar postlaminectomy syndrome as well as cervical/thoracic myofascial pain. Endorsed minimal relief with diagnostic facet injection targeting lumbar spondylosis or caudal epidural steroid injection targeting lumbar radicular symptoms.  Repeated lumbar MRI and reviewed with patient.  Also repeated thoracic x-ray with no acute changes.  Patient had her spinal cord stimulator reprogrammed initially which provided additional relief of lumbar radicular pain, however, presented at her last office visit with minimal relief from her stimulator.  At that time she was told to turn the stimulator off to assess pain relief.  Rotated patient from hydrocodone to buprenorphine secondary to lack of efficacy with hydrocodone.  Continued duloxetine.  Presenting at today's office visit with continued mid to low back pain accompanied by myofascial pain and muscle tightness.  Patient is unsure if buprenorphine is providing pain relief.  Denies any adverse effects with this medication other than intermittent constipation.  Advised patient that buprenorphine is a subtle medication that may take time to provide significant relief.  At this time I think it is reasonable to increase her dose of buprenorphine as she is tolerating the medication without adverse effects.  We will increase to buprenorphine 7.5 mcg/hr transdermal patch weekly for 1 month.  Advised patient to contact our office when she puts her last patch on and we will assess the amount of pain relief the patch is providing.  If she feels that the relief remains inadequate we will increase to buprenorphine 10 mcg/h transdermal patch weekly.  Recommended she continue duloxetine 60 mg daily which may be providing significant  neuropathic/radicular pain relief.  Also advised that she may supplement with Tylenol keeping total Tylenol dose less than 3000 mg in 24 hours.  She may utilize ice alternating with heat.  Advised to not put heat over or near her buprenorphine patch.  If she fails to receive relief with buprenorphine, she may benefit from a drug holiday for at least 1 month.  At that time we may try low-dose naltrexone.  She will proceed with CT scan as ordered by Ortho/spine surgery and return to Ortho/spine surgeon for follow-up after she has completed imaging.  Also recommended that she reach out to the [employer] representative to discuss her spinal cord stimulator.  She has declined further physical therapy and states that it did not help her in the past.  She admits to not doing home therapy exercises secondary to worsening pain.  Discussed with patient that she may benefit from OMT targeting myofascial component of pain. Plan reviewed with patient at today's office visit.    - Increase buprenorphine to 7.5 mcg/h transdermal patch weekly.  Advised patient to contact our office when she puts her fourth patch on and we will discuss her current pain relief.  If she feels that she is not receiving adequate pain relief we will increase her dose to buprenorphine 10 mcg/h transdermal patch weekly.  If patient fails to receive relief with buprenorphine we may consider a drug holiday and wean her completely off opiates.  She may also benefit from a trial of low-dose naltrexone.  -Continue duloxetine 60 mg daily; recently increased by her PCP.    -Advised patient that she may supplement with extra strength Tylenol taking 2 tablets up to 2 times per day as needed for pain.  Advised her to keep Tylenol dose less than 3000 mg in 24 hours.  -Encouraged patient to continue home therapy exercises and stretches as able.  Patient may benefit from OMT targeting myofascial component of pain.  - Advised patient to continue to follow with  the [employer] representative for further recommendations for her spinal cord stimulator.   -Patient will follow-up with Ortho/spine surgeon after she has completed her CT scan for further treatment recommendations.  -Patient will follow-up in approximately 2 months or sooner if needed.         MEDICAL DECISION MAKING:    My impressions and treatment recommendations were discussed in detail with the patient, who verbalized understanding and had no further questions prior to discharge. Given the patient's report of minimal pain relief/efficacy with buprenorphine 5 mcg/h transdermal patch weekly, it is reasonable to increase to buprenorphine 7.5 mcg/h transdermal patch weekly. The terms of the opioid agreement as well as the potential risks and adverse effects of the patient's medication regimen were discussed in detail. This includes if applicable due to dosage of medication permission to discuss and coordinate care with other treatment providers relevant to the patients condition. The patient verbalized understanding.    Treatment goals were discussed in detail with the patient.  These goals include reduction of pain levels, improved levels of functioning, avoidance of medication side effect and lowest medication dose possible to achieve  these goals.  The patient was in full agreement with these goals.  Also discussed is the understanding that pain may not be eliminated by medication but that the goal of a better sustaining life through use of medication is appropriate.  Lifestyle modifications including weight management, stretching, diet, exercise and smoking are addressed at each office visit.  The patient provided a urine drug screen for routine monitoring and compliance.  This test may be given as frequently as every month based on the patient's individual opiate risk stratification and prescriber concerns for any aberrancies.  This test is indicated given the use of controlled substances for the  patient's medical condition.  Unless otherwise noted the prior (s) urine drug testing results were consistent with prescribed medications.  There is no evidence of illicit drug use or additional medications ingested.     Risks and side effects of chronic opioid therapy including but not limited to tolerance, dependence, constipation, hyperalgesia, cognitive side effects, addiction and possible death due to overuse and or misuse were discussed. I also discussed that such medications when co-administered with other sedative agents including but not limited to alcohol, benzodiazepines, sedative hypnotics and illegal drugs could pose life threatening consequences including death.  I also explained the impact that the administration of such medication has on a patient with obstructive sleep apnea and continued recommendations for use of apnea devices if ordered are prescribed by other physicians.  In order to effectively and safely treat the pain, I also emphasized the importance of compliance with the treatment plan as well as compliance with the terms of the opioid agreement which was reviewed in detail. I explained the importance of being responsible with the medications and to take these only as prescribed, never in excess and never for reasons other than pain reduction. The patient was counseled on keeping the medications safe and locked away from children and other adults as well as disposal methods and options. The patient understood the risks and instructions.      I also discussed with the patient in detail that based on the clinical response to the opioid medications and improvements of activities of daily living, sleep, and work performance in light of compliance with the treatment plan we can continue this form of therapy for the above chronic  pain.  The goal and rationale used for current treatment with chronic opioid medication is to control the pain and alleviate disability induced by the chronic pain  condition noted above after failures of other non-opioid and nonpharmacological modalities to treat the chronic pain and the symptoms associated with have failed.  The patient understood the goals in terms of the above treatment plan and had no further questions prior to leaving the office today.    Given the patient's total MED, general use of daily opiates, or other coadministered medications in various classes the patient was offered a prescription for Narcan.  I instructed the patient that Narcan is for emergency use only and is worse suspected or known opiate overdose.  Call 911 prior to administration of this medication to activate the emergency response team.  It is imperative to fill this medication in order to demonstrate understanding of the gravity of possible side effects including respiratory depression and risk of overdose of this opiate load or medication combination.  As such patients will be required to bring Narcan prescriptions to follow-up appointments as part of the compliance with continued opiate care.               Relevant Medications    buprenorphine (Butrans) 7.5 mcg/hour (Start on 6/29/2025)            [1]   Current Outpatient Medications:     alendronate (Fosamax) 70 mg tablet, Take 1 tablet (70 mg) by mouth every 7 days. Take in the morning with a full glass of water, on an empty stomach, and do not take anything else by mouth or lie down for the next 30 min., Disp: , Rfl:     amLODIPine (Norvasc) 10 mg tablet, Take 1 tablet (10 mg) by mouth once daily., Disp: , Rfl:     biotin 1 mg tablet, Take 1 tablet (1 mg) by mouth once daily., Disp: , Rfl:     Bystolic 10 mg tablet, Take 1 tablet (10 mg) by mouth once daily., Disp: , Rfl:     cholecalciferol (Vitamin D-3) 50 mcg (2,000 unit) capsule, Take by mouth., Disp: , Rfl:     DULoxetine (Cymbalta) 30 mg DR capsule, Take 1 capsule (30 mg) by mouth once daily. Do not crush or chew., Disp: 30 capsule, Rfl: 5    naloxone (Narcan) 4 mg/0.1 mL  nasal spray, Administer into affected nostril(s). (Patient not taking: Reported on 5/5/2025), Disp: , Rfl:     omeprazole (PriLOSEC) 40 mg DR capsule, Take 1 capsule (40 mg) by mouth once daily., Disp: , Rfl:     ondansetron (Zofran) 4 mg tablet, Take 1 tablet (4 mg) by mouth every 8 hours if needed for nausea or vomiting. (Patient not taking: Reported on 5/5/2025), Disp: , Rfl:     rosuvastatin (Crestor) 40 mg tablet, Take 1 tablet (40 mg) by mouth once daily., Disp: , Rfl:     spironolactone (Aldactone) 25 mg tablet, Take 1 tablet (25 mg) by mouth once daily., Disp: , Rfl:     triamcinolone (Kenalog) 0.1 % cream, 1 application sparingly to affected area Externally Two times a Week for 30 days, Disp: , Rfl:   [2]   Past Medical History:  Diagnosis Date    Essential (primary) hypertension     Hypertension, well controlled   [3] No past surgical history on file.  [4] No family history on file.  [5]   Allergies  Allergen Reactions    Plant Extracts Unknown    Adhesive Tape-Silicones Unknown    Fluoxetine GI Upset, Hives, Nausea Only and Unknown    Gabapentin Dizziness and Unknown    Iodinated Contrast Media Unknown    Iodine Unknown    Latex Unknown    Metabo Ephedra-Free Unknown    Metabo-Style Hives    Metronidazole Unknown    Niacin Unknown    Sulfamethoxazole Unknown    Sulfasalazine Hives    Sulfites Unknown    Vit [email or web address] Com#37-Bovi Com Unknown    Sulfamethoxazole-Trimethoprim Rash

## 2025-06-23 NOTE — ASSESSMENT & PLAN NOTE
84-year-old female presents for follow-up of symptoms consistent with lumbar postlaminectomy syndrome as well as cervical/thoracic myofascial pain. Endorsed minimal relief with diagnostic facet injection targeting lumbar spondylosis or caudal epidural steroid injection targeting lumbar radicular symptoms.  Repeated lumbar MRI and reviewed with patient.  Also repeated thoracic x-ray with no acute changes.  Patient had her spinal cord stimulator reprogrammed initially which provided additional relief of lumbar radicular pain, however, presented at her last office visit with minimal relief from her stimulator.  At that time she was told to turn the stimulator off to assess pain relief.  Rotated patient from hydrocodone to buprenorphine secondary to lack of efficacy with hydrocodone.  Continued duloxetine.  Presenting at today's office visit with continued mid to low back pain accompanied by myofascial pain and muscle tightness.  Patient is unsure if buprenorphine is providing pain relief.  Denies any adverse effects with this medication other than intermittent constipation.  Advised patient that buprenorphine is a subtle medication that may take time to provide significant relief.  At this time I think it is reasonable to increase her dose of buprenorphine as she is tolerating the medication without adverse effects.  We will increase to buprenorphine 7.5 mcg/hr transdermal patch weekly for 1 month.  Advised patient to contact our office when she puts her last patch on and we will assess the amount of pain relief the patch is providing.  If she feels that the relief remains inadequate we will increase to buprenorphine 10 mcg/h transdermal patch weekly.  Recommended she continue duloxetine 60 mg daily which may be providing significant neuropathic/radicular pain relief.  Also advised that she may supplement with Tylenol keeping total Tylenol dose less than 3000 mg in 24 hours.  She may utilize ice alternating with heat.   Advised to not put heat over or near her buprenorphine patch.  If she fails to receive relief with buprenorphine, she may benefit from a drug holiday for at least 1 month.  At that time we may try low-dose naltrexone.  She will proceed with CT scan as ordered by Ortho/spine surgery and return to Ortho/spine surgeon for follow-up after she has completed imaging.  Also recommended that she reach out to the [employer] representative to discuss her spinal cord stimulator.  She has declined further physical therapy and states that it did not help her in the past.  She admits to not doing home therapy exercises secondary to worsening pain.  Discussed with patient that she may benefit from OMT targeting myofascial component of pain. Plan reviewed with patient at today's office visit.    - Increase buprenorphine to 7.5 mcg/h transdermal patch weekly.  Advised patient to contact our office when she puts her fourth patch on and we will discuss her current pain relief.  If she feels that she is not receiving adequate pain relief we will increase her dose to buprenorphine 10 mcg/h transdermal patch weekly.  If patient fails to receive relief with buprenorphine we may consider a drug holiday and wean her completely off opiates.  She may also benefit from a trial of low-dose naltrexone.  -Continue duloxetine 60 mg daily; recently increased by her PCP.    -Advised patient that she may supplement with extra strength Tylenol taking 2 tablets up to 2 times per day as needed for pain.  Advised her to keep Tylenol dose less than 3000 mg in 24 hours.  -Encouraged patient to continue home therapy exercises and stretches as able.  Patient may benefit from OMT targeting myofascial component of pain.  - Advised patient to continue to follow with the [employer] representative for further recommendations for her spinal cord stimulator.   -Patient will follow-up with Ortho/spine surgeon after she has completed her CT scan for  further treatment recommendations.  -Patient will follow-up in approximately 2 months or sooner if needed.         MEDICAL DECISION MAKING:    My impressions and treatment recommendations were discussed in detail with the patient, who verbalized understanding and had no further questions prior to discharge. Given the patient's report of minimal pain relief/efficacy with buprenorphine 5 mcg/h transdermal patch weekly, it is reasonable to increase to buprenorphine 7.5 mcg/h transdermal patch weekly. The terms of the opioid agreement as well as the potential risks and adverse effects of the patient's medication regimen were discussed in detail. This includes if applicable due to dosage of medication permission to discuss and coordinate care with other treatment providers relevant to the patients condition. The patient verbalized understanding.    Treatment goals were discussed in detail with the patient.  These goals include reduction of pain levels, improved levels of functioning, avoidance of medication side effect and lowest medication dose possible to achieve  these goals.  The patient was in full agreement with these goals.  Also discussed is the understanding that pain may not be eliminated by medication but that the goal of a better sustaining life through use of medication is appropriate.  Lifestyle modifications including weight management, stretching, diet, exercise and smoking are addressed at each office visit.  The patient provided a urine drug screen for routine monitoring and compliance.  This test may be given as frequently as every month based on the patient's individual opiate risk stratification and prescriber concerns for any aberrancies.  This test is indicated given the use of controlled substances for the patient's medical condition.  Unless otherwise noted the prior (s) urine drug testing results were consistent with prescribed medications.  There is no evidence of illicit drug use or additional  medications ingested.     Risks and side effects of chronic opioid therapy including but not limited to tolerance, dependence, constipation, hyperalgesia, cognitive side effects, addiction and possible death due to overuse and or misuse were discussed. I also discussed that such medications when co-administered with other sedative agents including but not limited to alcohol, benzodiazepines, sedative hypnotics and illegal drugs could pose life threatening consequences including death.  I also explained the impact that the administration of such medication has on a patient with obstructive sleep apnea and continued recommendations for use of apnea devices if ordered are prescribed by other physicians.  In order to effectively and safely treat the pain, I also emphasized the importance of compliance with the treatment plan as well as compliance with the terms of the opioid agreement which was reviewed in detail. I explained the importance of being responsible with the medications and to take these only as prescribed, never in excess and never for reasons other than pain reduction. The patient was counseled on keeping the medications safe and locked away from children and other adults as well as disposal methods and options. The patient understood the risks and instructions.      I also discussed with the patient in detail that based on the clinical response to the opioid medications and improvements of activities of daily living, sleep, and work performance in light of compliance with the treatment plan we can continue this form of therapy for the above chronic  pain.  The goal and rationale used for current treatment with chronic opioid medication is to control the pain and alleviate disability induced by the chronic pain condition noted above after failures of other non-opioid and nonpharmacological modalities to treat the chronic pain and the symptoms associated with have failed.  The patient understood the goals  in terms of the above treatment plan and had no further questions prior to leaving the office today.    Given the patient's total MED, general use of daily opiates, or other coadministered medications in various classes the patient was offered a prescription for Narcan.  I instructed the patient that Narcan is for emergency use only and is worse suspected or known opiate overdose.  Call 911 prior to administration of this medication to activate the emergency response team.  It is imperative to fill this medication in order to demonstrate understanding of the gravity of possible side effects including respiratory depression and risk of overdose of this opiate load or medication combination.  As such patients will be required to bring Narcan prescriptions to follow-up appointments as part of the compliance with continued opiate care.

## 2025-08-21 ENCOUNTER — OFFICE VISIT (OUTPATIENT)
Dept: PAIN MEDICINE | Facility: HOSPITAL | Age: 85
End: 2025-08-21
Payer: MEDICARE

## 2025-08-21 VITALS
SYSTOLIC BLOOD PRESSURE: 164 MMHG | RESPIRATION RATE: 18 BRPM | WEIGHT: 162.8 LBS | BODY MASS INDEX: 29.96 KG/M2 | HEART RATE: 44 BPM | OXYGEN SATURATION: 98 % | HEIGHT: 62 IN | DIASTOLIC BLOOD PRESSURE: 65 MMHG

## 2025-08-21 DIAGNOSIS — M96.1 POSTLAMINECTOMY SYNDROME OF LUMBAR REGION: Primary | ICD-10-CM

## 2025-08-21 PROCEDURE — 99214 OFFICE O/P EST MOD 30 MIN: CPT | Performed by: CLINICAL NURSE SPECIALIST

## 2025-08-21 PROCEDURE — 99212 OFFICE O/P EST SF 10 MIN: CPT | Performed by: CLINICAL NURSE SPECIALIST

## 2025-08-21 PROCEDURE — 1160F RVW MEDS BY RX/DR IN RCRD: CPT | Performed by: CLINICAL NURSE SPECIALIST

## 2025-08-21 PROCEDURE — G2211 COMPLEX E/M VISIT ADD ON: HCPCS | Performed by: CLINICAL NURSE SPECIALIST

## 2025-08-21 PROCEDURE — 1159F MED LIST DOCD IN RCRD: CPT | Performed by: CLINICAL NURSE SPECIALIST

## 2025-08-21 PROCEDURE — 3078F DIAST BP <80 MM HG: CPT | Performed by: CLINICAL NURSE SPECIALIST

## 2025-08-21 PROCEDURE — 3077F SYST BP >= 140 MM HG: CPT | Performed by: CLINICAL NURSE SPECIALIST

## 2025-08-21 PROCEDURE — 1125F AMNT PAIN NOTED PAIN PRSNT: CPT | Performed by: CLINICAL NURSE SPECIALIST

## 2025-08-21 ASSESSMENT — PAIN SCALES - GENERAL: PAINLEVEL_OUTOF10: 6

## 2025-08-21 ASSESSMENT — PAIN - FUNCTIONAL ASSESSMENT: PAIN_FUNCTIONAL_ASSESSMENT: 0-10
